# Patient Record
Sex: FEMALE | Race: WHITE | Employment: OTHER | URBAN - METROPOLITAN AREA
[De-identification: names, ages, dates, MRNs, and addresses within clinical notes are randomized per-mention and may not be internally consistent; named-entity substitution may affect disease eponyms.]

---

## 2017-01-30 PROBLEM — R07.9 CHEST PAIN: Status: ACTIVE | Noted: 2017-01-30

## 2017-01-30 PROBLEM — E11.9 DM2 (DIABETES MELLITUS, TYPE 2) (HCC): Status: ACTIVE | Noted: 2017-01-30

## 2017-01-30 PROBLEM — K21.9 GERD (GASTROESOPHAGEAL REFLUX DISEASE): Status: ACTIVE | Noted: 2017-01-30

## 2017-02-02 ENCOUNTER — TELEPHONE (OUTPATIENT)
Dept: CARDIOLOGY CLINIC | Age: 67
End: 2017-02-02

## 2017-02-10 ENCOUNTER — OFFICE VISIT (OUTPATIENT)
Dept: CARDIOLOGY CLINIC | Age: 67
End: 2017-02-10

## 2017-02-10 VITALS
BODY MASS INDEX: 31.39 KG/M2 | WEIGHT: 200 LBS | DIASTOLIC BLOOD PRESSURE: 70 MMHG | OXYGEN SATURATION: 97 % | HEIGHT: 67 IN | HEART RATE: 68 BPM | SYSTOLIC BLOOD PRESSURE: 120 MMHG

## 2017-02-10 DIAGNOSIS — I25.10 CORONARY ARTERY DISEASE INVOLVING NATIVE CORONARY ARTERY OF NATIVE HEART WITHOUT ANGINA PECTORIS: Primary | ICD-10-CM

## 2017-02-10 DIAGNOSIS — E78.2 MIXED HYPERLIPIDEMIA: ICD-10-CM

## 2017-02-10 DIAGNOSIS — I10 ESSENTIAL HYPERTENSION: ICD-10-CM

## 2017-02-10 PROBLEM — R07.9 CHEST PAIN: Status: RESOLVED | Noted: 2017-01-30 | Resolved: 2017-02-10

## 2017-02-10 PROBLEM — I25.119 CORONARY ARTERY DISEASE INVOLVING NATIVE CORONARY ARTERY OF NATIVE HEART WITH ANGINA PECTORIS (HCC): Status: ACTIVE | Noted: 2017-02-10

## 2017-02-10 PROCEDURE — 99214 OFFICE O/P EST MOD 30 MIN: CPT | Performed by: NURSE PRACTITIONER

## 2017-02-10 RX ORDER — GLIMEPIRIDE 4 MG/1
4 TABLET ORAL
COMMUNITY

## 2017-02-10 RX ORDER — CLOPIDOGREL BISULFATE 75 MG/1
75 TABLET ORAL DAILY
Qty: 30 TABLET | Refills: 11 | Status: SHIPPED | OUTPATIENT
Start: 2017-02-10 | End: 2018-03-03 | Stop reason: SDUPTHER

## 2017-02-27 ENCOUNTER — TELEPHONE (OUTPATIENT)
Dept: CARDIOLOGY CLINIC | Age: 67
End: 2017-02-27

## 2017-04-13 ENCOUNTER — OFFICE VISIT (OUTPATIENT)
Dept: CARDIOLOGY CLINIC | Age: 67
End: 2017-04-13

## 2017-04-13 VITALS
HEART RATE: 70 BPM | WEIGHT: 190 LBS | SYSTOLIC BLOOD PRESSURE: 132 MMHG | BODY MASS INDEX: 29.82 KG/M2 | HEIGHT: 67 IN | OXYGEN SATURATION: 95 % | DIASTOLIC BLOOD PRESSURE: 74 MMHG

## 2017-04-13 DIAGNOSIS — I10 ESSENTIAL HYPERTENSION: ICD-10-CM

## 2017-04-13 DIAGNOSIS — I25.10 CORONARY ARTERY DISEASE INVOLVING NATIVE CORONARY ARTERY OF NATIVE HEART WITHOUT ANGINA PECTORIS: Primary | ICD-10-CM

## 2017-04-13 DIAGNOSIS — E78.2 MIXED HYPERLIPIDEMIA: ICD-10-CM

## 2017-04-13 PROCEDURE — 99214 OFFICE O/P EST MOD 30 MIN: CPT | Performed by: NURSE PRACTITIONER

## 2017-04-21 ENCOUNTER — HOSPITAL ENCOUNTER (OUTPATIENT)
Dept: NON INVASIVE DIAGNOSTICS | Age: 67
Discharge: HOME | End: 2017-04-21
Attending: NURSE PRACTITIONER | Admitting: NURSE PRACTITIONER

## 2017-04-21 ENCOUNTER — TELEPHONE (OUTPATIENT)
Dept: CARDIOLOGY CLINIC | Age: 67
End: 2017-04-21

## 2017-06-05 RX ORDER — ATORVASTATIN CALCIUM 40 MG/1
40 TABLET, FILM COATED ORAL NIGHTLY
Qty: 30 TABLET | Refills: 0 | Status: SHIPPED | OUTPATIENT
Start: 2017-06-05 | End: 2017-06-05 | Stop reason: SDUPTHER

## 2017-06-05 RX ORDER — CARVEDILOL 3.12 MG/1
3.12 TABLET ORAL 2 TIMES DAILY WITH MEALS
Qty: 60 TABLET | Refills: 0 | Status: SHIPPED | OUTPATIENT
Start: 2017-06-05 | End: 2017-06-05 | Stop reason: SDUPTHER

## 2017-06-06 RX ORDER — CARVEDILOL 3.12 MG/1
3.12 TABLET ORAL 2 TIMES DAILY WITH MEALS
Qty: 60 TABLET | Refills: 3 | Status: SHIPPED | OUTPATIENT
Start: 2017-06-06 | End: 2017-07-11 | Stop reason: SDUPTHER

## 2017-06-06 RX ORDER — ATORVASTATIN CALCIUM 40 MG/1
40 TABLET, FILM COATED ORAL NIGHTLY
Qty: 30 TABLET | Refills: 3 | Status: SHIPPED | OUTPATIENT
Start: 2017-06-06 | End: 2017-11-11 | Stop reason: SDUPTHER

## 2017-07-11 ENCOUNTER — OFFICE VISIT (OUTPATIENT)
Dept: CARDIOLOGY CLINIC | Age: 67
End: 2017-07-11

## 2017-07-11 VITALS
HEART RATE: 64 BPM | HEIGHT: 67 IN | OXYGEN SATURATION: 96 % | BODY MASS INDEX: 29.51 KG/M2 | WEIGHT: 188 LBS | DIASTOLIC BLOOD PRESSURE: 80 MMHG | SYSTOLIC BLOOD PRESSURE: 152 MMHG

## 2017-07-11 DIAGNOSIS — E78.2 MIXED HYPERLIPIDEMIA: ICD-10-CM

## 2017-07-11 DIAGNOSIS — I25.10 CORONARY ARTERY DISEASE INVOLVING NATIVE CORONARY ARTERY OF NATIVE HEART WITHOUT ANGINA PECTORIS: Primary | ICD-10-CM

## 2017-07-11 DIAGNOSIS — I10 ESSENTIAL HYPERTENSION: ICD-10-CM

## 2017-07-11 PROCEDURE — 99214 OFFICE O/P EST MOD 30 MIN: CPT | Performed by: NURSE PRACTITIONER

## 2017-07-11 RX ORDER — CARVEDILOL 6.25 MG/1
6.25 TABLET ORAL 2 TIMES DAILY WITH MEALS
Qty: 60 TABLET | Refills: 5 | Status: SHIPPED | OUTPATIENT
Start: 2017-07-11 | End: 2018-02-05 | Stop reason: SDUPTHER

## 2017-09-01 ENCOUNTER — TELEPHONE (OUTPATIENT)
Dept: CARDIOLOGY CLINIC | Age: 67
End: 2017-09-01

## 2017-11-14 RX ORDER — ATORVASTATIN CALCIUM 40 MG/1
TABLET, FILM COATED ORAL
Qty: 30 TABLET | Refills: 2 | Status: SHIPPED | OUTPATIENT
Start: 2017-11-14 | End: 2018-04-09 | Stop reason: SDUPTHER

## 2018-01-31 ENCOUNTER — OFFICE VISIT (OUTPATIENT)
Dept: CARDIOLOGY CLINIC | Age: 68
End: 2018-01-31

## 2018-01-31 VITALS
WEIGHT: 195 LBS | HEART RATE: 70 BPM | DIASTOLIC BLOOD PRESSURE: 74 MMHG | BODY MASS INDEX: 30.54 KG/M2 | SYSTOLIC BLOOD PRESSURE: 122 MMHG | OXYGEN SATURATION: 93 %

## 2018-01-31 DIAGNOSIS — E78.2 MIXED HYPERLIPIDEMIA: ICD-10-CM

## 2018-01-31 DIAGNOSIS — I10 ESSENTIAL HYPERTENSION: Primary | ICD-10-CM

## 2018-01-31 DIAGNOSIS — I25.119 CORONARY ARTERY DISEASE INVOLVING NATIVE CORONARY ARTERY OF NATIVE HEART WITH ANGINA PECTORIS (HCC): ICD-10-CM

## 2018-01-31 PROCEDURE — 99214 OFFICE O/P EST MOD 30 MIN: CPT | Performed by: NURSE PRACTITIONER

## 2018-01-31 NOTE — PROGRESS NOTES
improve) Space out to every 6 hours as symptoms improve. 1 Inhaler 0    losartan (COZAAR) 100 MG tablet Take 1 tablet by mouth daily 30 tablet 3    metformin (GLUCOPHAGE) 500 MG tablet Take 1 tablet by mouth 2 times daily (with meals). 60 tablet 0     No current facility-administered medications for this visit. REVIEW OF SYSTEMS:   CONSTITUTIONAL: No major weight gain or loss, fatigue, weakness, night sweats or fever. There's been no change in energy level, sleep pattern, or activity level. HEENT: No new vision difficulties or ringing in the ears. RESPIRATORY: No new SOB, PND, orthopnea or cough. CARDIOVASCULAR: See HPI  GI: No nausea, vomiting, diarrhea, constipation, abdominal pain or changes in bowel habits. : No urinary frequency, urgency, incontinence hematuria or dysuria. SKIN: No cyanosis or skin lesions. MUSCULOSKELETAL: No new muscle or joint pain. NEUROLOGICAL: No syncope or TIA-like symptoms. PSYCHIATRIC: No anxiety, pain, insomnia or depression    Objective:   PHYSICAL EXAM:        VITALS:  /74   Pulse 70   Wt 195 lb (88.5 kg)   SpO2 93%   Breastfeeding? No   BMI 30.54 kg/m²     CONSTITUTIONAL: Cooperative, no apparent distress, and appears well nourished / developed  NEUROLOGIC:  Awake and orientated to person, place and time. PSYCH: Calm affect. SKIN: Warm and dry. HEENT: Sclera non-icteric, normocephalic, neck supple, no elevation of JVP, normal carotid pulses with no bruits and thyroid normal size. LUNGS:  No increased work of breathing and clear to auscultation, no crackles or wheezing. CARDIOVASCULAR:  Regular rate and rhythm with no murmurs, gallops, rubs, or abnormal heart sounds, normal PMI. The apical impulses not displaced.                              Heart tones are crisp and normal                                                                Cervical veins are not engorged                 JVP less than 8 cm H2O

## 2018-03-07 RX ORDER — CLOPIDOGREL BISULFATE 75 MG/1
TABLET ORAL
Qty: 30 TABLET | Refills: 10 | Status: SHIPPED | OUTPATIENT
Start: 2018-03-07 | End: 2021-03-28

## 2018-05-11 ENCOUNTER — HOSPITAL ENCOUNTER (OUTPATIENT)
Dept: OTHER | Age: 68
Discharge: HOME | End: 2018-05-11
Attending: FAMILY MEDICINE | Admitting: FAMILY MEDICINE

## 2018-05-11 DIAGNOSIS — R52 PAIN: ICD-10-CM

## 2018-12-29 ENCOUNTER — APPOINTMENT (OUTPATIENT)
Dept: GENERAL RADIOLOGY | Age: 68
End: 2018-12-29
Payer: COMMERCIAL

## 2018-12-29 ENCOUNTER — HOSPITAL ENCOUNTER (EMERGENCY)
Age: 68
Discharge: HOME | End: 2018-12-29
Attending: EMERGENCY MEDICINE
Payer: COMMERCIAL

## 2018-12-29 VITALS
OXYGEN SATURATION: 97 % | RESPIRATION RATE: 20 BRPM | HEIGHT: 66 IN | HEART RATE: 73 BPM | DIASTOLIC BLOOD PRESSURE: 59 MMHG | WEIGHT: 184 LBS | SYSTOLIC BLOOD PRESSURE: 130 MMHG | TEMPERATURE: 98.1 F | BODY MASS INDEX: 29.57 KG/M2

## 2018-12-29 DIAGNOSIS — J06.9 VIRAL URI WITH COUGH: Primary | ICD-10-CM

## 2018-12-29 DIAGNOSIS — M94.0 COSTOCHONDRITIS: ICD-10-CM

## 2018-12-29 LAB
EKG ATRIAL RATE: 76 BPM
EKG DIAGNOSIS: NORMAL
EKG P AXIS: 59 DEGREES
EKG P-R INTERVAL: 164 MS
EKG Q-T INTERVAL: 370 MS
EKG QRS DURATION: 80 MS
EKG QTC CALCULATION (BAZETT): 416 MS
EKG R AXIS: 55 DEGREES
EKG T AXIS: 61 DEGREES
EKG VENTRICULAR RATE: 76 BPM

## 2018-12-29 PROCEDURE — 71046 X-RAY EXAM CHEST 2 VIEWS: CPT

## 2018-12-29 PROCEDURE — 99283 EMERGENCY DEPT VISIT LOW MDM: CPT

## 2018-12-29 PROCEDURE — 94640 AIRWAY INHALATION TREATMENT: CPT

## 2018-12-29 PROCEDURE — 6370000000 HC RX 637 (ALT 250 FOR IP): Performed by: EMERGENCY MEDICINE

## 2018-12-29 PROCEDURE — 93010 ELECTROCARDIOGRAM REPORT: CPT | Performed by: INTERNAL MEDICINE

## 2018-12-29 PROCEDURE — 93005 ELECTROCARDIOGRAM TRACING: CPT | Performed by: EMERGENCY MEDICINE

## 2018-12-29 PROCEDURE — 94664 DEMO&/EVAL PT USE INHALER: CPT

## 2018-12-29 PROCEDURE — 94760 N-INVAS EAR/PLS OXIMETRY 1: CPT

## 2018-12-29 RX ORDER — ALBUTEROL SULFATE 90 UG/1
2 AEROSOL, METERED RESPIRATORY (INHALATION) 4 TIMES DAILY PRN
Qty: 3 INHALER | Refills: 1 | Status: SHIPPED | OUTPATIENT
Start: 2018-12-29 | End: 2021-03-28

## 2018-12-29 RX ORDER — IPRATROPIUM BROMIDE AND ALBUTEROL SULFATE 2.5; .5 MG/3ML; MG/3ML
1 SOLUTION RESPIRATORY (INHALATION) ONCE
Status: COMPLETED | OUTPATIENT
Start: 2018-12-29 | End: 2018-12-29

## 2018-12-29 RX ORDER — BENZONATATE 100 MG/1
100 CAPSULE ORAL ONCE
Status: COMPLETED | OUTPATIENT
Start: 2018-12-29 | End: 2018-12-29

## 2018-12-29 RX ORDER — IBUPROFEN 600 MG/1
600 TABLET ORAL ONCE
Status: COMPLETED | OUTPATIENT
Start: 2018-12-29 | End: 2018-12-29

## 2018-12-29 RX ORDER — BENZONATATE 100 MG/1
100 CAPSULE ORAL 3 TIMES DAILY PRN
Qty: 12 CAPSULE | Refills: 0 | Status: SHIPPED | OUTPATIENT
Start: 2018-12-29 | End: 2021-06-09 | Stop reason: ALTCHOICE

## 2018-12-29 RX ORDER — IBUPROFEN 200 MG
600 TABLET ORAL EVERY 8 HOURS PRN
Qty: 60 TABLET | Refills: 0 | Status: SHIPPED | OUTPATIENT
Start: 2018-12-29 | End: 2021-03-28

## 2018-12-29 RX ADMIN — IBUPROFEN 600 MG: 600 TABLET ORAL at 05:00

## 2018-12-29 RX ADMIN — BENZONATATE 100 MG: 100 CAPSULE ORAL at 05:00

## 2018-12-29 RX ADMIN — IPRATROPIUM BROMIDE AND ALBUTEROL SULFATE 1 AMPULE: .5; 3 SOLUTION RESPIRATORY (INHALATION) at 05:12

## 2018-12-29 ASSESSMENT — ENCOUNTER SYMPTOMS
NAUSEA: 0
ABDOMINAL PAIN: 0
VOMITING: 0
BACK PAIN: 0
COUGH: 1
SHORTNESS OF BREATH: 1

## 2018-12-29 ASSESSMENT — PAIN SCALES - GENERAL: PAINLEVEL_OUTOF10: 5

## 2018-12-30 ENCOUNTER — HOSPITAL ENCOUNTER (EMERGENCY)
Age: 68
Discharge: HOME | End: 2018-12-31
Attending: EMERGENCY MEDICINE
Payer: COMMERCIAL

## 2018-12-30 ENCOUNTER — APPOINTMENT (OUTPATIENT)
Dept: CT IMAGING | Age: 68
End: 2018-12-30
Payer: COMMERCIAL

## 2018-12-30 DIAGNOSIS — E27.8 ADRENAL NODULE (HCC): ICD-10-CM

## 2018-12-30 DIAGNOSIS — E87.1 HYPONATREMIA: ICD-10-CM

## 2018-12-30 DIAGNOSIS — R10.84 GENERALIZED ABDOMINAL PAIN: Primary | ICD-10-CM

## 2018-12-30 DIAGNOSIS — B34.9 VIRAL ILLNESS: ICD-10-CM

## 2018-12-30 LAB
A/G RATIO: 1.2 (ref 1.1–2.2)
ALBUMIN SERPL-MCNC: 3.8 G/DL (ref 3.4–5)
ALP BLD-CCNC: 104 U/L (ref 40–129)
ALT SERPL-CCNC: 18 U/L (ref 10–40)
ANION GAP SERPL CALCULATED.3IONS-SCNC: 9 MMOL/L (ref 3–16)
AST SERPL-CCNC: 20 U/L (ref 15–37)
BASOPHILS ABSOLUTE: 0 K/UL (ref 0–0.2)
BASOPHILS RELATIVE PERCENT: 0.3 %
BILIRUB SERPL-MCNC: 0.4 MG/DL (ref 0–1)
BUN BLDV-MCNC: 10 MG/DL (ref 7–20)
CALCIUM SERPL-MCNC: 8.8 MG/DL (ref 8.3–10.6)
CHLORIDE BLD-SCNC: 96 MMOL/L (ref 99–110)
CO2: 22 MMOL/L (ref 21–32)
CREAT SERPL-MCNC: 0.5 MG/DL (ref 0.6–1.2)
EOSINOPHILS ABSOLUTE: 0 K/UL (ref 0–0.6)
EOSINOPHILS RELATIVE PERCENT: 0.1 %
GFR AFRICAN AMERICAN: >60
GFR NON-AFRICAN AMERICAN: >60
GLOBULIN: 3.2 G/DL
GLUCOSE BLD-MCNC: 247 MG/DL (ref 70–99)
HCT VFR BLD CALC: 42.1 % (ref 36–48)
HEMOGLOBIN: 14.2 G/DL (ref 12–16)
LIPASE: 32 U/L (ref 13–60)
LYMPHOCYTES ABSOLUTE: 0.7 K/UL (ref 1–5.1)
LYMPHOCYTES RELATIVE PERCENT: 13.2 %
MAGNESIUM: 1.8 MG/DL (ref 1.8–2.4)
MCH RBC QN AUTO: 31.3 PG (ref 26–34)
MCHC RBC AUTO-ENTMCNC: 33.8 G/DL (ref 31–36)
MCV RBC AUTO: 92.6 FL (ref 80–100)
MONOCYTES ABSOLUTE: 0.5 K/UL (ref 0–1.3)
MONOCYTES RELATIVE PERCENT: 9.5 %
NEUTROPHILS ABSOLUTE: 4.2 K/UL (ref 1.7–7.7)
NEUTROPHILS RELATIVE PERCENT: 76.9 %
PDW BLD-RTO: 13.8 % (ref 12.4–15.4)
PLATELET # BLD: 149 K/UL (ref 135–450)
PMV BLD AUTO: 7.7 FL (ref 5–10.5)
POTASSIUM SERPL-SCNC: 4.3 MMOL/L (ref 3.5–5.1)
RBC # BLD: 4.54 M/UL (ref 4–5.2)
SODIUM BLD-SCNC: 127 MMOL/L (ref 136–145)
TOTAL PROTEIN: 7 G/DL (ref 6.4–8.2)
TROPONIN: <0.01 NG/ML
WBC # BLD: 5.5 K/UL (ref 4–11)

## 2018-12-30 PROCEDURE — 2580000003 HC RX 258: Performed by: EMERGENCY MEDICINE

## 2018-12-30 PROCEDURE — 99284 EMERGENCY DEPT VISIT MOD MDM: CPT

## 2018-12-30 PROCEDURE — 74177 CT ABD & PELVIS W/CONTRAST: CPT

## 2018-12-30 PROCEDURE — 96374 THER/PROPH/DIAG INJ IV PUSH: CPT

## 2018-12-30 PROCEDURE — 93005 ELECTROCARDIOGRAM TRACING: CPT | Performed by: EMERGENCY MEDICINE

## 2018-12-30 PROCEDURE — 6370000000 HC RX 637 (ALT 250 FOR IP): Performed by: EMERGENCY MEDICINE

## 2018-12-30 PROCEDURE — S0028 INJECTION, FAMOTIDINE, 20 MG: HCPCS | Performed by: EMERGENCY MEDICINE

## 2018-12-30 PROCEDURE — 6360000002 HC RX W HCPCS: Performed by: EMERGENCY MEDICINE

## 2018-12-30 PROCEDURE — 83735 ASSAY OF MAGNESIUM: CPT

## 2018-12-30 PROCEDURE — 2500000003 HC RX 250 WO HCPCS: Performed by: EMERGENCY MEDICINE

## 2018-12-30 PROCEDURE — 80053 COMPREHEN METABOLIC PANEL: CPT

## 2018-12-30 PROCEDURE — 85025 COMPLETE CBC W/AUTO DIFF WBC: CPT

## 2018-12-30 PROCEDURE — 6360000004 HC RX CONTRAST MEDICATION: Performed by: EMERGENCY MEDICINE

## 2018-12-30 PROCEDURE — 83690 ASSAY OF LIPASE: CPT

## 2018-12-30 PROCEDURE — 96375 TX/PRO/DX INJ NEW DRUG ADDON: CPT

## 2018-12-30 PROCEDURE — 84484 ASSAY OF TROPONIN QUANT: CPT

## 2018-12-30 RX ORDER — ONDANSETRON 2 MG/ML
4 INJECTION INTRAMUSCULAR; INTRAVENOUS ONCE
Status: COMPLETED | OUTPATIENT
Start: 2018-12-30 | End: 2018-12-30

## 2018-12-30 RX ORDER — ACETAMINOPHEN 500 MG
1000 TABLET ORAL ONCE
Status: COMPLETED | OUTPATIENT
Start: 2018-12-30 | End: 2018-12-30

## 2018-12-30 RX ORDER — 0.9 % SODIUM CHLORIDE 0.9 %
1000 INTRAVENOUS SOLUTION INTRAVENOUS ONCE
Status: COMPLETED | OUTPATIENT
Start: 2018-12-30 | End: 2018-12-31

## 2018-12-30 RX ADMIN — IOPAMIDOL 75 ML: 755 INJECTION, SOLUTION INTRAVENOUS at 23:50

## 2018-12-30 RX ADMIN — SODIUM CHLORIDE 1000 ML: 9 INJECTION, SOLUTION INTRAVENOUS at 23:28

## 2018-12-30 RX ADMIN — ACETAMINOPHEN 1000 MG: 500 TABLET, FILM COATED ORAL at 23:29

## 2018-12-30 RX ADMIN — ONDANSETRON 4 MG: 2 INJECTION INTRAMUSCULAR; INTRAVENOUS at 23:30

## 2018-12-30 RX ADMIN — FAMOTIDINE 20 MG: 10 INJECTION, SOLUTION INTRAVENOUS at 23:30

## 2018-12-30 ASSESSMENT — PAIN SCALES - GENERAL
PAINLEVEL_OUTOF10: 2
PAINLEVEL_OUTOF10: 0

## 2018-12-30 ASSESSMENT — PAIN DESCRIPTION - LOCATION: LOCATION: ABDOMEN

## 2018-12-30 ASSESSMENT — PAIN DESCRIPTION - PAIN TYPE: TYPE: ACUTE PAIN

## 2018-12-31 VITALS
TEMPERATURE: 98.5 F | OXYGEN SATURATION: 95 % | BODY MASS INDEX: 29.57 KG/M2 | HEART RATE: 78 BPM | HEIGHT: 66 IN | RESPIRATION RATE: 20 BRPM | DIASTOLIC BLOOD PRESSURE: 66 MMHG | WEIGHT: 184 LBS | SYSTOLIC BLOOD PRESSURE: 135 MMHG

## 2018-12-31 LAB
ANION GAP SERPL CALCULATED.3IONS-SCNC: 8 MMOL/L (ref 3–16)
BILIRUBIN URINE: NEGATIVE
BLOOD, URINE: NEGATIVE
BUN BLDV-MCNC: 11 MG/DL (ref 7–20)
CALCIUM SERPL-MCNC: 8.6 MG/DL (ref 8.3–10.6)
CHLORIDE BLD-SCNC: 105 MMOL/L (ref 99–110)
CLARITY: CLEAR
CO2: 26 MMOL/L (ref 21–32)
COLOR: YELLOW
CREAT SERPL-MCNC: <0.5 MG/DL (ref 0.6–1.2)
GFR AFRICAN AMERICAN: >60
GFR NON-AFRICAN AMERICAN: >60
GLUCOSE BLD-MCNC: 142 MG/DL (ref 70–99)
GLUCOSE URINE: NEGATIVE MG/DL
KETONES, URINE: NEGATIVE MG/DL
LEUKOCYTE ESTERASE, URINE: NEGATIVE
MICROSCOPIC EXAMINATION: NORMAL
NITRITE, URINE: NEGATIVE
PH UA: 5.5
POTASSIUM REFLEX MAGNESIUM: 4.1 MMOL/L (ref 3.5–5.1)
PROTEIN UA: NEGATIVE MG/DL
SODIUM BLD-SCNC: 139 MMOL/L (ref 136–145)
SPECIFIC GRAVITY UA: 1.02
URINE REFLEX TO CULTURE: NORMAL
URINE TYPE: NORMAL
UROBILINOGEN, URINE: 1 E.U./DL

## 2018-12-31 PROCEDURE — 81003 URINALYSIS AUTO W/O SCOPE: CPT

## 2018-12-31 PROCEDURE — 80048 BASIC METABOLIC PNL TOTAL CA: CPT

## 2018-12-31 RX ORDER — RANITIDINE 150 MG/1
150 TABLET ORAL 2 TIMES DAILY
Qty: 60 TABLET | Refills: 0 | Status: SHIPPED | OUTPATIENT
Start: 2018-12-31 | End: 2021-03-28

## 2018-12-31 RX ORDER — ONDANSETRON 4 MG/1
4-8 TABLET, ORALLY DISINTEGRATING ORAL EVERY 12 HOURS PRN
Qty: 12 TABLET | Refills: 0 | Status: SHIPPED | OUTPATIENT
Start: 2018-12-31 | End: 2021-03-28

## 2018-12-31 RX ORDER — ACETAMINOPHEN 500 MG
500 TABLET ORAL 4 TIMES DAILY PRN
Qty: 60 TABLET | Refills: 0 | Status: SHIPPED | OUTPATIENT
Start: 2018-12-31

## 2019-01-01 LAB
EKG ATRIAL RATE: 71 BPM
EKG DIAGNOSIS: NORMAL
EKG P AXIS: 48 DEGREES
EKG P-R INTERVAL: 158 MS
EKG Q-T INTERVAL: 378 MS
EKG QRS DURATION: 82 MS
EKG QTC CALCULATION (BAZETT): 410 MS
EKG R AXIS: 47 DEGREES
EKG T AXIS: 21 DEGREES
EKG VENTRICULAR RATE: 71 BPM

## 2019-01-01 PROCEDURE — 93010 ELECTROCARDIOGRAM REPORT: CPT | Performed by: INTERNAL MEDICINE

## 2019-08-12 ENCOUNTER — APPOINTMENT (OUTPATIENT)
Dept: GENERAL RADIOLOGY | Age: 69
End: 2019-08-12
Payer: COMMERCIAL

## 2019-08-12 ENCOUNTER — HOSPITAL ENCOUNTER (EMERGENCY)
Age: 69
Discharge: HOME | End: 2019-08-12
Payer: COMMERCIAL

## 2019-08-12 VITALS
WEIGHT: 190 LBS | BODY MASS INDEX: 30.53 KG/M2 | DIASTOLIC BLOOD PRESSURE: 80 MMHG | RESPIRATION RATE: 18 BRPM | HEART RATE: 69 BPM | OXYGEN SATURATION: 95 % | SYSTOLIC BLOOD PRESSURE: 161 MMHG | TEMPERATURE: 97.9 F | HEIGHT: 66 IN

## 2019-08-12 DIAGNOSIS — M25.531 RIGHT WRIST PAIN: Primary | ICD-10-CM

## 2019-08-12 PROCEDURE — 99283 EMERGENCY DEPT VISIT LOW MDM: CPT

## 2019-08-12 PROCEDURE — 73110 X-RAY EXAM OF WRIST: CPT

## 2019-08-12 ASSESSMENT — PAIN SCALES - GENERAL: PAINLEVEL_OUTOF10: 8

## 2019-08-12 ASSESSMENT — ENCOUNTER SYMPTOMS: COLOR CHANGE: 0

## 2019-08-12 ASSESSMENT — PAIN DESCRIPTION - ORIENTATION: ORIENTATION: RIGHT

## 2019-08-12 ASSESSMENT — PAIN DESCRIPTION - LOCATION: LOCATION: WRIST

## 2019-08-12 ASSESSMENT — PAIN DESCRIPTION - PAIN TYPE: TYPE: ACUTE PAIN

## 2020-11-03 PROBLEM — E11.9 DM2 (DIABETES MELLITUS, TYPE 2) (HCC): Status: RESOLVED | Noted: 2017-01-30 | Resolved: 2020-11-03

## 2020-12-21 ENCOUNTER — TELEPHONE (OUTPATIENT)
Dept: CARDIOLOGY CLINIC | Age: 70
End: 2020-12-21

## 2020-12-21 NOTE — TELEPHONE ENCOUNTER
Spoke to patient. PCI report from 1/30/2017 containing stent information was faxed successfully to Dr. Ramírez Nuñez office, [facility], fax # [phone].

## 2021-03-28 ENCOUNTER — HOSPITAL ENCOUNTER (EMERGENCY)
Age: 71
Discharge: HOME | End: 2021-03-28
Payer: COMMERCIAL

## 2021-03-28 ENCOUNTER — APPOINTMENT (OUTPATIENT)
Dept: GENERAL RADIOLOGY | Age: 71
End: 2021-03-28
Payer: COMMERCIAL

## 2021-03-28 VITALS
WEIGHT: 185 LBS | SYSTOLIC BLOOD PRESSURE: 161 MMHG | HEIGHT: 66 IN | OXYGEN SATURATION: 98 % | HEART RATE: 77 BPM | DIASTOLIC BLOOD PRESSURE: 82 MMHG | RESPIRATION RATE: 16 BRPM | TEMPERATURE: 98.2 F | BODY MASS INDEX: 29.73 KG/M2

## 2021-03-28 DIAGNOSIS — M25.571 RIGHT ANKLE PAIN, UNSPECIFIED CHRONICITY: Primary | ICD-10-CM

## 2021-03-28 PROCEDURE — 6370000000 HC RX 637 (ALT 250 FOR IP): Performed by: PHYSICIAN ASSISTANT

## 2021-03-28 PROCEDURE — 73610 X-RAY EXAM OF ANKLE: CPT

## 2021-03-28 PROCEDURE — 73630 X-RAY EXAM OF FOOT: CPT

## 2021-03-28 PROCEDURE — 99283 EMERGENCY DEPT VISIT LOW MDM: CPT

## 2021-03-28 RX ORDER — IBUPROFEN 600 MG/1
600 TABLET ORAL ONCE
Status: COMPLETED | OUTPATIENT
Start: 2021-03-28 | End: 2021-03-28

## 2021-03-28 RX ADMIN — IBUPROFEN 600 MG: 600 TABLET ORAL at 08:44

## 2021-03-28 ASSESSMENT — ENCOUNTER SYMPTOMS
VOMITING: 0
NAUSEA: 0

## 2021-03-28 ASSESSMENT — PAIN SCALES - GENERAL
PAINLEVEL_OUTOF10: 0
PAINLEVEL_OUTOF10: 0

## 2021-03-28 NOTE — ED PROVIDER NOTES
(2-9 systems for level 4, 10 or more for level 5)     Review of Systems   Constitutional: Negative for activity change, appetite change and fever. Gastrointestinal: Negative for nausea and vomiting. Musculoskeletal: Positive for arthralgias. Skin: Negative for wound. Neurological: Negative for weakness and numbness. Positives and Pertinent negatives as per HPI. Except as noted above in the ROS, all other systems were reviewed and negative. PAST MEDICAL HISTORY     Past Medical History:   Diagnosis Date    Arthritis     Asthma     Coronary artery disease involving native coronary artery of native heart with angina pectoris ([facility] [address] 2/10/2017    Diabetes mellitus ([facility] [address]     GERD (gastroesophageal reflux disease) 1/30/2017    Hypertension     Mitral valve prolapse     Mixed hyperlipidemia 1/31/2018    Rotator cuff tear 10/23/2013         SURGICAL HISTORY     Past Surgical History:   Procedure Laterality Date    CORONARY ANGIOPLASTY WITH STENT PLACEMENT      TONSILLECTOMY           CURRENTMEDICATIONS       Discharge Medication List as of 3/28/2021  9:28 AM      CONTINUE these medications which have NOT CHANGED    Details   acetaminophen (TYLENOL) 500 MG tablet Take 1 tablet by mouth 4 times daily as needed for Pain, Disp-60 tablet, R-0Print      benzonatate (TESSALON PERLES) 100 MG capsule Take 1 capsule by mouth 3 times daily as needed for Cough, Disp-12 capsule, R-0Print      atorvastatin (LIPITOR) 40 MG tablet TAKE ONE TABLET BY MOUTH ONCE NIGHTLY, Disp-30 tablet, R-2Normal      glimepiride (AMARYL) 4 MG tablet Take 4 mg by mouth every morning (before breakfast)      naproxen sodium (ALEVE) 220 MG tablet Take 220 mg by mouth 2 times daily (with meals)      losartan (COZAAR) 100 MG tablet Take 1 tablet by mouth daily, Disp-30 tablet, R-3      metformin (GLUCOPHAGE) 500 MG tablet Take 1 tablet by mouth 2 times daily (with meals). , Disp-60 tablet, R-0               ALLERGIES     Bactrim [sulfamethoxazole-trimethoprim] and Lisinopril    FAMILYHISTORY       Family History   Problem Relation Age of Onset    Diabetes Mother     Kidney Disease Mother     Diabetes Father     Stroke Father     Heart Disease Father     Arthritis Other     Hypertension Other           SOCIAL HISTORY       Social History     Tobacco Use    Smoking status: Former Smoker     Quit date: 2002     Years since quittin.6    Smokeless tobacco: Never Used   Substance Use Topics    Alcohol use: No    Drug use: No       SCREENINGS             PHYSICAL EXAM    (up to 7 for level 4, 8 or more for level 5)     ED Triage Vitals [21 0823]   BP Temp Temp Source Pulse Resp SpO2 Height Weight   (!) 161/82 98.2 °F (36.8 °C) Oral 77 16 98 % 5' 6\" (1.676 m) 185 lb (83.9 kg)       Physical Exam  Vitals signs and nursing note reviewed. Constitutional:       Appearance: She is well-developed. She is not diaphoretic. HENT:      Head: Normocephalic and atraumatic. Right Ear: External ear normal.      Left Ear: External ear normal.      Nose: Nose normal.   Eyes:      General:         Right eye: No discharge. Left eye: No discharge. Neck:      Musculoskeletal: Normal range of motion and neck supple. Trachea: No tracheal deviation. Cardiovascular:      Pulses: Normal pulses. Pulmonary:      Effort: Pulmonary effort is normal. No respiratory distress. Musculoskeletal: Normal range of motion. Comments: There is tenderness to palpation to the medial and lateral malleolus on the right, greater over the lateral malleolus. There is no overlying erythema, edema, ecchymosis or warmth. She has some tenderness to the lateral aspect of the right foot. Dorsalis pedis and posterior tibialis pulse are 2+. Normal sensation light touch. Neurovascularly intact. Full range of motion of all joints. Normal gait   Skin:     General: Skin is warm and dry.    Neurological:      Mental Status: She is alert and oriented to person, place, and time. Psychiatric:         Behavior: Behavior normal.         DIAGNOSTIC RESULTS   LABS:    Labs Reviewed - No data to display    All other labs were within normal range or not returned as of this dictation. EKG: All EKG's are interpreted by the Emergency Department Physician in the absence of a cardiologist.  Please see their note for interpretation of EKG. RADIOLOGY:   Non-plain film images such as CT, Ultrasound and MRI are read by the radiologist. Plain radiographic images are visualized and preliminarily interpreted by the ED Provider with the below findings:        Interpretation per the Radiologist below, if available at the time of this note:    XR ANKLE RIGHT (MIN 3 VIEWS)   Final Result   No acute osseous injury of the right ankle or foot. 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 XR FOOT RIGHT (MIN 3 VIEWS)   Final Result   No acute osseous injury of the right ankle or foot. 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 No results found. PROCEDURES   Unless otherwise noted below, none     Procedures    CRITICAL CARE TIME   N/A    CONSULTS:  None      EMERGENCY DEPARTMENT COURSE and DIFFERENTIAL DIAGNOSIS/MDM:   Vitals:    Vitals:    03/28/21 0823   BP: (!) 161/82   Pulse: 77   Resp: 16   Temp: 98.2 °F (36.8 °C)   TempSrc: Oral   SpO2: 98%   Weight: 185 lb (83.9 kg)   Height: 5' 6\" (1.676 m)       Patient was given the following medications:  Medications   ibuprofen (ADVIL;MOTRIN) tablet 600 mg (600 mg Oral Given 3/28/21 0844)           Recently, this is a 66-year-old female who presents to the emergency department today for evaluation for right ankle pain, x1 week. Patient believes that she may have twisted her ankle while she was at work, and adds that a cart ran into her ankle 2 weeks ago.     On physical exam she has tenderness to palpation to the right medial and lateral malleolus, increasing over the lateral malleolus, some tenderness over the lateral aspect of the foot, she is neurovascularly intact. X-rays obtained showed no acute process. I feel that the patient symptoms today are likely due to sprain/strain, possibly a contusion. The patient will be given an Aircast in the ED, and supportive care discussed at home. The patient states that she already sees [facility] for her back, I recommended that she follow-up within 1 week if there is no improvement. She was given a work note per her request.  She is to return to the ED for any new or worsening symptoms, the patient voiced understanding and is agreeable with plan. Stable for discharge. My suspicion is low at this time for occult fracture, dislocation, subluxation, arterial occlusion, septic arthritis or other emergent etiology    FINAL IMPRESSION      1. Right ankle pain, unspecified chronicity          DISPOSITION/PLAN   DISPOSITION Decision To Discharge 03/28/2021 09:13:54 AM      PATIENT REFERREDTO:  [facility]     Schedule an appointment as soon as possible for a visit in 1 week      [facility] Emergency Department  [address] [place] [id number]    As needed, If symptoms worsen      DISCHARGE MEDICATIONS:  Discharge Medication List as of 3/28/2021  9:28 AM          DISCONTINUED MEDICATIONS:  Discharge Medication List as of 3/28/2021  9:28 AM      STOP taking these medications       ondansetron (ZOFRAN ODT) 4 MG disintegrating tablet Comments:   Reason for Stopping:         ranitidine (ZANTAC) 150 MG tablet Comments:   Reason for Stopping:         Dextromethorphan-Guaifenesin (CORICIDIN HBP CONGESTION/COUGH PO) Comments:   Reason for Stopping:         albuterol sulfate  (90 Base) MCG/ACT inhaler Comments:   Reason for Stopping:         ibuprofen (ADVIL) 200 MG tablet Comments:   Reason for Stopping:         clopidogrel (PLAVIX) 75 MG tablet Comments: Reason for Stopping:         carvedilol (COREG) 6.25 MG tablet Comments:   Reason for Stopping:         albuterol sulfate HFA (PROVENTIL HFA) 108 (90 BASE) MCG/ACT inhaler Comments:   Reason for Stopping:                      (Please note that portions of this note were completed with a voice recognition program.  Efforts were made to edit the dictations but occasionally words are mis-transcribed.)    Krystyna Mane PA-C (electronically signed)            Krystyna Mane PA-C  03/28/21 8934

## 2021-03-28 NOTE — ED NOTES
Good PMS noted, no swelling or deformity. Skin warm no discoloration noted.       Tommy Gallardo RN  03/28/21 2270

## 2021-06-07 ENCOUNTER — TELEPHONE (OUTPATIENT)
Dept: CARDIOLOGY CLINIC | Age: 71
End: 2021-06-07

## 2021-06-07 NOTE — TELEPHONE ENCOUNTER
Pt calling she broke her Left Wrist and needs surgery on 06/11/20 at [address] with Dr Lisa Sarabia. Pt had stents placed 4-5 yrs ago and hasn't seen Colin Campos since 2017. Need to know where we can swapna pt?  Pls call to advise Thank you

## 2021-06-07 NOTE — TELEPHONE ENCOUNTER
Patient was last seen by Shannan Castellanos NP on 1/31/18 and by Dr. Josue Zaldivar on 11/29/17. Per Dr. Josue Zaldivar - patient will need to be cleared for surgery by her PCP. Dr. Josue Zaldivar has no openings in his schedule for preoperative risk assessment before scheduled surgery on 6/11/21. Left message on patient's voice mail with above information. Instructed patient to call our office back with any additional questions.

## 2021-06-07 NOTE — TELEPHONE ENCOUNTER
Spoke with patient. She understands that there are no openings this week with Dr. Lita Antunez and her last appt was over 3 years ago and cannot be scheduled with NP. Patient states that  at [facility] was insisted that she get clearance from both PCP and cardiology. She will be seeing Dr. Abhay Clark tomorrow and will ask him to if he can help with his clearance issue. Reviewed past cardiology appointments. She was last seen by [facility] om 1/2018. Office note said to return in six months, although patient does not recall being told she needed cardiology follow up. There was no six month appt scheduled and does not appear she was placed on recall list.   Discussed getting a future appt to re-establish herself with our office. Patient is agreeable to future appt.

## 2021-06-07 NOTE — TELEPHONE ENCOUNTER
LOV : 11/29/2021 w/ ADELA     Pt needs clearance prior to having surgery .  Please advise on where pt can be added

## 2021-06-08 ENCOUNTER — TELEPHONE (OUTPATIENT)
Dept: CARDIOLOGY CLINIC | Age: 71
End: 2021-06-08

## 2021-06-08 NOTE — TELEPHONE ENCOUNTER
Made appt for patient tomorrow w [facility] at [place] at 2:15 . Lmom for her to call to confirm and get time and address. Katie at Dr Ric Ramirez office notified .

## 2021-06-08 NOTE — TELEPHONE ENCOUNTER
Carey Espinal from Dr Gin Bolton office calling asking that this patient be seen for cardiac clearance for wrist surgery on Friday . She was told by this office yesterday that she could not be seen this week and to get clearance from her pcp . Orthopedic doctor said due to stents this patient needs to be cleared by a cardiologist and her broken wrist really cant be put off . Can she be worked in this week by [facility] doctor so that she can get her broken wrist surgery?  Please call Katie Garcia surgery scheduler to let her know when this patient can be seen

## 2021-06-09 ENCOUNTER — OFFICE VISIT (OUTPATIENT)
Dept: CARDIOLOGY CLINIC | Age: 71
End: 2021-06-09
Payer: COMMERCIAL

## 2021-06-09 VITALS
HEART RATE: 111 BPM | WEIGHT: 199.4 LBS | DIASTOLIC BLOOD PRESSURE: 100 MMHG | OXYGEN SATURATION: 97 % | SYSTOLIC BLOOD PRESSURE: 160 MMHG | BODY MASS INDEX: 33.22 KG/M2 | HEIGHT: 65 IN

## 2021-06-09 DIAGNOSIS — Z01.818 PRE-OP EXAM: Primary | ICD-10-CM

## 2021-06-09 DIAGNOSIS — E78.2 MIXED HYPERLIPIDEMIA: ICD-10-CM

## 2021-06-09 DIAGNOSIS — I25.119 CORONARY ARTERY DISEASE INVOLVING NATIVE CORONARY ARTERY OF NATIVE HEART WITH ANGINA PECTORIS (HCC): ICD-10-CM

## 2021-06-09 DIAGNOSIS — I10 ESSENTIAL HYPERTENSION: ICD-10-CM

## 2021-06-09 PROCEDURE — 99203 OFFICE O/P NEW LOW 30 MIN: CPT | Performed by: INTERNAL MEDICINE

## 2021-06-09 PROCEDURE — 93000 ELECTROCARDIOGRAM COMPLETE: CPT | Performed by: INTERNAL MEDICINE

## 2021-06-09 NOTE — PROGRESS NOTES
[facility]   Cardiac Consultation    Referring Provider:  Eddie You MD     Chief Complaint   Patient presents with    Cardiac Clearance     left wrist surgery        History of Present Illness:  Caridad Nguyen is a 70 y.o. female with a history of CAD/ HTN/ and hyperlipidemia. S/P 1/17: S/p PCI of Diag. Prior to her stent placement she presented to the ED with chest pain. Since that time she denies any complaints of similar symptoms. She is planning  left wrist surgery Friday at [facility] with Dr. Delano Rodriguez. She reports she has done well since the time of her stent placement. 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 She reports she recently returned from [employer] [facility], she had no problems walking there. Denies exertional chest pain, LOWE/PND, palpitations, light-headedness, edema. Past Medical History:   has a past medical history of Arthritis, Asthma, Coronary artery disease involving native coronary artery of native heart with angina pectoris ([address] Diabetes mellitus ([address] GERD (gastroesophageal reflux disease), Hypertension, Mitral valve prolapse, Mixed hyperlipidemia, and Rotator cuff tear. Surgical History:   has a past surgical history that includes Tonsillectomy and Coronary angioplasty with stent. Social History:   reports that she quit smoking about 18 years ago. She has never used smokeless tobacco. She reports that she does not drink alcohol and does not use drugs. Family History:  family history includes Arthritis in an other family member; Diabetes in her father and mother; Heart Disease in her father; Hypertension in an other family member; Kidney Disease in her mother; Stroke in her father. Home Medications:  Prior to Admission medications    Medication Sig Start Date End Date Taking?  Authorizing Provider   acetaminophen (TYLENOL) 500 MG tablet Take 1 tablet by mouth 4 times daily as needed for Pain 12/31/18  Yes Esdras Vo MD Jam   glimepiride (AMARYL) 4 MG tablet Take 4 mg by mouth every morning (before breakfast)   Yes Historical Provider, MD   naproxen sodium (ALEVE) 220 MG tablet Take 220 mg by mouth 2 times daily (with meals)   Yes Historical Provider, MD   losartan (COZAAR) 100 MG tablet Take 1 tablet by mouth daily  Patient taking differently: Take 50 mg by mouth 2 times daily  1/24/16  Yes Romayne Part, MD   metformin (GLUCOPHAGE) 500 MG tablet Take 1 tablet by mouth 2 times daily (with meals). 3/4/13  Yes Allan Nava MD        Allergies:  Bactrim [sulfamethoxazole-trimethoprim] and Lisinopril     Review of Systems:   · Constitutional: there has been no unanticipated weight loss. There's been no change in energy level, sleep pattern, or activity level. · Eyes: No visual changes or diplopia. No scleral icterus. · ENT: No Headaches, hearing loss or vertigo. No mouth sores or sore throat. · Cardiovascular: Reviewed in HPI  · Respiratory: No cough or wheezing, no sputum production. No hematemesis. · Gastrointestinal: No abdominal pain, appetite loss, blood in stools. No change in bowel or bladder habits. · Genitourinary: No dysuria, trouble voiding, or hematuria. · Musculoskeletal:  No gait disturbance, weakness or joint complaints. +left wrist in brace   · Integumentary: No rash or pruritis. · Neurological: No headache, diplopia, change in muscle strength, numbness or tingling. No change in gait, balance, coordination, mood, affect, memory, mentation, behavior. · Psychiatric: No anxiety, no depression. · Endocrine: No malaise, fatigue or temperature intolerance. No excessive thirst, fluid intake, or urination. No tremor. · Hematologic/Lymphatic: No abnormal bruising or bleeding, blood clots or swollen lymph nodes. · Allergic/Immunologic: No nasal congestion or hives.     Physical Examination:    Vitals:    06/09/21 1419   BP: (!) 160/100   Pulse: 111   SpO2: 97%        Wt Readings from Last 1 Encounters:   06/09/21 199 lb 6.4 oz (90.4 kg)       Constitutional and General Appearance: NAD   Skin:good turgor,intact without lesions  HEENT: EOMI ,normal  Neck:no JVD\    Respiratory:  · Normal excursion and expansion without use of accessory muscles  · Resp Auscultation: Normal breath sounds without dullness  Cardiovascular:  · The apical impulses not displaced  · Heart tones are crisp and normal  · Cervical veins are not engorged  · The carotid upstroke is normal in amplitude and contour without delay or bruit  · Peripheral pulses are symmetrical and full  · There is no clubbing, cyanosis of the extremities. · No edema  · Femoral Arteries: 2+ and equal  · Pedal Pulses: 2+ and equal   Abdomen:  · No masses or tenderness  · Liver/Spleen: No Abnormalities Noted  Neurological/Psychiatric:  · Alert and oriented in all spheres  · Moves all extremities well  · Exhibits normal gait balance and coordination  · No abnormalities of mood, affect, memory, mentation, or behavior are noted      Assessment:    1. Pre-op exam -OK to proceed w/ wrist  surgery from my stand point. 2. Coronary artery disease involving native coronary artery of native heart with angina pectoris ([facility] [address]   1/17: S/p PCI of Diag  Patient denies any anginal symptoms   3. Hypertension -controlled      4. Hyperlipidemia - managed by PCP          Plan:  OK to proceed w/ surgery from my stand point. Derrick Sanford has a stable cardiac status. Cardiac test and lab results personally reviewed by me during this office visit and discussed. No med changes. EKG and clearance letter faxed to [phone]- copy provided to patient   Continue risk factor modifications. Call for any change in symptoms, call to report any changes in shortness of breath or development of chest pain with activity. Return for regular follow up in 12 months. I appreciate the opportunity of cooperating in the care of this individual.    Crys Bone.  Catarino Greenberg M.D., [facility]

## 2021-06-10 ENCOUNTER — TELEPHONE (OUTPATIENT)
Dept: CARDIOLOGY CLINIC | Age: 71
End: 2021-06-10

## 2021-08-19 ENCOUNTER — HOSPITAL ENCOUNTER (OUTPATIENT)
Age: 71
Setting detail: OBSERVATION
Discharge: HOME | End: 2021-08-20
Attending: EMERGENCY MEDICINE | Admitting: INTERNAL MEDICINE
Payer: COMMERCIAL

## 2021-08-19 ENCOUNTER — APPOINTMENT (OUTPATIENT)
Dept: GENERAL RADIOLOGY | Age: 71
End: 2021-08-19
Payer: COMMERCIAL

## 2021-08-19 DIAGNOSIS — R07.9 CHEST PAIN, UNSPECIFIED TYPE: ICD-10-CM

## 2021-08-19 DIAGNOSIS — I10 UNCONTROLLED HYPERTENSION: Primary | ICD-10-CM

## 2021-08-19 LAB
A/G RATIO: 1.5 (ref 1.1–2.2)
ALBUMIN SERPL-MCNC: 4.3 G/DL (ref 3.4–5)
ALP BLD-CCNC: 126 U/L (ref 40–129)
ALT SERPL-CCNC: 15 U/L (ref 10–40)
ANION GAP SERPL CALCULATED.3IONS-SCNC: 12 MMOL/L (ref 3–16)
AST SERPL-CCNC: 16 U/L (ref 15–37)
BASOPHILS ABSOLUTE: 0.1 K/UL (ref 0–0.2)
BASOPHILS RELATIVE PERCENT: 0.6 %
BILIRUB SERPL-MCNC: 0.4 MG/DL (ref 0–1)
BUN BLDV-MCNC: 11 MG/DL (ref 7–20)
CALCIUM SERPL-MCNC: 10 MG/DL (ref 8.3–10.6)
CHLORIDE BLD-SCNC: 101 MMOL/L (ref 99–110)
CO2: 24 MMOL/L (ref 21–32)
CREAT SERPL-MCNC: <0.5 MG/DL (ref 0.6–1.2)
EOSINOPHILS ABSOLUTE: 0.3 K/UL (ref 0–0.6)
EOSINOPHILS RELATIVE PERCENT: 3 %
GFR AFRICAN AMERICAN: >60
GFR NON-AFRICAN AMERICAN: >60
GLOBULIN: 2.9 G/DL
GLUCOSE BLD-MCNC: 239 MG/DL (ref 70–99)
HCT VFR BLD CALC: 40.5 % (ref 36–48)
HEMOGLOBIN: 14.4 G/DL (ref 12–16)
LYMPHOCYTES ABSOLUTE: 1.8 K/UL (ref 1–5.1)
LYMPHOCYTES RELATIVE PERCENT: 20.6 %
MCH RBC QN AUTO: 35.4 PG (ref 26–34)
MCHC RBC AUTO-ENTMCNC: 35.5 G/DL (ref 31–36)
MCV RBC AUTO: 99.7 FL (ref 80–100)
MONOCYTES ABSOLUTE: 0.6 K/UL (ref 0–1.3)
MONOCYTES RELATIVE PERCENT: 6.9 %
NEUTROPHILS ABSOLUTE: 5.9 K/UL (ref 1.7–7.7)
NEUTROPHILS RELATIVE PERCENT: 68.9 %
PDW BLD-RTO: 15.2 % (ref 12.4–15.4)
PLATELET # BLD: 234 K/UL (ref 135–450)
PMV BLD AUTO: 6.7 FL (ref 5–10.5)
POTASSIUM REFLEX MAGNESIUM: 4.2 MMOL/L (ref 3.5–5.1)
PRO-BNP: 87 PG/ML (ref 0–124)
RBC # BLD: 4.06 M/UL (ref 4–5.2)
SODIUM BLD-SCNC: 137 MMOL/L (ref 136–145)
TOTAL PROTEIN: 7.2 G/DL (ref 6.4–8.2)
TROPONIN: <0.01 NG/ML
WBC # BLD: 8.5 K/UL (ref 4–11)

## 2021-08-19 PROCEDURE — 36415 COLL VENOUS BLD VENIPUNCTURE: CPT

## 2021-08-19 PROCEDURE — 85025 COMPLETE CBC W/AUTO DIFF WBC: CPT

## 2021-08-19 PROCEDURE — 71045 X-RAY EXAM CHEST 1 VIEW: CPT

## 2021-08-19 PROCEDURE — 84484 ASSAY OF TROPONIN QUANT: CPT

## 2021-08-19 PROCEDURE — 80053 COMPREHEN METABOLIC PANEL: CPT

## 2021-08-19 PROCEDURE — 93005 ELECTROCARDIOGRAM TRACING: CPT | Performed by: EMERGENCY MEDICINE

## 2021-08-19 PROCEDURE — 6370000000 HC RX 637 (ALT 250 FOR IP): Performed by: NURSE PRACTITIONER

## 2021-08-19 PROCEDURE — 83880 ASSAY OF NATRIURETIC PEPTIDE: CPT

## 2021-08-19 RX ORDER — ASPIRIN 81 MG/1
324 TABLET, CHEWABLE ORAL ONCE
Status: COMPLETED | OUTPATIENT
Start: 2021-08-19 | End: 2021-08-19

## 2021-08-19 RX ORDER — LABETALOL HYDROCHLORIDE 5 MG/ML
10 INJECTION, SOLUTION INTRAVENOUS ONCE
Status: COMPLETED | OUTPATIENT
Start: 2021-08-20 | End: 2021-08-20

## 2021-08-19 RX ORDER — NITROGLYCERIN 0.4 MG/1
0.4 TABLET SUBLINGUAL ONCE
Status: COMPLETED | OUTPATIENT
Start: 2021-08-19 | End: 2021-08-19

## 2021-08-19 RX ORDER — HYDRALAZINE HYDROCHLORIDE 20 MG/ML
10 INJECTION INTRAMUSCULAR; INTRAVENOUS ONCE
Status: DISCONTINUED | OUTPATIENT
Start: 2021-08-19 | End: 2021-08-19

## 2021-08-19 RX ADMIN — NITROGLYCERIN 0.4 MG: 0.4 TABLET SUBLINGUAL at 23:14

## 2021-08-19 RX ADMIN — ASPIRIN 324 MG: 81 TABLET, CHEWABLE ORAL at 22:10

## 2021-08-19 ASSESSMENT — HEART SCORE: ECG: 0

## 2021-08-20 VITALS
SYSTOLIC BLOOD PRESSURE: 158 MMHG | OXYGEN SATURATION: 95 % | HEIGHT: 65 IN | RESPIRATION RATE: 16 BRPM | DIASTOLIC BLOOD PRESSURE: 75 MMHG | HEART RATE: 86 BPM | BODY MASS INDEX: 34.05 KG/M2 | TEMPERATURE: 98.2 F | WEIGHT: 204.37 LBS

## 2021-08-20 PROBLEM — R07.9 CHEST PAIN: Status: ACTIVE | Noted: 2021-08-20

## 2021-08-20 PROBLEM — I16.0 HYPERTENSIVE URGENCY: Status: ACTIVE | Noted: 2021-08-20

## 2021-08-20 LAB
D DIMER: 232 NG/ML DDU (ref 0–229)
EKG ATRIAL RATE: 101 BPM
EKG DIAGNOSIS: NORMAL
EKG P AXIS: 64 DEGREES
EKG P-R INTERVAL: 164 MS
EKG Q-T INTERVAL: 346 MS
EKG QRS DURATION: 74 MS
EKG QTC CALCULATION (BAZETT): 448 MS
EKG R AXIS: 50 DEGREES
EKG T AXIS: 62 DEGREES
EKG VENTRICULAR RATE: 101 BPM
LV EF: 68 %
LVEF MODALITY: NORMAL
TROPONIN: <0.01 NG/ML
TROPONIN: <0.01 NG/ML

## 2021-08-20 PROCEDURE — 99284 EMERGENCY DEPT VISIT MOD MDM: CPT

## 2021-08-20 PROCEDURE — 3430000000 HC RX DIAGNOSTIC RADIOPHARMACEUTICAL: Performed by: INTERNAL MEDICINE

## 2021-08-20 PROCEDURE — 2580000003 HC RX 258: Performed by: INTERNAL MEDICINE

## 2021-08-20 PROCEDURE — 85379 FIBRIN DEGRADATION QUANT: CPT

## 2021-08-20 PROCEDURE — 2500000003 HC RX 250 WO HCPCS: Performed by: EMERGENCY MEDICINE

## 2021-08-20 PROCEDURE — 6370000000 HC RX 637 (ALT 250 FOR IP): Performed by: INTERNAL MEDICINE

## 2021-08-20 PROCEDURE — 84484 ASSAY OF TROPONIN QUANT: CPT

## 2021-08-20 PROCEDURE — A9502 TC99M TETROFOSMIN: HCPCS | Performed by: INTERNAL MEDICINE

## 2021-08-20 PROCEDURE — G0378 HOSPITAL OBSERVATION PER HR: HCPCS

## 2021-08-20 PROCEDURE — 96374 THER/PROPH/DIAG INJ IV PUSH: CPT

## 2021-08-20 PROCEDURE — 93010 ELECTROCARDIOGRAM REPORT: CPT | Performed by: INTERNAL MEDICINE

## 2021-08-20 PROCEDURE — 93017 CV STRESS TEST TRACING ONLY: CPT | Performed by: INTERNAL MEDICINE

## 2021-08-20 PROCEDURE — 78452 HT MUSCLE IMAGE SPECT MULT: CPT

## 2021-08-20 RX ORDER — POTASSIUM CHLORIDE 20 MEQ/1
40 TABLET, EXTENDED RELEASE ORAL PRN
Status: DISCONTINUED | OUTPATIENT
Start: 2021-08-20 | End: 2021-08-20 | Stop reason: HOSPADM

## 2021-08-20 RX ORDER — ACETAMINOPHEN 650 MG/1
650 SUPPOSITORY RECTAL EVERY 6 HOURS PRN
Status: DISCONTINUED | OUTPATIENT
Start: 2021-08-20 | End: 2021-08-20 | Stop reason: HOSPADM

## 2021-08-20 RX ORDER — GLIMEPIRIDE 2 MG/1
4 TABLET ORAL
Status: DISCONTINUED | OUTPATIENT
Start: 2021-08-20 | End: 2021-08-20 | Stop reason: HOSPADM

## 2021-08-20 RX ORDER — NAPROXEN 250 MG/1
250 TABLET ORAL 2 TIMES DAILY WITH MEALS
Status: DISCONTINUED | OUTPATIENT
Start: 2021-08-20 | End: 2021-08-20 | Stop reason: HOSPADM

## 2021-08-20 RX ORDER — SODIUM CHLORIDE 9 MG/ML
25 INJECTION, SOLUTION INTRAVENOUS PRN
Status: DISCONTINUED | OUTPATIENT
Start: 2021-08-20 | End: 2021-08-20 | Stop reason: HOSPADM

## 2021-08-20 RX ORDER — NITROGLYCERIN 0.4 MG/1
0.4 TABLET SUBLINGUAL EVERY 5 MIN PRN
Status: DISCONTINUED | OUTPATIENT
Start: 2021-08-20 | End: 2021-08-20 | Stop reason: HOSPADM

## 2021-08-20 RX ORDER — POTASSIUM CHLORIDE 7.45 MG/ML
10 INJECTION INTRAVENOUS PRN
Status: DISCONTINUED | OUTPATIENT
Start: 2021-08-20 | End: 2021-08-20 | Stop reason: HOSPADM

## 2021-08-20 RX ORDER — ATENOLOL 50 MG/1
50 TABLET ORAL DAILY
Qty: 30 TABLET | Refills: 3 | Status: ON HOLD
Start: 2021-08-20 | End: 2022-06-29 | Stop reason: SINTOL

## 2021-08-20 RX ORDER — ACETAMINOPHEN 325 MG/1
650 TABLET ORAL EVERY 6 HOURS PRN
Status: DISCONTINUED | OUTPATIENT
Start: 2021-08-20 | End: 2021-08-20 | Stop reason: HOSPADM

## 2021-08-20 RX ORDER — SODIUM CHLORIDE 0.9 % (FLUSH) 0.9 %
5-40 SYRINGE (ML) INJECTION EVERY 12 HOURS SCHEDULED
Status: DISCONTINUED | OUTPATIENT
Start: 2021-08-20 | End: 2021-08-20 | Stop reason: HOSPADM

## 2021-08-20 RX ORDER — 0.9 % SODIUM CHLORIDE 0.9 %
500 INTRAVENOUS SOLUTION INTRAVENOUS PRN
Status: DISCONTINUED | OUTPATIENT
Start: 2021-08-20 | End: 2021-08-20 | Stop reason: HOSPADM

## 2021-08-20 RX ORDER — HYDRALAZINE HYDROCHLORIDE 25 MG/1
25 TABLET, FILM COATED ORAL EVERY 4 HOURS PRN
Status: DISCONTINUED | OUTPATIENT
Start: 2021-08-20 | End: 2021-08-20 | Stop reason: DRUGHIGH

## 2021-08-20 RX ORDER — LOSARTAN POTASSIUM 25 MG/1
50 TABLET ORAL 2 TIMES DAILY
Status: DISCONTINUED | OUTPATIENT
Start: 2021-08-20 | End: 2021-08-20 | Stop reason: HOSPADM

## 2021-08-20 RX ORDER — HYDRALAZINE HYDROCHLORIDE 20 MG/ML
10 INJECTION INTRAMUSCULAR; INTRAVENOUS EVERY 6 HOURS PRN
Status: DISCONTINUED | OUTPATIENT
Start: 2021-08-20 | End: 2021-08-20 | Stop reason: HOSPADM

## 2021-08-20 RX ORDER — POTASSIUM CHLORIDE 7.45 MG/ML
10 INJECTION INTRAVENOUS PRN
Status: DISCONTINUED | OUTPATIENT
Start: 2021-08-20 | End: 2021-08-20 | Stop reason: SDUPTHER

## 2021-08-20 RX ORDER — ONDANSETRON 2 MG/ML
4 INJECTION INTRAMUSCULAR; INTRAVENOUS EVERY 6 HOURS PRN
Status: DISCONTINUED | OUTPATIENT
Start: 2021-08-20 | End: 2021-08-20 | Stop reason: HOSPADM

## 2021-08-20 RX ORDER — SODIUM CHLORIDE 0.9 % (FLUSH) 0.9 %
10 SYRINGE (ML) INJECTION PRN
Status: DISCONTINUED | OUTPATIENT
Start: 2021-08-20 | End: 2021-08-20 | Stop reason: HOSPADM

## 2021-08-20 RX ORDER — ONDANSETRON 4 MG/1
4 TABLET, ORALLY DISINTEGRATING ORAL EVERY 8 HOURS PRN
Status: DISCONTINUED | OUTPATIENT
Start: 2021-08-20 | End: 2021-08-20 | Stop reason: HOSPADM

## 2021-08-20 RX ORDER — POTASSIUM CHLORIDE 20 MEQ/1
40 TABLET, EXTENDED RELEASE ORAL PRN
Status: DISCONTINUED | OUTPATIENT
Start: 2021-08-20 | End: 2021-08-20 | Stop reason: SDUPTHER

## 2021-08-20 RX ORDER — ATORVASTATIN CALCIUM 40 MG/1
40 TABLET, FILM COATED ORAL NIGHTLY
Status: DISCONTINUED | OUTPATIENT
Start: 2021-08-20 | End: 2021-08-20 | Stop reason: HOSPADM

## 2021-08-20 RX ADMIN — LOSARTAN POTASSIUM 50 MG: 25 TABLET, FILM COATED ORAL at 02:29

## 2021-08-20 RX ADMIN — GLIMEPIRIDE 4 MG: 2 TABLET ORAL at 11:48

## 2021-08-20 RX ADMIN — TETROFOSMIN 10 MILLICURIE: 1.38 INJECTION, POWDER, LYOPHILIZED, FOR SOLUTION INTRAVENOUS at 07:53

## 2021-08-20 RX ADMIN — TETROFOSMIN 30 MILLICURIE: 1.38 INJECTION, POWDER, LYOPHILIZED, FOR SOLUTION INTRAVENOUS at 09:28

## 2021-08-20 RX ADMIN — LOSARTAN POTASSIUM 50 MG: 25 TABLET, FILM COATED ORAL at 11:48

## 2021-08-20 RX ADMIN — Medication 10 ML: at 11:49

## 2021-08-20 RX ADMIN — LABETALOL HYDROCHLORIDE 10 MG: 5 INJECTION INTRAVENOUS at 00:22

## 2021-08-20 RX ADMIN — NAPROXEN 250 MG: 250 TABLET ORAL at 11:48

## 2021-08-20 RX ADMIN — METFORMIN HYDROCHLORIDE 500 MG: 500 TABLET ORAL at 11:49

## 2021-08-20 ASSESSMENT — PAIN SCALES - GENERAL
PAINLEVEL_OUTOF10: 0

## 2021-08-20 ASSESSMENT — ENCOUNTER SYMPTOMS
VOMITING: 0
EYE PAIN: 0
NAUSEA: 0
CHEST TIGHTNESS: 1
SHORTNESS OF BREATH: 0
PHOTOPHOBIA: 0

## 2021-08-20 NOTE — PROGRESS NOTES
4 Eyes Skin Assessment     NAME:  Cosmo Justice  YOB: 1950  MEDICAL RECORD NUMBER:  [id number]    The patient is being assess for  Admission    I agree that 2 RN's have performed a thorough Head to Toe Skin Assessment on the patient. ALL assessment sites listed below have been assessed. Areas assessed by both nurses:    Head, Face, Ears, Shoulders, Back, Chest, Arms, Elbows, Hands, Sacrum. Buttock, Coccyx, Ischium and Legs. Feet and Heels        Does the Patient have a Wound?  No noted wound(s)       Rodriguez Prevention initiated:  NA   Wound Care Orders initiated:  NA    Pressure Injury (Stage 3,4, Unstageable, DTI, NWPT, and Complex wounds) if present place consult order under [de-identified] No    New and Established Ostomies if present place consult order under : NA      Nurse 1 eSignature: Electronically signed by Itzel Shipman RN on 8/20/21 at 2:44 AM EDT    **SHARE this note so that the co-signing nurse is able to place an eSignature**    Nurse 2 eSignature: Electronically signed by Mikey Monge RN on 8/20/21 at 1:56 PM EDT

## 2021-08-20 NOTE — PROGRESS NOTES
Per discussion with patient about the new medication and side effects, she has decided to take the Atenolol at bedtime and continue her Cozaar in the morning.

## 2021-08-20 NOTE — CARE COORDINATION
Discharge Planning:  I have reviewed the patient's medical history in detail and updated the computerized patient record. Patient independent prior to admission. There are no needs identified at this time. If something specific is identified, please notify Discharge Planner.     Electronically signed by Libby Gipson RN on 8/20/2021 at 11:45 AM

## 2021-08-20 NOTE — PROGRESS NOTES
Pt admitted to CVU 7. Attached to monitors, bedside table and call light in reach. All questions answered.

## 2021-08-20 NOTE — PLAN OF CARE
Problem: Cardiac Output - Decreased:  Goal: Hemodynamic stability will improve  Description: Hemodynamic stability will improve  Outcome: Ongoing  Note: Pulse rate and rhythm, peripheral pulses, and capillary refill assessed every shift with assessment. General color and body temperature monitored throughout shift and with vitals. Assess for edema with head to toe assessment. Administer treatments and medications as ordered. Monitor patient's weight. Problem: Pain:  Goal: Control of acute pain  Description: Control of acute pain  Outcome: Ongoing  Note: Pt alert and oriented. Pt able to communicate present pain and use the pain scale appropriately. Nonpharmacological pain reducers and pain medication offered as needed. Will cont to monitor.

## 2021-08-20 NOTE — ED PROVIDER NOTES
[address] ENCOUNTER        Pt Name: Maralyn Nageotte  MRN: [id number]  [place] 1950  Date of evaluation: 8/19/2021  Provider: MAXIM Dela Cruz - GIOVANA  PCP: Ashleigh Lovelace MD  Note Started: 9:34 PM EDT        I have seen and evaluated this patient with my supervising physician Dr. Eliud Saul   Patient presents with    Hypertension     pt concerned she took double dose of BP meds but unsure. BP more elev than usual.        HISTORY OF PRESENT ILLNESS   (Location, Timing/Onset, Context/Setting, Quality, Duration, Modifying Factors, Severity, Associated Signs and Symptoms)  Note limiting factors. Chief Complaint: Hypertension, chest pain    Maralyn Nageotte is a 70 y.o. female with medical history of arthritis, asthma, CAD with stent, DM, GERD, hypertension, HLD, MVP, rotator cuff tear who presents the emergency department with complaints of chest pain with elevated blood pressure readings. Patient states that approximately 1430 today she took her losartan. She thought it was possibly 100 mg pill and she took 2 by accident. She was previously given losartan 50 mg pills and had to take 2 to equal her prescribed dose of 100 mg. She has noticed chest pain across her chest present for approximately the past 2 to 3 days. She did tell her PCP and he had started her on omeprazole, although this has not alleviated her symptoms. He denies any associated nausea, vomiting, diarrhea, leg swelling, headache, neck pain, back pain, light headed, leg swelling, rashes, fevers, dizzy, syncope, cough, wheezing, palpitations, or being anticoagulated. Former smoker, denies alcohol use or street drugs. Nursing Notes were all reviewed and agreed with or any disagreements were addressed in the HPI.     Cath 1/31/2017  Procedure  [facility]  PCI of D1 with 2.25X18 [place] TORRI to 12atm    Coronary angiography  LM Normal  LAD                 Mid 30%, mid 40%  D1                    100% prox  RCA                 40% PLB mid  LVG                 55%  LVEDP            12mmHg     ~Recommendation  Continued aggressive medical tx and risk factor modification       REVIEW OF SYSTEMS    (2-9 systems for level 4, 10 or more for level 5)     Review of Systems    Positives and Pertinent negatives as per HPI. Except as noted above in the ROS, all other systems were reviewed and negative. PAST MEDICAL HISTORY     Past Medical History:   Diagnosis Date    Arthritis     Asthma     Coronary artery disease involving native coronary artery of native heart with angina pectoris ([facility] [address] 2/10/2017    Diabetes mellitus ([facility] [address]     GERD (gastroesophageal reflux disease) 1/30/2017    Hypertension     Mitral valve prolapse     Mixed hyperlipidemia 1/31/2018    Rotator cuff tear 10/23/2013         SURGICAL HISTORY     Past Surgical History:   Procedure Laterality Date    CORONARY ANGIOPLASTY WITH STENT PLACEMENT      TONSILLECTOMY           CURRENTMEDICATIONS       Previous Medications    ACETAMINOPHEN (TYLENOL) 500 MG TABLET    Take 1 tablet by mouth 4 times daily as needed for Pain    GLIMEPIRIDE (AMARYL) 4 MG TABLET    Take 4 mg by mouth every morning (before breakfast)    LOSARTAN (COZAAR) 100 MG TABLET    Take 1 tablet by mouth daily    METFORMIN (GLUCOPHAGE) 500 MG TABLET    Take 1 tablet by mouth 2 times daily (with meals).     NAPROXEN SODIUM (ALEVE) 220 MG TABLET    Take 220 mg by mouth 2 times daily (with meals)         ALLERGIES     Bactrim [sulfamethoxazole-trimethoprim] and Lisinopril    FAMILYHISTORY       Family History   Problem Relation Age of Onset    Diabetes Mother     Kidney Disease Mother     Diabetes Father     Stroke Father     Heart Disease Father     Arthritis Other     Hypertension Other           SOCIAL HISTORY       Social History     Tobacco Use    Smoking status: Former Smoker     Quit date: 2002     Years since quittin.0    Smokeless tobacco: Never Used   Substance Use Topics    Alcohol use: No    Drug use: No       SCREENINGS      Heart Score for chest pain patients  History: Moderately Suspicious  ECG: Normal  Patient Age: > 65 years  *Risk factors for Atherosclerotic disease: Coronary Artery Disease, Obesity, Hypertension, Diabetes Mellitus, Hypercholesterolemia, Positive family History  Risk Factors: > 3 Risk factors or history of atherosclerotic disease*  Troponin: < 1X normal limit  Heart Score Total: 5      PHYSICAL EXAM    (up to 7 for level 4, 8 or more for level 5)     ED Triage Vitals [21 2104]   BP Temp Temp Source Pulse Resp SpO2 Height Weight   (!) 181/128 97.1 °F (36.2 °C) Oral 98 16 96 % 5' 5\" (1.651 m) 200 lb (90.7 kg)       Physical Exam  Vitals and nursing note reviewed. Constitutional:       General: She is awake. Appearance: Normal appearance. She is well-developed. She is morbidly obese. HENT:      Head: Normocephalic and atraumatic. Nose: Nose normal.   Eyes:      General:         Right eye: No discharge. Left eye: No discharge. Cardiovascular:      Rate and Rhythm: Normal rate and regular rhythm. Heart sounds: Normal heart sounds. Pulmonary:      Effort: Pulmonary effort is normal. No respiratory distress. Breath sounds: Normal breath sounds. Abdominal:      General: Bowel sounds are normal.      Palpations: Abdomen is soft. Tenderness: There is no abdominal tenderness. Musculoskeletal:         General: Normal range of motion. Cervical back: Normal range of motion. Right lower leg: No edema. Left lower leg: No edema. Skin:     General: Skin is warm and dry. Coloration: Skin is not pale. Neurological:      Mental Status: She is alert and oriented to person, place, and time. Psychiatric:         Behavior: Behavior normal. Behavior is cooperative.          DIAGNOSTIC RESULTS   LABS:    Labs Reviewed   CBC WITH AUTO DIFFERENTIAL - Abnormal; Notable for the following components:       Result Value    MCH 35.4 (*)     All other components within normal limits    Narrative:     Performed at:  [facility]  [employer], [employer] [employer]   Phone [phone]   COMPREHENSIVE METABOLIC PANEL W/ REFLEX TO MG FOR LOW K - Abnormal; Notable for the following components:    Glucose 239 (*)     CREATININE <0.5 (*)     All other components within normal limits    Narrative:     Performed at:  [facility]  [employer], [employer]   Phone [phone]   TROPONIN    Narrative:     Performed at:  [facility]  [employer], [employer]   Phone [phone]    Narrative:     Performed at:  [facility]  [employer]   Phone [phone]       When ordered only abnormal lab results are displayed. All other labs were within normal range or not returned as of this dictation. EKG: When ordered, EKG's are interpreted by the Emergency Department Physician in the absence of a cardiologist.  Please see their note for interpretation of EKG. RADIOLOGY:   Non-plain film images such as CT, Ultrasound and MRI are read by the radiologist. Plain radiographic images are visualized and preliminarily interpreted by the ED Provider with the below findings:        Interpretation per the Radiologist below, if available at the time of this note:    XR CHEST PORTABLE   Final Result   Stable chronic changes with no acute abnormality seen. No results found.         PROCEDURES   Unless otherwise noted below, none     Procedures    CRITICAL CARE TIME   N/A    CONSULTS:  IP CONSULT TO INTERNAL MEDICINE      EMERGENCY DEPARTMENT COURSE and DIFFERENTIAL DIAGNOSIS/MDM:   Vitals:    Vitals:    08/20/21 0000 08/20/21 0030 08/20/21 0045 08/20/21 0100   BP: (!) 213/99 (!) 142/80 (!) 157/84 (!) 164/81   Pulse:  83 78 76   Resp:  23 13 23   Temp:       TempSrc:       SpO2:  94% 95% 94%   Weight:       Height:           Patient was given the following medications:  Medications   aspirin chewable tablet 324 mg (324 mg Oral Given 8/19/21 2210)   nitroGLYCERIN (NITROSTAT) SL tablet 0.4 mg (0.4 mg Sublingual Given 8/19/21 2314)   labetalol (NORMODYNE;TRANDATE) injection 10 mg (10 mg Intravenous Given 8/20/21 0022)           ED COURSE & MEDICAL DECISION MAKING    - The patient presented to the ER with complaints of  hypertension, chest pain. Vital signs were reviewed. Exam well-developed, well-nourished female who appears uncomfortable. Peripheral IV placed. Labs, Imaging ordered. - Pertinent Labs & Imaging studies reviewed. (See chart for details)   -  Patient seen and evaluated in the emergency department. -  Triage and nursing notes reviewed and incorporated. -  Old chart records reviewed and incorporated.  -  Dr. Moses Evans emergency department attending assessed the patient  -  Differential diagnosis includes: ACS, MI, costochondritis, pleurisy, aneurysm, dissection, bronchitis, pneumonia, pleural effusion, CHF, cardiomegaly, esophageal rupture, endocarditis, pericarditis, PE, pneumothorax, tamponade versus COVID-19  -  Work-up included:  See above  -  ED treatment included:   Aspirin, nitroglycerin, labetalol  -  Results discussed with patient. Isidra Mendez is a 70-year-old female presents the emergency department with complaints of medication mishap. States she believes she excellently took double her losartan as they had recently changed up her pills and she got a new pill bottle today. Upon ED arrival her blood pressure was checked and was found to be elevated. Patient also complains of some chest pain across her chest for the past 3 days. She was seen by PCP and thought it was possibly GERD and started her on omeprazole.

## 2021-08-20 NOTE — PLAN OF CARE
Problem: Discharge Planning:  Goal: Discharged to appropriate level of care  Description: Discharged to appropriate level of care  Outcome: Completed     Problem: Cardiac Output - Decreased:  Goal: Hemodynamic stability will improve  Description: Hemodynamic stability will improve  8/20/2021 1357 by Emigdio Lam RN  Outcome: Completed  8/20/2021 0223 by Maurilio Woods RN  Outcome: Ongoing  Note: Pulse rate and rhythm, peripheral pulses, and capillary refill assessed every shift with assessment. General color and body temperature monitored throughout shift and with vitals. Assess for edema with head to toe assessment. Administer treatments and medications as ordered. Monitor patient's weight. Problem: Pain:  Description: Pain management should include both nonpharmacologic and pharmacologic interventions. Goal: Pain level will decrease  Description: Pain level will decrease  Outcome: Completed  Goal: Control of acute pain  Description: Control of acute pain  8/20/2021 1357 by Emigdio Lam RN  Outcome: Completed  8/20/2021 0223 by Maurilio Woods RN  Outcome: Ongoing  Note: Pt alert and oriented. Pt able to communicate present pain and use the pain scale appropriately. Nonpharmacological pain reducers and pain medication offered as needed. Will cont to monitor.    Goal: Control of chronic pain  Description: Control of chronic pain  Outcome: Completed

## 2021-08-20 NOTE — DISCHARGE SUMMARY
Physician Discharge Summary       Cosmo Justice  1950  MRN: [id number]    Admit Date: 8/19/2021  Discharge Date: No discharge date for patient encounter. Discharge Unit: [facility] [address] [place] [address]  [address] [phone]  Dept: [phone]  Loc: [phone]    Attending MD: Magaly Smalls MD  Discharging MD: Magaly Smalls MD  PCP: Meagan Adam MD [address] [place] [address] 10 / [facility] [phone]      Admission Diagnosis: Hypertensive urgency [I16.0]  Uncontrolled hypertension [I10]  Chest pain, unspecified type [R07.9]  Chest pain [R07.9]    Discharge Diagnosis: Chest pain, non cardiac in origin (unable to determine cause)    Full Hospital Problem List:  Active Hospital Problems    Diagnosis Date Noted    Hypertensive urgency [I16.0] 08/20/2021    Chest pain [R07.9] 08/20/2021    Mixed hyperlipidemia [E78.2] 01/31/2018    GERD (gastroesophageal reflux disease) [K21.9] 01/30/2017    Type 2 diabetes mellitus without complication ([facility] [address] [K50.6] 01/23/2016           Hospital Course:    Pt was admitted for chest pain. Placed on telemetry and r/o MI pathway. Serial cardiac enzymes were negative. Pt underwent stress test, results of which are negative. Patient is chest pain free at time of discharge  At this time, etiology of the chest pain is felt to be due to extremely elevated BP on presentation     changes are made to patients medications. Atenolol 25mg qd is added  Pt is to follow up with PMD in 1-2 weeks time for bp recheck. Consults made during Hospitalization:  IP CONSULT TO INTERNAL MEDICINE    Treatment team at time of Discharge: Treatment Team: Attending Provider: Magaly Smalls MD; Consulting Physician: Magaly Smalls MD; Respiratory Therapist (Day): Mony Aldridge RCP;  Registered Nurse: Alton Smith RN; Utilization Reviewer: Dilcia Garcia RN    Condition at discharge: Stable    Imaging Results:  XR CHEST PORTABLE    Result Date: 2021  EXAMINATION: ONE XRAY VIEW OF THE CHEST 2021 10:23 pm COMPARISON: 2018 HISTORY: ORDERING SYSTEM PROVIDED HISTORY: chest pain TECHNOLOGIST PROVIDED HISTORY: Reason for exam:->chest pain Reason for Exam: chest pain Acuity: Acute Type of Exam: Initial FINDINGS: The heart is normal.  The pulmonary vessels are normal.  The lungs are mildly hyperinflated and emphysematous. No consolidation or effusion is seen. The bones are intact. Stable chronic changes with no acute abnormality seen. NM Cardiac Stress Test Nuclear Imaging    Result Date: 2021  Cardiac Perfusion Imaging  Demographics   Patient Name       Estefania Rodríguez   Date of Study      2021         Gender              Female   Patient Number     [id number]         Date of Birth       1950   Visit Number       [id number]          Age                 70 year(s)   Accession Number   [id number]         Room Number         7924   Corporate ID       [id number]           NM Technician       Gayatri Hanks, RT   Nurse              Sherice Harley, RN  Interpreting        Izabella Vicente MD,                                        Physician           [facility]   Ordering Physician Tiara Pond MD   The procedure was explained in detail to the patient. Risks,  complications and alternative treatments were reviewed. Written consent  was obtained. Procedure Procedure Type:   Nuclear Stress Test:Exercise, NM MYOCARDIAL SPECT REST EXERCISE OR RX   Study location: [facility] - Nuclear Medicine   Indications: Chest pain. Hospital Status: Inpatient. Height: 65 inches Weight: 204 pounds  Risk Factors   The patient risk factors include:prior PCI;obesity, physical activity,  former tobacco use, treated and controlled hypertension, family history of  premature CAD, prior MI and ( years not smokin). Conclusions   Summary  Normal myocardial perfusion study. Normal LV size and systolic function. Stress Protocols   Resting ECG  Normal sinus rhythm. Normal ECG. Resting HR:85 bpm  Resting BP:163/101 mmHg  Stress Protocol:Exercise - Modified Rad  Peak HR:141 bpm                            HR/BP product:38088  Peak BP:212/103 mmHg                       Max exercise: 1.9 METS  Predicted HR: 149 bpm  % of predicted HR: 95  Test duration:3 min  Reason for termination:Physiologic Maximum   ECG Findings  Normal response to exercise stress . Symptoms  There was stress induced fatigue and shortness of breath. Peak  O2 sat 95%. Denies any chest pain. Symptoms resolved with rest.   Complications  Procedure complication was none. Stress Interpretation  ECG portion of stress test is normal.   Imaging Protocols   - One Day   Rest                          Stress   Isotope:Myoview/Tetrofosmin   Isotope: Myoview/Tetrofosmin  Isotope dose:10.08 mCi        Isotope dose:31.05 mCi  Administration Route:I.V.      Administration Route:I.V.  Date:08/20/2021 07:48         Date:08/20/2021 09:27                                 Technique:      Gated  Imaging Results    Stress ejection    Ejection fraction:68 %    EDV :80 ml    ESV :26 ml    Stroke volume :54 ml    LV mass :122 gr  Medical History  Signatures   ------------------------------------------------------------------  Electronically signed by Maximo Mazariegos MD, [facility] (Interpreting  physician) on 08/20/2021 at 11:07  ------------------------------------------------------------------            Discharge Exam:  BP (!) 158/75   Pulse 86   Temp 98.2 °F (36.8 °C) (Temporal)   Resp 16   Ht 5' 5\" (1.651 m)   Wt 204 lb 5.9 oz (92.7 kg)   SpO2 95%   BMI 34.01 kg/m²   General appearance: alert, appears stated age and cooperative  Head: Normocephalic, without obvious abnormality, atraumatic  Lungs: clear to auscultation bilaterally  Heart: regular rate and rhythm, S1, S2 normal, no murmur, click, rub or gallop  Abdomen: soft, non-tender; bowel sounds normal; no masses,  no organomegaly  Extremities: extremities normal, atraumatic, no cyanosis or edema    Disposition: home    Discharge Medications:     Medication List      START taking these medications    atenolol 50 MG tablet  Commonly known as: Tenormin  Take 1 tablet by mouth daily  Notes to patient: Use:  Lowers blood pressure and heart rate, takes workload off heart  Side effects:  Tiredness, shortness of breath, trouble sleeping, impotence        CHANGE how you take these medications    losartan 100 MG tablet  Commonly known as: COZAAR  Take 1 tablet by mouth daily  What changed:   · how much to take  · when to take this  Notes to patient: You were taking this medication at home. Be sure to take the correct tablet for 100 mg once daily        CONTINUE taking these medications    acetaminophen 500 MG tablet  Commonly known as: TYLENOL  Take 1 tablet by mouth 4 times daily as needed for Pain     Aleve 220 MG tablet  Generic drug: naproxen sodium     glimepiride 4 MG tablet  Commonly known as: AMARYL     metFORMIN 500 MG tablet  Commonly known as: Glucophage  Take 1 tablet by mouth 2 times daily (with meals).            Where to Get Your Medications      These medications [facility] to [facility] [address] [facility]    Phone: [phone]   · atenolol 50 MG tablet         Allergies   Allergen Reactions    Bactrim [Sulfamethoxazole-Trimethoprim] Other (See Comments)     Yeast infection    Lisinopril Other (See Comments)     cough       Follow-up with PCP in 1-2 weeks time  Pt is asked to call PMD or return to ER if chest pain recurs    Total time spent on day of discharge including face-to-face visit, examination, documentation, counseling, preparation of discharge plans and followup, and discharge medicine reconciliation and presciptions is 32 minutes    Signed:  Chris Jerry MD  8/20/2021

## 2021-08-20 NOTE — ED PROVIDER NOTES
I independently performed a history and physical on Justine Kinney. All diagnostic, treatment, and disposition decisions were made by myself in conjunction with the advanced practice provider. Briefly, this is a 70 y.o. female here for chest discomfort. Described as aching in character. Ongoing for the past few weeks. Thinks it may be related to fall. No change in pain with movement of her arms. She recently has been having issues controlling her blood pressure. Thinks she took extra of her losartan by accident and was found to have significantly elevated blood pressures prompting her to come here. Her chest discomfort acutely worsened over the past 24 h. It is associated with shortness of breath when she exerts herself. It is improved with rest.  No nausea or vomiting. No fevers or cough. On exam,   General: Patient is in no acute distress  Skin: No cyanosis  HEENT: Moist mucous membranes  Heart: Regular rate, regular rhythm  Lung: No respiratory distress  Abdomen: Soft, nontender  Neuro: Moving all extremities, no facial droop, no slurred speech, answers questions appropriately        EKG  The Ekg interpreted by me in the absence of a cardiologist shows. Tachy sinus rhythm  No acute ST changes     No significant EKG changes compared to study in 6/21      Screenings     Heart Score for chest pain patients  History: Moderately Suspicious  ECG: Normal  Patient Age: > 65 years  *Risk factors for Atherosclerotic disease: Coronary Artery Disease, Obesity, Hypertension, Diabetes Mellitus, Hypercholesterolemia, Positive family History  Risk Factors: > 3 Risk factors or history of atherosclerotic disease*  Troponin: < 1X normal limit  Heart Score Total: 5      MDM  Briefly, this is a 70 y.o. female here for chest discomfort for the past few weeks worsening over the past 24 h as well as hypertension. Systolics over 405W here. Will treat with labetalol and nitroglycerin initially.   Hypertensive emergency is on differential as well as ACS. Does have history of coronary artery disease and no recent stress testing. Chest discomfort could be musculoskeletal.  No current tachycardia, tachypnea, hypoxemia, unilateral DVT symptoms indicate pulmonary embolism. Given 3-week chronicity of symptoms, lower suspicion for dissection. will admit to hospitalist service    Patient Referrals:  No follow-up provider specified. Discharge Medications:  New Prescriptions    No medications on file       FINAL IMPRESSION  1. Uncontrolled hypertension    2. Chest pain, unspecified type        Blood pressure (!) 213/99, pulse 98, temperature 97.1 °F (36.2 °C), temperature source Oral, resp. rate 16, height 5' 5\" (1.651 m), weight 200 lb (90.7 kg), SpO2 94 %, not currently breastfeeding. For further details of [facility] emergency department encounter, please see documentation by advanced practice provider, Alejandro Morales.         Jewel Yao MD  08/20/21 3778

## 2021-08-20 NOTE — H&P
History and Physical  Dr. Yevgeniy Batista  8/20/2021    PCP: Nita Richards MD    Cc:   Chief Complaint   Patient presents with    Hypertension     pt concerned she took double dose of BP meds but unsure. BP more elev than usual.        HPI:  Magdaleno Herrera is a 70 y.o. female who has a past medical history of Arthritis, Asthma, Coronary artery disease involving native coronary artery of native heart with angina pectoris ([address] Diabetes mellitus ([address] GERD (gastroesophageal reflux disease), Hypertension, Mitral valve prolapse, Mixed hyperlipidemia, and Rotator cuff tear. Patient presents with Hypertensive urgency. HPI     70 y.o. female with medical history of arthritis, asthma, CAD with stent, DM, GERD, hypertension, HLD, MVP, rotator cuff tear who presents the emergency department with complaints of chest pain with elevated blood pressure readings. Patient states that approximately 1430 today she took her losartan. She thought it was possibly 100 mg pill and she took 2 by accident. She was previously given losartan 50 mg pills and had to take 2 to equal her prescribed dose of 100 mg. She has noticed chest pain across her chest present for approximately the past 2 to 3 days. She did tell her PCP and he had started her on omeprazole, although this has not alleviated her symptoms. He denies any associated nausea, vomiting, diarrhea, leg swelling, headache, neck pain, back pain, light headed, leg swelling, rashes, fevers, dizzy, syncope, cough, wheezing, palpitations, or being anticoagulated. Former smoker, denies alcohol use or street drugs. Upon ED arrival her blood pressure was checked and was found to be elevated. Patient also complains of some chest pain across her chest for the past 3 days. She was seen by PCP and thought it was possibly GERD and started her on omeprazole. States she continues to have the symptoms despite taking the omeprazole. Heart score 5. Lab work and imaging was obtained.   CBC with MCH 35.4. CMP with glucose 239, creatinine less than 0.5. Troponin negative. BNP 87. CXR shows stable chronic changes and no acute abnormality seen. Patient was given aspirin and nitroglycerin without change in her blood pressure. She was given IV dose of labetalol . Chest pain resolves with lower BP. Problem list of hospitalization thus far: Active Hospital Problems    Diagnosis     Hypertensive urgency [I16.0]     Mixed hyperlipidemia [E78.2]     GERD (gastroesophageal reflux disease) [K21.9]     Type 2 diabetes mellitus without complication ([facility] [C96.6]          Review of Systems: (1 system for EPF, 2-9 for detailed, 10+ for comprehensive)  Review of Systems   Constitutional: Negative for chills and fever. HENT: Negative for ear discharge and ear pain. Eyes: Negative for photophobia and pain. Respiratory: Positive for chest tightness. Negative for shortness of breath. Cardiovascular: Positive for chest pain. Negative for leg swelling. Gastrointestinal: Negative for nausea and vomiting. Endocrine: Negative for polydipsia and polyphagia. Genitourinary: Negative for frequency and urgency. Musculoskeletal: Negative for joint swelling and neck pain. Allergic/Immunologic: Negative for food allergies. Neurological: Negative for dizziness and light-headedness. Hematological: Negative for adenopathy. Psychiatric/Behavioral: Negative for agitation and decreased concentration.            Past Medical History:   Past Medical History:   Diagnosis Date    Arthritis     Asthma     Coronary artery disease involving native coronary artery of native heart with angina pectoris ([facility] 2/10/2017    [facility]     GERD (gastroesophageal reflux disease) 1/30/2017    Hypertension     Mitral valve prolapse     Mixed hyperlipidemia 1/31/2018    Rotator cuff tear 10/23/2013       Past Surgical History:   Past Surgical History:   Procedure Laterality Date    CORONARY ANGIOPLASTY WITH STENT PLACEMENT      TONSILLECTOMY         Social History:   Social History     Tobacco History     Smoking Status  Former Smoker Quit date  7/27/2002    Smokeless Tobacco Use  Never Used          Alcohol History     Alcohol Use Status  No          Drug Use     Drug Use Status  No          Sexual Activity     Sexually Active  Yes Partners  Male                Fam History:   Family History   Problem Relation Age of Onset    Diabetes Mother     Kidney Disease Mother     Diabetes Father     Stroke Father     Heart Disease Father     Arthritis Other     Hypertension Other        PFSH: The above PMHx, PSHx, SocHx, FamHx has been reviewed by myself. (1 area for detailed, 2-3 for comprehensive)      Code Status: Full Code    Meds - following list of home medications fromelectronic chart has been reviewed by myself  Prior to Admission medications    Medication Sig Start Date End Date Taking? Authorizing Provider   glimepiride (AMARYL) 4 MG tablet Take 4 mg by mouth every morning (before breakfast)   Yes Historical Provider, MD   naproxen sodium (ALEVE) 220 MG tablet Take 220 mg by mouth 2 times daily (with meals)   Yes Historical Provider, MD   losartan (COZAAR) 100 MG tablet Take 1 tablet by mouth daily  Patient taking differently: Take 50 mg by mouth 2 times daily  1/24/16  Yes Justina Mann MD   metformin (GLUCOPHAGE) 500 MG tablet Take 1 tablet by mouth 2 times daily (with meals).  3/4/13  Yes Brittany Levin MD   acetaminophen (TYLENOL) 500 MG tablet Take 1 tablet by mouth 4 times daily as needed for Pain 12/31/18   Mary Ramsay MD         Allergies   Allergen Reactions    Bactrim [Sulfamethoxazole-Trimethoprim] Other (See Comments)     Yeast infection    Lisinopril Other (See Comments)     cough             EXAM: (2-7 system for EPF/Detailed, ?8 for Comprehensive)  BP (!) 156/83   Pulse 86   Temp 97.4 °F (36.3 °C) (Temporal)   Resp 16   Ht 5' 5\" (1.651 m)   Wt 204 lb 5.9 oz (92.7 kg)   SpO2 98%   BMI 34.01 kg/m²   Constitutional: vitals as above: alert, appears stated age and cooperative  Psychiatric: normal insight and judgment, oriented to person, place, time, and general circumstances  Head: Normocephalic, without obvious abnormality  Eyes:lids and lashes normal, conjunctivae and sclerae normal and pupils equal, round, reactive to light and accomodation  EMNT: external ears normal, lips normal  Neck: no adenopathy, supple, symmetrical, trachea midline and thyroid not enlarged, symmetric, no tenderness/mass/nodules   Respiratory: clear to auscultation and percussion bilaterally with normal respiratory effort  Cardiovascular: normal rate, regular rhythm, normal S1 and S2 and no carotid bruits  Gastrointestinal: soft, non-tender, non-distended, normal bowel sounds, no masses or organomegaly  Lymphatic:   Extremities: no edema, no clubbing  Skin:No rashes or nodules noted.   Neurologic:    LABS:  Labs Reviewed   CBC WITH AUTO DIFFERENTIAL - Abnormal; Notable for the following components:       Result Value    MCH 35.4 (*)     All other components within normal limits    Narrative:     Performed at:  [facility] [address], [place], [facility] [employer]   Phone [phone]   COMPREHENSIVE METABOLIC PANEL W/ REFLEX TO MG FOR LOW K - Abnormal; Notable for the following components:    Glucose 239 (*)     CREATININE <0.5 (*)     All other components within normal limits    Narrative:     Performed at:  [facility] [address], [place], [facility] [employer]   Phone [phone]   D-DIMER, QUANTITATIVE - Abnormal; Notable for the following components:    D-Dimer, Quant 232 (*)     All other components within normal limits    Narrative:     Performed at:  [facility] [address], [place], [facility] [employer]   Phone [phone]   TROPONIN    Narrative:     Performed at:  [facility]  [address],  [place], [address]   Phone [phone]    Narrative:     Performed at:  [facility]  [address], [address]   Phone [phone]   TROPONIN    Narrative:     Performed at:  [facility]  [address],  [place], [address]   Phone [phone]   TROPONIN    Narrative:     Performed at:  [facility]  [address], [address]   Phone [phone]         IMAGING:  Imaging results from the ER have been reviewed in the computerized chart. XR CHEST PORTABLE    Result Date: 8/19/2021  EXAMINATION: ONE XRAY VIEW OF THE CHEST 8/19/2021 10:23 pm COMPARISON: 12/29/2018 HISTORY: ORDERING SYSTEM PROVIDED HISTORY: chest pain TECHNOLOGIST PROVIDED HISTORY: Reason for exam:->chest pain Reason for Exam: chest pain Acuity: Acute Type of Exam: Initial FINDINGS: The heart is normal.  The pulmonary vessels are normal.  The lungs are mildly hyperinflated and emphysematous. No consolidation or effusion is seen. The bones are intact. Stable chronic changes with no acute abnormality seen. EKG: from ER interpreted by self, sinus tachy at 101. No acute st elevation seen. Comparison made to ekg in old chart dated 12/30/18, shows similar form but rate lower at [address]:    Principal Problem:    Hypertensive urgency -New Problem to me. Pt with SBP > 200. Having assoc chest pain. After getting iv labetalol, BP better, and CP resolved  Plan: Pt home BP meds reviewed and will be continued. IV Hydralazine ordered for control of extremely high blood pressures. Will need to add second agent most likely. Will monitor labs to assess Creat/K for possible complications of medications. Active Problems:    Type 2 diabetes mellitus without complication ([address] -Established problem. Stable.   Sugars ok  Plan: Patient placed on controlled carbohydrate diet. Fingerstick sugars to be checked to monitor for both hypoglycemia as well as hyperglycemia. Sliding scale insulin ordered. Glucagon and dextrose ordered for hypoglycemia. Patient will be continued on home medications. Hemoglobin a1c to be ordered to assess efficacy of therapy. GERD (gastroesophageal reflux disease) -Established problem. Stable. Does not appear to be reflux  Plan: cont ppi    Mixed hyperlipidemia  Plan: stay on statin          Diagnoses as listed above, designated as new or established and plan outlined for each. Data Reviewed:   (1) Lab tests were reviewed or ordered. (1) Radiology tests were reviewed or ordered. (1) Medical test (Echo, EKG, PFT/percy) were ordered. (1)History was obtained from someone other than patient -   (1) Old records  were reviewed - see HPI/MDM for pertinent details if review done. (2) Case wasdiscussed with another health care provider:  [facility] np  (2) Imaging was viewed by myself. (2) EKG  was viewed by myself. The patient isbeing placed in inpatient status with the expectation of requiring a hospital stay spanning at least two midnights for care and treatment of the problems noted in the problem list.  This determination is also based on thepatients comorbidities and past medical history, the severity and timing of the signs and symptoms upon presentation.         Electronically signed by: Blas Marie MD 8/20/2021

## 2021-09-04 ENCOUNTER — HOSPITAL ENCOUNTER (EMERGENCY)
Age: 71
Discharge: HOME | End: 2021-09-04
Attending: EMERGENCY MEDICINE
Payer: COMMERCIAL

## 2021-09-04 ENCOUNTER — APPOINTMENT (OUTPATIENT)
Dept: CT IMAGING | Age: 71
End: 2021-09-04
Payer: COMMERCIAL

## 2021-09-04 VITALS
RESPIRATION RATE: 18 BRPM | WEIGHT: 198 LBS | OXYGEN SATURATION: 96 % | DIASTOLIC BLOOD PRESSURE: 99 MMHG | HEART RATE: 104 BPM | SYSTOLIC BLOOD PRESSURE: 185 MMHG | BODY MASS INDEX: 32.95 KG/M2 | TEMPERATURE: 98.9 F

## 2021-09-04 DIAGNOSIS — R10.13 ABDOMINAL PAIN, EPIGASTRIC: Primary | ICD-10-CM

## 2021-09-04 LAB
ALBUMIN SERPL-MCNC: 4.4 G/DL (ref 3.4–5)
ALP BLD-CCNC: 89 U/L (ref 40–129)
ALT SERPL-CCNC: 19 U/L (ref 10–40)
ANION GAP SERPL CALCULATED.3IONS-SCNC: 16 MMOL/L (ref 3–16)
AST SERPL-CCNC: 26 U/L (ref 15–37)
BASOPHILS ABSOLUTE: 0 K/UL (ref 0–0.2)
BASOPHILS RELATIVE PERCENT: 0.3 %
BILIRUB SERPL-MCNC: 0.4 MG/DL (ref 0–1)
BILIRUBIN DIRECT: <0.2 MG/DL (ref 0–0.3)
BILIRUBIN, INDIRECT: NORMAL MG/DL (ref 0–1)
BUN BLDV-MCNC: 10 MG/DL (ref 7–20)
CALCIUM SERPL-MCNC: 9.5 MG/DL (ref 8.3–10.6)
CHLORIDE BLD-SCNC: 103 MMOL/L (ref 99–110)
CO2: 21 MMOL/L (ref 21–32)
CREAT SERPL-MCNC: 0.6 MG/DL (ref 0.6–1.2)
EOSINOPHILS ABSOLUTE: 0 K/UL (ref 0–0.6)
EOSINOPHILS RELATIVE PERCENT: 0.8 %
GFR AFRICAN AMERICAN: >60
GFR NON-AFRICAN AMERICAN: >60
GLUCOSE BLD-MCNC: 179 MG/DL (ref 70–99)
HCT VFR BLD CALC: 41.3 % (ref 36–48)
HEMOGLOBIN: 14.3 G/DL (ref 12–16)
LIPASE: 34 U/L (ref 13–60)
LYMPHOCYTES ABSOLUTE: 0.8 K/UL (ref 1–5.1)
LYMPHOCYTES RELATIVE PERCENT: 27 %
MCH RBC QN AUTO: 35 PG (ref 26–34)
MCHC RBC AUTO-ENTMCNC: 34.6 G/DL (ref 31–36)
MCV RBC AUTO: 101.3 FL (ref 80–100)
MONOCYTES ABSOLUTE: 0.4 K/UL (ref 0–1.3)
MONOCYTES RELATIVE PERCENT: 12.7 %
NEUTROPHILS ABSOLUTE: 1.7 K/UL (ref 1.7–7.7)
NEUTROPHILS RELATIVE PERCENT: 59.2 %
PDW BLD-RTO: 14.6 % (ref 12.4–15.4)
PLATELET # BLD: 123 K/UL (ref 135–450)
PMV BLD AUTO: 7.2 FL (ref 5–10.5)
POTASSIUM REFLEX MAGNESIUM: 4.2 MMOL/L (ref 3.5–5.1)
RBC # BLD: 4.07 M/UL (ref 4–5.2)
SODIUM BLD-SCNC: 140 MMOL/L (ref 136–145)
TOTAL PROTEIN: 6.9 G/DL (ref 6.4–8.2)
TROPONIN: <0.01 NG/ML
WBC # BLD: 2.9 K/UL (ref 4–11)

## 2021-09-04 PROCEDURE — 80048 BASIC METABOLIC PNL TOTAL CA: CPT

## 2021-09-04 PROCEDURE — 85025 COMPLETE CBC W/AUTO DIFF WBC: CPT

## 2021-09-04 PROCEDURE — 84484 ASSAY OF TROPONIN QUANT: CPT

## 2021-09-04 PROCEDURE — 80076 HEPATIC FUNCTION PANEL: CPT

## 2021-09-04 PROCEDURE — 99283 EMERGENCY DEPT VISIT LOW MDM: CPT

## 2021-09-04 PROCEDURE — 36415 COLL VENOUS BLD VENIPUNCTURE: CPT

## 2021-09-04 PROCEDURE — 93005 ELECTROCARDIOGRAM TRACING: CPT | Performed by: EMERGENCY MEDICINE

## 2021-09-04 PROCEDURE — 6370000000 HC RX 637 (ALT 250 FOR IP): Performed by: EMERGENCY MEDICINE

## 2021-09-04 PROCEDURE — 74176 CT ABD & PELVIS W/O CONTRAST: CPT

## 2021-09-04 PROCEDURE — 83690 ASSAY OF LIPASE: CPT

## 2021-09-04 RX ORDER — ONDANSETRON 4 MG/1
4 TABLET, FILM COATED ORAL DAILY PRN
Qty: 30 TABLET | Refills: 0 | Status: SHIPPED | OUTPATIENT
Start: 2021-09-04 | End: 2022-02-19

## 2021-09-04 RX ORDER — ONDANSETRON 4 MG/1
4 TABLET, ORALLY DISINTEGRATING ORAL ONCE
Status: COMPLETED | OUTPATIENT
Start: 2021-09-04 | End: 2021-09-04

## 2021-09-04 RX ORDER — FAMOTIDINE 20 MG/1
20 TABLET, FILM COATED ORAL ONCE
Status: COMPLETED | OUTPATIENT
Start: 2021-09-04 | End: 2021-09-04

## 2021-09-04 RX ADMIN — FAMOTIDINE 20 MG: 20 TABLET ORAL at 19:01

## 2021-09-04 RX ADMIN — ONDANSETRON 4 MG: 4 TABLET, ORALLY DISINTEGRATING ORAL at 19:01

## 2021-09-04 ASSESSMENT — PAIN SCALES - GENERAL: PAINLEVEL_OUTOF10: 0

## 2021-09-04 ASSESSMENT — PAIN DESCRIPTION - DESCRIPTORS: DESCRIPTORS: OTHER (COMMENT)

## 2021-09-04 ASSESSMENT — PAIN DESCRIPTION - PAIN TYPE: TYPE: ACUTE PAIN

## 2021-09-04 ASSESSMENT — PAIN DESCRIPTION - LOCATION: LOCATION: ABDOMEN

## 2021-09-05 LAB
EKG ATRIAL RATE: 92 BPM
EKG DIAGNOSIS: NORMAL
EKG P AXIS: 54 DEGREES
EKG P-R INTERVAL: 152 MS
EKG Q-T INTERVAL: 352 MS
EKG QRS DURATION: 74 MS
EKG QTC CALCULATION (BAZETT): 435 MS
EKG R AXIS: 38 DEGREES
EKG T AXIS: 13 DEGREES
EKG VENTRICULAR RATE: 92 BPM

## 2021-09-05 PROCEDURE — 93010 ELECTROCARDIOGRAM REPORT: CPT | Performed by: INTERNAL MEDICINE

## 2021-09-05 NOTE — ED PROVIDER NOTES
Emergency Department Encounter    Patient: Janusz Wright  MRN: [id number]  : 1950  Date of Evaluation: 2021  ED Provider:  Mickie Castaneda MD    Triage Chief Complaint:   Abdominal Pain (pt having abd pain, diarrhea. pt has been trying over the counter medications with no relief. no vomiting, just nausea. started about 4 days ago, right after leaving the hospital)    [place]:  Janusz Wright is a 70 y.o. female that presents ER for evaluation of epigastric abdominal pain status post recent hospitalization for chest pain with negative cardiac ruled out, she complained of persistent epigastric pain nausea she is non-Covid vaccinated afebrile complaining of myalgias epigastric pain and loose stools. No alleviating factors no relief with over-the-counter Isaac.   Positive nausea    ROS - see HPI, below listed is current ROS at time of my eval:  General:  No fevers, no chills, no weakness  Eyes:  no discharge  ENT:  No sore throat, no nasal congestion  Cardiovascular:  No chest pain, no palpitations  Respiratory:  No shortness of breath, no cough, no wheezing  Gastrointestinal:  + pain, + nausea, no vomiting, no diarrhea  Musculoskeletal:  No muscle pain, no joint pain  Skin:  No rash, no pruritis  Neurologic:  no headache  Genitourinary:  No dysuria, no hematuria  Endocrine:  No unexpected weight gain, no unexpected weight loss  Extremities:  no edema, no pain    Past Medical History:   Diagnosis Date    Arthritis     Asthma     Coronary artery disease involving native coronary artery of native heart with angina pectoris ([address] 2/10/2017    Diabetes mellitus (HCC)     GERD (gastroesophageal reflux disease) 2017    Hypertension     Mitral valve prolapse     Mixed hyperlipidemia 2018    Rotator cuff tear 10/23/2013     Past Surgical History:   Procedure Laterality Date    CORONARY ANGIOPLASTY WITH STENT PLACEMENT      TONSILLECTOMY       Family History   Problem Relation Age of Onset    Diabetes Mother     Kidney Disease Mother     Diabetes Father     Stroke Father     Heart Disease Father     Arthritis Other     Hypertension Other      Social History     Socioeconomic History    Marital status:      Spouse name: Not on file    Number of children: Not on file    Years of education: Not on file    Highest education level: Not on file   Occupational History    Not on file   Tobacco Use    Smoking status: Former Smoker     Quit date: 2002     Years since quittin.1    Smokeless tobacco: Never Used   Substance and Sexual Activity    Alcohol use: No    Drug use: No    Sexual activity: Yes     Partners: Male   Other Topics Concern    Not on file   Social History Narrative    Not on file     Social Determinants of Health     Financial Resource Strain:     Difficulty of Paying Living Expenses:    Food Insecurity:     Worried About Running Out of Food in the Last Year:     Ran Out of Food in the Last Year:    Transportation Needs:     Lack of Transportation (Medical):  Lack of Transportation (Non-Medical):    Physical Activity:     Days of Exercise per Week:     Minutes of Exercise per Session:    Stress:     Feeling of Stress :    Social Connections:     Frequency of Communication with Friends and Family:     Frequency of Social Gatherings with Friends and Family:     Attends Yazidism Services:     Active Member of Clubs or Organizations:     Attends Club or Organization Meetings:     Marital Status:    Intimate Partner Violence:     Fear of Current or Ex-Partner:     Emotionally Abused:     Physically Abused:     Sexually Abused:      No current facility-administered medications for this encounter.      Current Outpatient Medications   Medication Sig Dispense Refill    atenolol (TENORMIN) 50 MG tablet Take 1 tablet by mouth daily 30 tablet 3    acetaminophen (TYLENOL) 500 MG tablet Take 1 tablet by mouth 4 times daily as needed for Pain 60 tablet 0  glimepiride (AMARYL) 4 MG tablet Take 4 mg by mouth every morning (before breakfast)      naproxen sodium (ALEVE) 220 MG tablet Take 220 mg by mouth 2 times daily (with meals)      losartan (COZAAR) 100 MG tablet Take 1 tablet by mouth daily (Patient taking differently: Take 50 mg by mouth 2 times daily ) 30 tablet 3    metformin (GLUCOPHAGE) 500 MG tablet Take 1 tablet by mouth 2 times daily (with meals). 60 tablet 0     Allergies   Allergen Reactions    Bactrim [Sulfamethoxazole-Trimethoprim] Other (See Comments)     Yeast infection    Lisinopril Other (See Comments)     cough       Nursing Notes Reviewed    Physical Exam:  Triage VS:    ED Triage Vitals [09/04/21 1821]   Enc Vitals Group      BP (!) 185/99      Pulse 104      Resp 18      Temp 98.9 °F (37.2 °C)      Temp Source Oral      SpO2 96 %      Weight 198 lb (89.8 kg)      Height       Head Circumference       Peak Flow       Pain Score       Pain Loc       Pain Edu? Excl. in [address]? My pulse ox interpretation is - normal    General appearance:  No acute distress. Skin:  Warm. Dry. No rash. Neck:  Trachea midline. Heart:  Regular rate and rhythm, normal S1 & S2.    Perfusion:  intact  Respiratory:  Lungs clear to auscultation bilaterally. Respirations nonlabored. Abdominal: mild epigastric tenderness to palpation, no rebound guarding or rigidity, neg Jacobsen's sign, neg McBurney's point tenderness to palpation, normal bowel sounds, no masses, no organomegaly, no pulsatile masses  Back:  No CVA TTP  Extremity:    normal ROM   Neurological:  Alert and oriented times 3.       I have reviewed and interpreted all of the currently available lab results from this visit (if applicable):  Results for orders placed or performed during the hospital encounter of 09/04/21   CBC Auto Differential   Result Value Ref Range    WBC 2.9 (L) 4.0 - 11.0 K/uL    RBC 4.07 4.00 - 5.20 M/uL    Hemoglobin 14.3 12.0 - 16.0 g/dL    Hematocrit 41.3 36.0 - 48.0 %    .3 (H) 80.0 - 100.0 fL    MCH 35.0 (H) 26.0 - 34.0 pg    MCHC 34.6 31.0 - 36.0 g/dL    RDW 14.6 12.4 - 15.4 %    Platelets 814 (L) 084 - 450 K/uL    MPV 7.2 5.0 - 10.5 fL    Neutrophils % 59.2 %    Lymphocytes % 27.0 %    Monocytes % 12.7 %    Eosinophils % 0.8 %    Basophils % 0.3 %    Neutrophils Absolute 1.7 1.7 - 7.7 K/uL    Lymphocytes Absolute 0.8 (L) 1.0 - 5.1 K/uL    Monocytes Absolute 0.4 0.0 - 1.3 K/uL    Eosinophils Absolute 0.0 0.0 - 0.6 K/uL    Basophils Absolute 0.0 0.0 - 0.2 K/uL   Basic Metabolic Panel w/ Reflex to MG   Result Value Ref Range    Sodium 140 136 - 145 mmol/L    Potassium reflex Magnesium 4.2 3.5 - 5.1 mmol/L    Chloride 103 99 - 110 mmol/L    CO2 21 21 - 32 mmol/L    Anion Gap 16 3 - 16    Glucose 179 (H) 70 - 99 mg/dL    BUN 10 7 - 20 mg/dL    CREATININE 0.6 0.6 - 1.2 mg/dL    GFR Non-African American >60 >60    GFR African American >60 >60    Calcium 9.5 8.3 - 10.6 mg/dL   Troponin   Result Value Ref Range    Troponin <0.01 <0.01 ng/mL   Hepatic Function Panel   Result Value Ref Range    Total Protein 6.9 6.4 - 8.2 g/dL    Albumin 4.4 3.4 - 5.0 g/dL    Alkaline Phosphatase 89 40 - 129 U/L    ALT 19 10 - 40 U/L    AST 26 15 - 37 U/L    Total Bilirubin 0.4 0.0 - 1.0 mg/dL    Bilirubin, Direct <0.2 0.0 - 0.3 mg/dL    Bilirubin, Indirect see below 0.0 - 1.0 mg/dL   Lipase   Result Value Ref Range    Lipase 34.0 13.0 - 60.0 U/L   EKG 12 Lead   Result Value Ref Range    Ventricular Rate 92 BPM    Atrial Rate 92 BPM    P-R Interval 152 ms    QRS Duration 74 ms    Q-T Interval 352 ms    QTc Calculation (Bazett) 435 ms    P Axis 54 degrees    R Axis 38 degrees    T Axis 13 degrees    Diagnosis       Normal sinus rhythmNonspecific T wave abnormalityAbnormal ECG      Radiographs (if obtained):  Radiologist's Report Reviewed:  CT abdomen pelvis: Not complete due to patient elopement  EKG (if obtained): (All EKG's are interpreted by myself in the absence of a cardiologist)  Sinus rhythm, normal axis nonspecific ST segment changes    MDM:  Presented ER for evaluation of epigastric abdominal pain with a differential diagnosis concerning for possible COVID-19, cholelithiasis gastritis ulcer disease or pancreatitis. Patient's white count was low she had lymphopenia she refused COVID-19 testing. Cardiac enzymes are negative EKG was unremarkable, lipase and LFTs were normal.  Patient eloped prior to CT imaging she received Zofran in the emergency department and eloped prior to physician reevaluation and diagnostic imaging    Clinical Impression:  1. Abdominal pain, epigastric      Disposition referral (if applicable):  No follow-up provider specified. Disposition medications (if applicable):  New Prescriptions    No medications on file       Comment: Please note this report has been produced using speech recognition software and may contain errors related to that system including errors in grammar, punctuation, and spelling, as well as words and phrases that may be inappropriate. Efforts were made to edit the dictations.       Anna Johns MD  95/12/63 2009

## 2022-02-19 ENCOUNTER — HOSPITAL ENCOUNTER (EMERGENCY)
Age: 72
Discharge: HOME | End: 2022-02-19
Payer: COMMERCIAL

## 2022-02-19 VITALS
WEIGHT: 180.16 LBS | DIASTOLIC BLOOD PRESSURE: 88 MMHG | OXYGEN SATURATION: 97 % | BODY MASS INDEX: 29.98 KG/M2 | HEART RATE: 77 BPM | RESPIRATION RATE: 16 BRPM | SYSTOLIC BLOOD PRESSURE: 198 MMHG | TEMPERATURE: 98.2 F

## 2022-02-19 DIAGNOSIS — M25.511 RIGHT SHOULDER PAIN, UNSPECIFIED CHRONICITY: Primary | ICD-10-CM

## 2022-02-19 PROCEDURE — 6360000002 HC RX W HCPCS: Performed by: PHYSICIAN ASSISTANT

## 2022-02-19 PROCEDURE — 99283 EMERGENCY DEPT VISIT LOW MDM: CPT

## 2022-02-19 PROCEDURE — 6370000000 HC RX 637 (ALT 250 FOR IP): Performed by: PHYSICIAN ASSISTANT

## 2022-02-19 PROCEDURE — 96372 THER/PROPH/DIAG INJ SC/IM: CPT

## 2022-02-19 RX ORDER — ORPHENADRINE CITRATE 30 MG/ML
60 INJECTION INTRAMUSCULAR; INTRAVENOUS ONCE
Status: COMPLETED | OUTPATIENT
Start: 2022-02-19 | End: 2022-02-19

## 2022-02-19 RX ORDER — LIDOCAINE 4 G/G
1 PATCH TOPICAL ONCE
Status: DISCONTINUED | OUTPATIENT
Start: 2022-02-19 | End: 2022-02-19 | Stop reason: HOSPADM

## 2022-02-19 RX ORDER — HYDROCODONE BITARTRATE AND ACETAMINOPHEN 5; 325 MG/1; MG/1
2 TABLET ORAL ONCE
Status: COMPLETED | OUTPATIENT
Start: 2022-02-19 | End: 2022-02-19

## 2022-02-19 RX ORDER — LIDOCAINE 50 MG/G
1 PATCH TOPICAL DAILY
Qty: 10 PATCH | Refills: 0 | Status: SHIPPED | OUTPATIENT
Start: 2022-02-19 | End: 2022-03-01

## 2022-02-19 RX ORDER — ORPHENADRINE CITRATE 100 MG/1
100 TABLET, EXTENDED RELEASE ORAL 2 TIMES DAILY
Qty: 20 TABLET | Refills: 0 | Status: SHIPPED | OUTPATIENT
Start: 2022-02-19 | End: 2022-03-01

## 2022-02-19 RX ORDER — ONDANSETRON 4 MG/1
4 TABLET, ORALLY DISINTEGRATING ORAL ONCE
Status: DISCONTINUED | OUTPATIENT
Start: 2022-02-19 | End: 2022-02-19 | Stop reason: HOSPADM

## 2022-02-19 RX ADMIN — ORPHENADRINE CITRATE 60 MG: 30 INJECTION INTRAMUSCULAR; INTRAVENOUS at 21:26

## 2022-02-19 RX ADMIN — HYDROCODONE BITARTRATE AND ACETAMINOPHEN 2 TABLET: 5; 325 TABLET ORAL at 21:26

## 2022-02-19 ASSESSMENT — PAIN - FUNCTIONAL ASSESSMENT
PAIN_FUNCTIONAL_ASSESSMENT: 0-10
PAIN_FUNCTIONAL_ASSESSMENT: PREVENTS OR INTERFERES SOME ACTIVE ACTIVITIES AND ADLS

## 2022-02-19 ASSESSMENT — ENCOUNTER SYMPTOMS
VOMITING: 0
SHORTNESS OF BREATH: 0
NAUSEA: 0

## 2022-02-19 ASSESSMENT — PAIN SCALES - GENERAL
PAINLEVEL_OUTOF10: 0
PAINLEVEL_OUTOF10: 8
PAINLEVEL_OUTOF10: 7

## 2022-02-19 ASSESSMENT — PAIN DESCRIPTION - ONSET: ONSET: ON-GOING

## 2022-02-19 ASSESSMENT — PAIN DESCRIPTION - LOCATION: LOCATION: SHOULDER

## 2022-02-19 ASSESSMENT — PAIN DESCRIPTION - ORIENTATION: ORIENTATION: RIGHT

## 2022-02-19 ASSESSMENT — PAIN DESCRIPTION - FREQUENCY: FREQUENCY: CONTINUOUS

## 2022-02-19 ASSESSMENT — PAIN DESCRIPTION - DESCRIPTORS: DESCRIPTORS: DISCOMFORT

## 2022-02-19 ASSESSMENT — PAIN DESCRIPTION - PROGRESSION
CLINICAL_PROGRESSION: RESOLVED
CLINICAL_PROGRESSION: GRADUALLY IMPROVING

## 2022-02-19 ASSESSMENT — PAIN DESCRIPTION - PAIN TYPE: TYPE: ACUTE PAIN

## 2022-02-20 NOTE — ED PROVIDER NOTES
[facility]        Pt Name: Feliberto Lawrence  MRN: [id number]  [place] 1950  Date of evaluation: 2/19/2022  Provider: Stas Cardoso PA-C  PCP: Danya Johnson MD  Note Started: 9:21 PM EST       DEANNA. I have evaluated this patient. My supervising physician was available for consultation. CHIEF COMPLAINT       Chief Complaint   Patient presents with    Shoulder Pain     right shoulder pain x 3-4 days. Patient has an old rotator cuff injury. SHe reports that she has been doing some heavy cleaning over the last few days. HISTORY OF PRESENT ILLNESS   (Location, Timing/Onset, Context/Setting, Quality, Duration, Modifying Factors, Severity, Associated Signs and Symptoms)  Note limiting factors. Chief Complaint: Shoulder pain    Feliberto Lawrence is a 70 y.o. female who presents emergency department today for evaluation for right shoulder pain. The patient states that she has been experiencing pain to her right shoulder, she states that this is actually been ongoing for the past several days. The patient denies falling or injuring herself in any way. States that she did actually tear the rotator cuff in the shoulder several years ago, and she states that she has been doing a lot of cleaning, she states that the repetitive motion of her arm above her head that she feels has flared up her pain. Patient states that she is taking a Medrol Dosepak, she states she has also tried Flexeril, Aleve and Tylenol without much improvement. The patient is rating her discomfort as an 8/10, she otherwise denies any known alleviating or aggravating factors. She has no chest pain. No shortness of breath. No numbness, tilling or weakness. No fever chills. No nausea or vomiting. No other complaints. Nursing Notes were all reviewed and agreed with or any disagreements were addressed in the HPI.     REVIEW OF SYSTEMS    (2-9 systems for level 4, 10 or more for level 5)     Review of Systems   Constitutional: Negative for activity change, appetite change and fever. Respiratory: Negative for shortness of breath. Cardiovascular: Negative for chest pain. Gastrointestinal: Negative for nausea and vomiting. Musculoskeletal: Positive for arthralgias. Skin: Negative for wound. Neurological: Negative for weakness and numbness. Positives and Pertinent negatives as per HPI. Except as noted above in the ROS, all other systems were reviewed and negative. PAST MEDICAL HISTORY     Past Medical History:   Diagnosis Date    Arthritis     Asthma     Coronary artery disease involving native coronary artery of native heart with angina pectoris ([facility] [address] 2/10/2017    Diabetes mellitus ([facility] [address]     GERD (gastroesophageal reflux disease) 1/30/2017    Hypertension     Mitral valve prolapse     Mixed hyperlipidemia 1/31/2018    Rotator cuff tear 10/23/2013         SURGICAL HISTORY     Past Surgical History:   Procedure Laterality Date    CORONARY ANGIOPLASTY WITH STENT PLACEMENT      TONSILLECTOMY           CURRENTMEDICATIONS       Discharge Medication List as of 2/19/2022  9:41 PM      CONTINUE these medications which have NOT CHANGED    Details   atenolol (TENORMIN) 50 MG tablet Take 1 tablet by mouth daily, Disp-30 tablet, R-3Normal      acetaminophen (TYLENOL) 500 MG tablet Take 1 tablet by mouth 4 times daily as needed for Pain, Disp-60 tablet, R-0Print      glimepiride (AMARYL) 4 MG tablet Take 4 mg by mouth every morning (before breakfast)      naproxen sodium (ALEVE) 220 MG tablet Take 220 mg by mouth 2 times daily (with meals)      losartan (COZAAR) 100 MG tablet Take 1 tablet by mouth daily, Disp-30 tablet, R-3      metformin (GLUCOPHAGE) 500 MG tablet Take 1 tablet by mouth 2 times daily (with meals). , Disp-60 tablet, R-0               ALLERGIES     Bactrim [sulfamethoxazole-trimethoprim] and Lisinopril    FAMILYHISTORY       Family History Problem Relation Age of Onset    Diabetes Mother     Kidney Disease Mother     Diabetes Father     Stroke Father     Heart Disease Father     Arthritis Other     Hypertension Other           SOCIAL HISTORY       Social History     Tobacco Use    Smoking status: Former Smoker     Quit date: 2002     Years since quittin.5    Smokeless tobacco: Never Used   Substance Use Topics    Alcohol use: No    Drug use: No       SCREENINGS    [place] Coma Scale  Eye Opening: Spontaneous  Best Verbal Response: Oriented  Best Motor Response: Obeys commands  [place] Coma Scale Score: 15        PHYSICAL EXAM    (up to 7 for level 4, 8 or more for level 5)     ED Triage Vitals [22 2104]   BP Temp Temp Source Pulse Resp SpO2 Height Weight   (!) 198/88 98.2 °F (36.8 °C) Oral 77 16 97 % -- 180 lb 2.6 oz (81.7 kg)       Physical Exam  Vitals and nursing note reviewed. Constitutional:       Appearance: She is well-developed. She is not diaphoretic. HENT:      Head: Normocephalic and atraumatic. Right Ear: External ear normal.      Left Ear: External ear normal.      Nose: Nose normal.   Eyes:      General:         Right eye: No discharge. Left eye: No discharge. Neck:      Trachea: No tracheal deviation. Cardiovascular:      Pulses: Normal pulses. Pulmonary:      Effort: Pulmonary effort is normal. No respiratory distress. Musculoskeletal:         General: Normal range of motion. Cervical back: Normal range of motion and neck supple. Comments: There is tenderness to palpation noted to the right anterior shoulder. There is no overlying erythema, edema, ecchymosis or warmth. Radial pulses 2+. Normal sensation light touch. Neurovascularly intact. The patient has full passive range of motion although she does experience tenderness with external rotation, and extension of the right shoulder   Skin:     General: Skin is warm and dry.    Neurological:      Mental Status: She is alert and oriented to person, place, and time. Psychiatric:         Behavior: Behavior normal.         DIAGNOSTIC RESULTS   LABS:    Labs Reviewed - No data to display    When ordered only abnormal lab results are displayed. All other labs were within normal range or not returned as of this dictation. EKG: When ordered, EKG's are interpreted by the Emergency Department Physician in the absence of a cardiologist.  Please see their note for interpretation of EKG. RADIOLOGY:   Non-plain film images such as CT, Ultrasound and MRI are read by the radiologist. Plain radiographic images are visualized and preliminarily interpreted by the ED Provider with the below findings:        Interpretation per the Radiologist below, if available at the time of this note:    No orders to display     No results found. PROCEDURES   Unless otherwise noted below, none     Procedures    CRITICAL CARE TIME       CONSULTS:  None      EMERGENCY DEPARTMENT COURSE and DIFFERENTIAL DIAGNOSIS/MDM:   Vitals:    Vitals:    02/19/22 2104   BP: (!) 198/88   Pulse: 77   Resp: 16   Temp: 98.2 °F (36.8 °C)   TempSrc: Oral   SpO2: 97%   Weight: 180 lb 2.6 oz (81.7 kg)       Patient was given the following medications:  Medications   lidocaine 4 % external patch 1 patch (1 patch TransDERmal Patch Applied 2/19/22 2127)   ondansetron (ZOFRAN-ODT) disintegrating tablet 4 mg (4 mg Oral Not Given 2/19/22 2129)   orphenadrine (NORFLEX) injection 60 mg (60 mg IntraMUSCular Given 2/19/22 2126)   HYDROcodone-acetaminophen (NORCO) 5-325 MG per tablet 2 tablet (2 tablets Oral Given 2/19/22 2126)           Briefly, this is a 27-year-old female who presents to the emergency department today for right shoulder pain. Patient states that she has torn her rotator cuff in this arm in the past, and she states that over the past couple of days she has had increasing pain although she states that she has been cleaning. No fall or injury. No chest pain.     On examination she does have reproducible tenderness noted about the right shoulder and she is otherwise neurovascularly intact    As patient did not fall or injure herself I do not feel that she needs any emergent x-rays, as this would otherwise not . She will be referred to orthopedics. She will be given a one-time pain medication dose here, and will be given Norflex and Lidoderm patches for home. She will be referred to orthopedics for follow-up within the next week. She is to return to the ED for any new or worsening symptoms, the patient was understanding is agreeable plan. Stable for discharge. No results found for this visit on 02/19/22. I estimate there is LOW risk for COMPARTMENT SYNDROME, DEEP VENOUS THROMBOSIS, SEPTIC ARTHRITIS, TENDON OR NEUROVASCULAR INJURY, thus I consider the discharge disposition reasonable. [facility] and I have discussed the diagnosis and risks, and we agree with discharging home to follow-up with their primary doctor or the referral orthopedist. We also discussed returning to the Emergency Department immediately if new or worsening symptoms occur. We have discussed the symptoms which are most concerning (e.g., changing or worsening pain, numbness, weakness) that necessitate immediate return. Final Impression    1. Right shoulder pain, unspecified chronicity        Blood pressure (!) 198/88, pulse 77, temperature 98.2 °F (36.8 °C), temperature source Oral, resp. rate 16, weight 180 lb 2.6 oz (81.7 kg), SpO2 97 %, not currently breastfeeding. FINAL IMPRESSION      1.  Right shoulder pain, unspecified chronicity          DISPOSITION/PLAN   DISPOSITION Decision To Discharge 02/19/2022 09:45:57 PM      PATIENT REFERRED TO:  Matthew Chiu MD  [address]  [address] [place] [address]  [phone]    Schedule an appointment as soon as possible for a visit in 1 week      [facility] Emergency Department  [address] [facility] [address] [facility]  [phone]    As needed, If symptoms worsen      DISCHARGE MEDICATIONS:  Discharge Medication List as of 2/19/2022  9:41 PM      START taking these medications    Details   orphenadrine (NORFLEX) 100 MG extended release tablet Take 1 tablet by mouth 2 times daily for 10 days, Disp-20 tablet, R-0Normal      lidocaine (LIDODERM) 5 % Place 1 patch onto the skin daily for 10 days 12 hours on, 12 hours off., Disp-10 patch, R-0Normal             DISCONTINUED MEDICATIONS:  Discharge Medication List as of 2/19/2022  9:41 PM      STOP taking these medications       ondansetron (ZOFRAN) 4 MG tablet Comments:   Reason for Stopping:                      (Please note that portions of this note were completed with a voice recognition program.  Efforts were made to edit the dictations but occasionally words are mis-transcribed.)    Andrea Mendez PA-C (electronically signed)            Andrea Mendez PA-C  02/19/22 2302

## 2022-03-20 ENCOUNTER — HOSPITAL ENCOUNTER (EMERGENCY)
Age: 72
Discharge: HOME | End: 2022-03-20
Attending: EMERGENCY MEDICINE
Payer: COMMERCIAL

## 2022-03-20 ENCOUNTER — APPOINTMENT (OUTPATIENT)
Dept: GENERAL RADIOLOGY | Age: 72
End: 2022-03-20
Payer: COMMERCIAL

## 2022-03-20 ENCOUNTER — APPOINTMENT (OUTPATIENT)
Dept: CT IMAGING | Age: 72
End: 2022-03-20
Payer: COMMERCIAL

## 2022-03-20 VITALS
SYSTOLIC BLOOD PRESSURE: 151 MMHG | RESPIRATION RATE: 16 BRPM | DIASTOLIC BLOOD PRESSURE: 79 MMHG | OXYGEN SATURATION: 96 % | TEMPERATURE: 98.1 F | HEART RATE: 70 BPM

## 2022-03-20 DIAGNOSIS — R10.13 ABDOMINAL PAIN, EPIGASTRIC: Primary | ICD-10-CM

## 2022-03-20 LAB
A/G RATIO: 1.6 (ref 1.1–2.2)
ALBUMIN SERPL-MCNC: 2.4 G/DL (ref 3.4–5)
ALP BLD-CCNC: 47 U/L (ref 40–129)
ALT SERPL-CCNC: 9 U/L (ref 10–40)
ANION GAP SERPL CALCULATED.3IONS-SCNC: 11 MMOL/L (ref 3–16)
ANION GAP SERPL CALCULATED.3IONS-SCNC: 8 MMOL/L (ref 3–16)
AST SERPL-CCNC: 11 U/L (ref 15–37)
BASOPHILS ABSOLUTE: 0 K/UL (ref 0–0.2)
BASOPHILS ABSOLUTE: 0 K/UL (ref 0–0.2)
BASOPHILS RELATIVE PERCENT: 0.3 %
BASOPHILS RELATIVE PERCENT: 0.3 %
BILIRUB SERPL-MCNC: <0.2 MG/DL (ref 0–1)
BUN BLDV-MCNC: 12 MG/DL (ref 7–20)
BUN BLDV-MCNC: 18 MG/DL (ref 7–20)
CALCIUM SERPL-MCNC: 5.4 MG/DL (ref 8.3–10.6)
CALCIUM SERPL-MCNC: 9.6 MG/DL (ref 8.3–10.6)
CHLORIDE BLD-SCNC: 104 MMOL/L (ref 99–110)
CHLORIDE BLD-SCNC: 121 MMOL/L (ref 99–110)
CO2: 17 MMOL/L (ref 21–32)
CO2: 27 MMOL/L (ref 21–32)
CREAT SERPL-MCNC: 0.6 MG/DL (ref 0.6–1.2)
CREAT SERPL-MCNC: <0.5 MG/DL (ref 0.6–1.2)
EKG ATRIAL RATE: 73 BPM
EKG DIAGNOSIS: NORMAL
EKG P AXIS: 58 DEGREES
EKG P-R INTERVAL: 170 MS
EKG Q-T INTERVAL: 402 MS
EKG QRS DURATION: 82 MS
EKG QTC CALCULATION (BAZETT): 442 MS
EKG R AXIS: 45 DEGREES
EKG T AXIS: 78 DEGREES
EKG VENTRICULAR RATE: 73 BPM
EOSINOPHILS ABSOLUTE: 0.2 K/UL (ref 0–0.6)
EOSINOPHILS ABSOLUTE: 0.3 K/UL (ref 0–0.6)
EOSINOPHILS RELATIVE PERCENT: 4.4 %
EOSINOPHILS RELATIVE PERCENT: 5.1 %
GFR AFRICAN AMERICAN: >60
GFR AFRICAN AMERICAN: >60
GFR NON-AFRICAN AMERICAN: >60
GFR NON-AFRICAN AMERICAN: >60
GLUCOSE BLD-MCNC: 138 MG/DL (ref 70–99)
GLUCOSE BLD-MCNC: 96 MG/DL (ref 70–99)
HCT VFR BLD CALC: 34.6 % (ref 36–48)
HCT VFR BLD CALC: 37.7 % (ref 36–48)
HEMOGLOBIN: 11.8 G/DL (ref 12–16)
HEMOGLOBIN: 13.1 G/DL (ref 12–16)
LYMPHOCYTES ABSOLUTE: 1 K/UL (ref 1–5.1)
LYMPHOCYTES ABSOLUTE: 1.4 K/UL (ref 1–5.1)
LYMPHOCYTES RELATIVE PERCENT: 22.6 %
LYMPHOCYTES RELATIVE PERCENT: 26.8 %
MAGNESIUM: 1.1 MG/DL (ref 1.8–2.4)
MCH RBC QN AUTO: 36.8 PG (ref 26–34)
MCH RBC QN AUTO: 37.1 PG (ref 26–34)
MCHC RBC AUTO-ENTMCNC: 34.1 G/DL (ref 31–36)
MCHC RBC AUTO-ENTMCNC: 34.6 G/DL (ref 31–36)
MCV RBC AUTO: 107.3 FL (ref 80–100)
MCV RBC AUTO: 107.9 FL (ref 80–100)
MONOCYTES ABSOLUTE: 0.4 K/UL (ref 0–1.3)
MONOCYTES ABSOLUTE: 0.4 K/UL (ref 0–1.3)
MONOCYTES RELATIVE PERCENT: 7.7 %
MONOCYTES RELATIVE PERCENT: 8.4 %
NEUTROPHILS ABSOLUTE: 2.9 K/UL (ref 1.7–7.7)
NEUTROPHILS ABSOLUTE: 3.2 K/UL (ref 1.7–7.7)
NEUTROPHILS RELATIVE PERCENT: 60.1 %
NEUTROPHILS RELATIVE PERCENT: 64.3 %
PDW BLD-RTO: 13.9 % (ref 12.4–15.4)
PDW BLD-RTO: 14.3 % (ref 12.4–15.4)
PLATELET # BLD: 199 K/UL (ref 135–450)
PLATELET # BLD: 222 K/UL (ref 135–450)
PMV BLD AUTO: 6.8 FL (ref 5–10.5)
PMV BLD AUTO: 7 FL (ref 5–10.5)
POTASSIUM REFLEX MAGNESIUM: 2.5 MMOL/L (ref 3.5–5.1)
POTASSIUM REFLEX MAGNESIUM: 4.3 MMOL/L (ref 3.5–5.1)
PRO-BNP: 26 PG/ML (ref 0–124)
RBC # BLD: 3.21 M/UL (ref 4–5.2)
RBC # BLD: 3.52 M/UL (ref 4–5.2)
SODIUM BLD-SCNC: 142 MMOL/L (ref 136–145)
SODIUM BLD-SCNC: 146 MMOL/L (ref 136–145)
TOTAL PROTEIN: 3.9 G/DL (ref 6.4–8.2)
TROPONIN: <0.01 NG/ML
WBC # BLD: 4.5 K/UL (ref 4–11)
WBC # BLD: 5.3 K/UL (ref 4–11)

## 2022-03-20 PROCEDURE — 93005 ELECTROCARDIOGRAM TRACING: CPT | Performed by: EMERGENCY MEDICINE

## 2022-03-20 PROCEDURE — 71045 X-RAY EXAM CHEST 1 VIEW: CPT

## 2022-03-20 PROCEDURE — 85025 COMPLETE CBC W/AUTO DIFF WBC: CPT

## 2022-03-20 PROCEDURE — 84484 ASSAY OF TROPONIN QUANT: CPT

## 2022-03-20 PROCEDURE — 83735 ASSAY OF MAGNESIUM: CPT

## 2022-03-20 PROCEDURE — 6370000000 HC RX 637 (ALT 250 FOR IP): Performed by: EMERGENCY MEDICINE

## 2022-03-20 PROCEDURE — 99283 EMERGENCY DEPT VISIT LOW MDM: CPT

## 2022-03-20 PROCEDURE — 93010 ELECTROCARDIOGRAM REPORT: CPT | Performed by: INTERNAL MEDICINE

## 2022-03-20 PROCEDURE — 6360000004 HC RX CONTRAST MEDICATION: Performed by: EMERGENCY MEDICINE

## 2022-03-20 PROCEDURE — 80053 COMPREHEN METABOLIC PANEL: CPT

## 2022-03-20 PROCEDURE — 74177 CT ABD & PELVIS W/CONTRAST: CPT

## 2022-03-20 PROCEDURE — 36415 COLL VENOUS BLD VENIPUNCTURE: CPT

## 2022-03-20 PROCEDURE — 83880 ASSAY OF NATRIURETIC PEPTIDE: CPT

## 2022-03-20 RX ORDER — DICYCLOMINE HYDROCHLORIDE 10 MG/1
10 CAPSULE ORAL EVERY 6 HOURS PRN
Qty: 20 CAPSULE | Refills: 0 | Status: ON HOLD | OUTPATIENT
Start: 2022-03-20 | End: 2022-06-29

## 2022-03-20 RX ORDER — SUCRALFATE 1 G/1
1 TABLET ORAL ONCE
Status: COMPLETED | OUTPATIENT
Start: 2022-03-20 | End: 2022-03-20

## 2022-03-20 RX ADMIN — LIDOCAINE HYDROCHLORIDE: 20 SOLUTION OROPHARYNGEAL at 04:16

## 2022-03-20 RX ADMIN — SUCRALFATE 1 G: 1 TABLET ORAL at 04:16

## 2022-03-20 RX ADMIN — IOPAMIDOL 75 ML: 755 INJECTION, SOLUTION INTRAVENOUS at 05:08

## 2022-03-20 ASSESSMENT — ENCOUNTER SYMPTOMS
RHINORRHEA: 0
ABDOMINAL PAIN: 1
VOMITING: 0
NAUSEA: 0
COUGH: 0
BACK PAIN: 1
DIARRHEA: 0
SHORTNESS OF BREATH: 1

## 2022-03-20 ASSESSMENT — PAIN SCALES - GENERAL: PAINLEVEL_OUTOF10: 8

## 2022-03-20 NOTE — ED PROVIDER NOTES
[address] ENCOUNTER      Pt Name: Caridad Nguyen  MRN: [id number]  [place] 1950  Date of evaluation: 3/20/2022  Provider: Mario Boone MD    CHIEF COMPLAINT       Chief Complaint   Patient presents with    Chest Pain     Patient  woke up with 8/10 chest pain that started about 2am.  was recently at [facility] and was given meds that aren't working. HISTORY OF PRESENT ILLNESS   (Location/Symptom, Timing/Onset,Context/Setting, Quality, Duration, Modifying Factors, Severity)  Note limiting factors. Caridad Nguyen is a 67 y.o. female who presents to the emergency department for chest pain. Epigastric pain. Was seen at [facility] and given Pepcid and Omeprazole but they are not helping. Pain never completely goes away but it does improve then worsen. Overall pain has been for about a week. No nausea,          Nursing notes were reviewed. REVIEW OF SYSTEMS    (2-9 systems for level 4, 10 or more for level 5)     Review of Systems   Constitutional: Positive for unexpected weight change. Negative for fever. ~20lbs in about 3 weeks. HENT: Negative for rhinorrhea. Respiratory: Positive for shortness of breath. Negative for cough. Cardiovascular: Positive for chest pain. Gastrointestinal: Positive for abdominal pain. Negative for diarrhea, nausea and vomiting. Endocrine: Positive for polyuria. Genitourinary: Positive for frequency. Negative for dysuria. Musculoskeletal: Positive for back pain. Skin: Negative for rash. Neurological: Negative for headaches. Hematological: Bruises/bleeds easily.        PAST MEDICAL HISTORY     Past Medical History:   Diagnosis Date    Arthritis     Asthma     Coronary artery disease involving native coronary artery of native heart with angina pectoris ([facility] [address] 2/10/2017    Diabetes mellitus ([facility] [address]     GERD (gastroesophageal reflux disease) 1/30/2017    Hypertension     Mitral valve prolapse     Mixed hyperlipidemia 2018    Rotator cuff tear 10/23/2013       SURGICALHISTORY       Past Surgical History:   Procedure Laterality Date    CORONARY ANGIOPLASTY WITH STENT PLACEMENT      TONSILLECTOMY           CURRENT MEDICATIONS       Previous Medications    ACETAMINOPHEN (TYLENOL) 500 MG TABLET    Take 1 tablet by mouth 4 times daily as needed for Pain    ATENOLOL (TENORMIN) 50 MG TABLET    Take 1 tablet by mouth daily    GLIMEPIRIDE (AMARYL) 4 MG TABLET    Take 4 mg by mouth every morning (before breakfast)    LOSARTAN (COZAAR) 100 MG TABLET    Take 1 tablet by mouth daily    METFORMIN (GLUCOPHAGE) 500 MG TABLET    Take 1 tablet by mouth 2 times daily (with meals).     NAPROXEN SODIUM (ALEVE) 220 MG TABLET    Take 220 mg by mouth 2 times daily (with meals)       ALLERGIES     Bactrim [sulfamethoxazole-trimethoprim] and Lisinopril    FAMILY HISTORY       Family History   Problem Relation Age of Onset    Diabetes Mother     Kidney Disease Mother     Diabetes Father     Stroke Father     Heart Disease Father     Arthritis Other     Hypertension Other           SOCIAL HISTORY       Social History     Socioeconomic History    Marital status:      Spouse name: None    Number of children: None    Years of education: None    Highest education level: None   Occupational History    None   Tobacco Use    Smoking status: Former Smoker     Quit date: 2002     Years since quittin.6    Smokeless tobacco: Never Used   Substance and Sexual Activity    Alcohol use: No    Drug use: No    Sexual activity: Yes     Partners: Male   Other Topics Concern    None   Social History Narrative    None     Social Determinants of Health     Financial Resource Strain:     Difficulty of Paying Living Expenses: Not on file   Food Insecurity:     Worried About Running Out of Food in the Last Year: Not on file    Ian of Food in the Last Year: Not on file   Transportation Needs:  Lack of Transportation (Medical): Not on file    Lack of Transportation (Non-Medical): Not on file   Physical Activity:     Days of Exercise per Week: Not on file    Minutes of Exercise per Session: Not on file   Stress:     Feeling of Stress : Not on file   Social Connections:     Frequency of Communication with Friends and Family: Not on file    Frequency of Social Gatherings with Friends and Family: Not on file    Attends Nondenominational Services: Not on file    Active Member of [address] [place] [address] or Organizations: Not on file    Attends Club or Organization Meetings: Not on file    Marital Status: Not on file   Intimate Partner Violence:     Fear of Current or Ex-Partner: Not on file    Emotionally Abused: Not on file    Physically Abused: Not on file    Sexually Abused: Not on file   Housing Stability:     Unable to Pay for Housing in the Last Year: Not on file    Number of [place] in the Last Year: Not on file    Unstable Housing in the Last Year: Not on file       SCREENINGS    [place] Coma Scale  Eye Opening: Spontaneous  Best Verbal Response: Oriented  Best Motor Response: Obeys commands  [place] Coma Scale Score: 15        PHYSICAL EXAM    (up to 7 for level 4, 8 or more for level 5)     ED Triage Vitals [03/20/22 0306]   BP Temp Temp Source Pulse Resp SpO2 Height Weight   (!) 165/85 98.1 °F (36.7 °C) Oral 77 20 97 % -- --       Physical Exam  Vitals and nursing note reviewed. Constitutional:       Appearance: Normal appearance. She is well-developed. She is not ill-appearing. HENT:      Head: Normocephalic and atraumatic. Right Ear: External ear normal.      Left Ear: External ear normal.      Nose: Nose normal.   Eyes:      General: No scleral icterus. Right eye: No discharge. Left eye: No discharge. Conjunctiva/sclera: Conjunctivae normal.   Cardiovascular:      Rate and Rhythm: Normal rate and regular rhythm. Heart sounds: Normal heart sounds.    Pulmonary: Effort: Pulmonary effort is normal. No respiratory distress. Breath sounds: Normal breath sounds. No wheezing or rales. Abdominal:      General: Bowel sounds are normal. There is no distension. Palpations: Abdomen is soft. Tenderness: There is abdominal tenderness in the epigastric area. There is no guarding or rebound. Musculoskeletal:      Cervical back: Neck supple. Skin:     Coloration: Skin is not pale. Neurological:      Mental Status: She is alert. Psychiatric:         Mood and Affect: Mood normal.         Behavior: Behavior normal.             DIAGNOSTIC RESULTS     EKG: All EKG's are interpreted by the Emergency Department Physician who either signs or Co-signs this chart in the absence of a cardiologist.    12 lead EKG shows normal sinus rhythm at a rate of 73 bpm, NM interval QRS QT is normal.  Normal axis no acute ischemic changes    RADIOLOGY:   Non-plain film images such as CT, Ultrasound and MRI are read by the radiologist. Plain radiographic images are visualized and preliminarily interpreted by the emergency physician with the below findings:        Interpretation per the Radiologist below, if available at the time of this note:    CT ABDOMEN PELVIS W IV CONTRAST Additional Contrast? None   Final Result   No acute inflammatory process identified. XR CHEST PORTABLE   Final Result   No acute airspace disease identified.                ED BEDSIDE ULTRASOUND:   Performed by ED Physician - none    LABS:  Labs Reviewed   CBC WITH AUTO DIFFERENTIAL - Abnormal; Notable for the following components:       Result Value    RBC 3.21 (*)     Hemoglobin 11.8 (*)     Hematocrit 34.6 (*)     .9 (*)     MCH 36.8 (*)     All other components within normal limits   COMPREHENSIVE METABOLIC PANEL W/ REFLEX TO MG FOR LOW K - Abnormal; Notable for the following components:    Sodium 146 (*)     Potassium reflex Magnesium 2.5 (*)     Chloride 121 (*)     CO2 17 (*)     CREATININE <0.5 (*)     Calcium 5.4 (*)     Total Protein 3.9 (*)     Albumin 2.4 (*)     ALT 9 (*)     AST 11 (*)     All other components within normal limits    Narrative:     Lupe Rangel  [facility] tel. [phone],  Chemistry results called to and read back by ALEX Zurita, 03/20/2022  04:11, by Pedro Blair - Abnormal; Notable for the following components:    Magnesium 1.10 (*)     All other components within normal limits    Narrative:     Lupe Rangel  [facility] tel. [phone],  Chemistry results called to and read back by ALEX Zurita, 03/20/2022  04:11, by Angela Segundo   BASIC METABOLIC PANEL W/ REFLEX TO MG FOR LOW K - Abnormal; Notable for the following components:    Glucose 138 (*)     All other components within normal limits   CBC WITH AUTO DIFFERENTIAL - Abnormal; Notable for the following components:    RBC 3.52 (*)     .3 (*)     MCH 37.1 (*)     All other components within normal limits   TROPONIN    Narrative:     TAWANDA  [facility] tel. [phone],  Chemistry results called to and read back by ALEX Zurita, 03/20/2022  04:11, by Jannette Glass    Narrative:     Lupe Rangel  [facility] tel. [phone],  Chemistry results called to and read back by ALEX Zurita, 03/20/2022  04:11, by Angela Segundo       All other labs were within normal range or not returned as of this dictation. EMERGENCY DEPARTMENT COURSE and DIFFERENTIAL DIAGNOSIS/MDM:   Vitals:    Vitals:    03/20/22 0430 03/20/22 0457 03/20/22 0557 03/20/22 0641   BP: (!) 179/81  (!) 151/79    Pulse: 71 71 68 70   Resp: 20 19 14 16   Temp:       TempSrc:       SpO2: 97% 97% 95% 96%       Adult female who initially presents with chest pain however on exam she has epigastric pain and tenderness. Laboratory studies chest x-ray and EKG were ordered. The patient was seen a few days ago at another hospital I reviewed the work-up. At that time we did not do any abdominal imaging and a CT scan is ordered here.   The patient is given medications for her

## 2022-03-20 NOTE — ED NOTES
Discharge and education instructions reviewed. Patient verbalized understanding, teach-back successful. Patient denied questions at this time. No acute distress noted. Patient instructed to follow-up as noted - return to emergency department if symptoms worsen. Patient verbalized understanding. Discharged per EDMD with discharged instructions.      Jyotsna Dudley RN  03/20/22 0700

## 2022-06-26 ENCOUNTER — HOSPITAL ENCOUNTER (EMERGENCY)
Age: 72
Discharge: HOME | End: 2022-06-27
Payer: COMMERCIAL

## 2022-06-26 ENCOUNTER — APPOINTMENT (OUTPATIENT)
Dept: CT IMAGING | Age: 72
End: 2022-06-26
Payer: COMMERCIAL

## 2022-06-26 VITALS
SYSTOLIC BLOOD PRESSURE: 162 MMHG | TEMPERATURE: 98.4 F | RESPIRATION RATE: 20 BRPM | HEART RATE: 75 BPM | DIASTOLIC BLOOD PRESSURE: 59 MMHG | OXYGEN SATURATION: 99 %

## 2022-06-26 DIAGNOSIS — R10.13 ABDOMINAL PAIN, EPIGASTRIC: Primary | ICD-10-CM

## 2022-06-26 LAB
A/G RATIO: 2 (ref 1.1–2.2)
ALBUMIN SERPL-MCNC: 4.5 G/DL (ref 3.4–5)
ALP BLD-CCNC: 90 U/L (ref 40–129)
ALT SERPL-CCNC: 19 U/L (ref 10–40)
ANION GAP SERPL CALCULATED.3IONS-SCNC: 11 MMOL/L (ref 3–16)
AST SERPL-CCNC: 26 U/L (ref 15–37)
BASOPHILS ABSOLUTE: 0 K/UL (ref 0–0.2)
BASOPHILS RELATIVE PERCENT: 0.5 %
BILIRUB SERPL-MCNC: 0.4 MG/DL (ref 0–1)
BUN BLDV-MCNC: 15 MG/DL (ref 7–20)
CALCIUM SERPL-MCNC: 9.5 MG/DL (ref 8.3–10.6)
CHLORIDE BLD-SCNC: 105 MMOL/L (ref 99–110)
CO2: 25 MMOL/L (ref 21–32)
CREAT SERPL-MCNC: 0.6 MG/DL (ref 0.6–1.2)
EOSINOPHILS ABSOLUTE: 0.3 K/UL (ref 0–0.6)
EOSINOPHILS RELATIVE PERCENT: 3.9 %
GFR AFRICAN AMERICAN: >60
GFR NON-AFRICAN AMERICAN: >60
GLUCOSE BLD-MCNC: 139 MG/DL (ref 70–99)
HCT VFR BLD CALC: 41.1 % (ref 36–48)
HEMOGLOBIN: 14.1 G/DL (ref 12–16)
LACTIC ACID, SEPSIS: 1.5 MMOL/L (ref 0.4–1.9)
LIPASE: 37 U/L (ref 13–60)
LYMPHOCYTES ABSOLUTE: 2 K/UL (ref 1–5.1)
LYMPHOCYTES RELATIVE PERCENT: 30.1 %
MCH RBC QN AUTO: 35.9 PG (ref 26–34)
MCHC RBC AUTO-ENTMCNC: 34.3 G/DL (ref 31–36)
MCV RBC AUTO: 104.8 FL (ref 80–100)
MONOCYTES ABSOLUTE: 0.4 K/UL (ref 0–1.3)
MONOCYTES RELATIVE PERCENT: 6.9 %
NEUTROPHILS ABSOLUTE: 3.8 K/UL (ref 1.7–7.7)
NEUTROPHILS RELATIVE PERCENT: 58.6 %
PDW BLD-RTO: 14.4 % (ref 12.4–15.4)
PLATELET # BLD: 214 K/UL (ref 135–450)
PMV BLD AUTO: 6.9 FL (ref 5–10.5)
POTASSIUM SERPL-SCNC: 4.5 MMOL/L (ref 3.5–5.1)
PRO-BNP: 36 PG/ML (ref 0–124)
RBC # BLD: 3.92 M/UL (ref 4–5.2)
SODIUM BLD-SCNC: 141 MMOL/L (ref 136–145)
TOTAL PROTEIN: 6.7 G/DL (ref 6.4–8.2)
TROPONIN: <0.01 NG/ML
WBC # BLD: 6.5 K/UL (ref 4–11)

## 2022-06-26 PROCEDURE — 74176 CT ABD & PELVIS W/O CONTRAST: CPT

## 2022-06-26 PROCEDURE — 80053 COMPREHEN METABOLIC PANEL: CPT

## 2022-06-26 PROCEDURE — 93005 ELECTROCARDIOGRAM TRACING: CPT | Performed by: PHYSICIAN ASSISTANT

## 2022-06-26 PROCEDURE — 96374 THER/PROPH/DIAG INJ IV PUSH: CPT

## 2022-06-26 PROCEDURE — A4216 STERILE WATER/SALINE, 10 ML: HCPCS | Performed by: PHYSICIAN ASSISTANT

## 2022-06-26 PROCEDURE — 2500000003 HC RX 250 WO HCPCS: Performed by: PHYSICIAN ASSISTANT

## 2022-06-26 PROCEDURE — 36415 COLL VENOUS BLD VENIPUNCTURE: CPT

## 2022-06-26 PROCEDURE — 83690 ASSAY OF LIPASE: CPT

## 2022-06-26 PROCEDURE — 83880 ASSAY OF NATRIURETIC PEPTIDE: CPT

## 2022-06-26 PROCEDURE — 85025 COMPLETE CBC W/AUTO DIFF WBC: CPT

## 2022-06-26 PROCEDURE — 87086 URINE CULTURE/COLONY COUNT: CPT

## 2022-06-26 PROCEDURE — 83605 ASSAY OF LACTIC ACID: CPT

## 2022-06-26 PROCEDURE — 6370000000 HC RX 637 (ALT 250 FOR IP): Performed by: PHYSICIAN ASSISTANT

## 2022-06-26 PROCEDURE — 84484 ASSAY OF TROPONIN QUANT: CPT

## 2022-06-26 PROCEDURE — 99284 EMERGENCY DEPT VISIT MOD MDM: CPT

## 2022-06-26 PROCEDURE — 81001 URINALYSIS AUTO W/SCOPE: CPT

## 2022-06-26 PROCEDURE — 2580000003 HC RX 258: Performed by: PHYSICIAN ASSISTANT

## 2022-06-26 RX ORDER — LIDOCAINE HYDROCHLORIDE 20 MG/ML
10 SOLUTION OROPHARYNGEAL PRN
Qty: 50 ML | Refills: 0 | Status: SHIPPED | OUTPATIENT
Start: 2022-06-26

## 2022-06-26 RX ORDER — SUCRALFATE 1 G/1
1 TABLET ORAL ONCE
Status: COMPLETED | OUTPATIENT
Start: 2022-06-26 | End: 2022-06-26

## 2022-06-26 RX ORDER — LOSARTAN POTASSIUM 25 MG/1
100 TABLET ORAL ONCE
Status: COMPLETED | OUTPATIENT
Start: 2022-06-26 | End: 2022-06-26

## 2022-06-26 RX ORDER — HYDROCODONE BITARTRATE AND ACETAMINOPHEN 5; 325 MG/1; MG/1
1 TABLET ORAL ONCE
Status: COMPLETED | OUTPATIENT
Start: 2022-06-26 | End: 2022-06-26

## 2022-06-26 RX ORDER — SUCRALFATE ORAL 1 G/10ML
1 SUSPENSION ORAL 4 TIMES DAILY
Qty: 1200 ML | Refills: 0 | Status: SHIPPED | OUTPATIENT
Start: 2022-06-26

## 2022-06-26 RX ORDER — ACETAMINOPHEN 500 MG
1000 TABLET ORAL ONCE
Status: DISCONTINUED | OUTPATIENT
Start: 2022-06-26 | End: 2022-06-26

## 2022-06-26 RX ADMIN — SUCRALFATE 1 G: 1 TABLET ORAL at 23:39

## 2022-06-26 RX ADMIN — LIDOCAINE HYDROCHLORIDE: 20 SOLUTION ORAL; TOPICAL at 21:52

## 2022-06-26 RX ADMIN — HYDROCODONE BITARTRATE AND ACETAMINOPHEN 1 TABLET: 5; 325 TABLET ORAL at 23:39

## 2022-06-26 RX ADMIN — LOSARTAN POTASSIUM 100 MG: 25 TABLET, FILM COATED ORAL at 21:52

## 2022-06-26 RX ADMIN — FAMOTIDINE 20 MG: 10 INJECTION, SOLUTION INTRAVENOUS at 21:52

## 2022-06-26 ASSESSMENT — ENCOUNTER SYMPTOMS
VOMITING: 0
COUGH: 0
DIARRHEA: 0
RHINORRHEA: 0
NAUSEA: 1
SHORTNESS OF BREATH: 0
ABDOMINAL PAIN: 1

## 2022-06-26 ASSESSMENT — PAIN SCALES - GENERAL
PAINLEVEL_OUTOF10: 8
PAINLEVEL_OUTOF10: 9

## 2022-06-26 ASSESSMENT — PAIN - FUNCTIONAL ASSESSMENT: PAIN_FUNCTIONAL_ASSESSMENT: 0-10

## 2022-06-27 LAB
BACTERIA: ABNORMAL /HPF
BILIRUBIN URINE: NEGATIVE
BLOOD, URINE: NEGATIVE
CLARITY: CLEAR
COLOR: YELLOW
EKG ATRIAL RATE: 70 BPM
EKG DIAGNOSIS: NORMAL
EKG P AXIS: 52 DEGREES
EKG P-R INTERVAL: 172 MS
EKG Q-T INTERVAL: 404 MS
EKG QRS DURATION: 78 MS
EKG QTC CALCULATION (BAZETT): 436 MS
EKG R AXIS: 63 DEGREES
EKG T AXIS: 74 DEGREES
EKG VENTRICULAR RATE: 70 BPM
EPITHELIAL CELLS, UA: 2 /HPF (ref 0–5)
GLUCOSE URINE: NEGATIVE MG/DL
HYALINE CASTS: 0 /LPF (ref 0–8)
KETONES, URINE: NEGATIVE MG/DL
LEUKOCYTE ESTERASE, URINE: ABNORMAL
MICROSCOPIC EXAMINATION: YES
NITRITE, URINE: NEGATIVE
PH UA: 7.5 (ref 5–8)
PROTEIN UA: NEGATIVE MG/DL
RBC UA: 0 /HPF (ref 0–4)
SPECIFIC GRAVITY UA: 1.01 (ref 1–1.03)
URINE REFLEX TO CULTURE: YES
URINE TYPE: ABNORMAL
UROBILINOGEN, URINE: 0.2 E.U./DL
WBC UA: 13 /HPF (ref 0–5)

## 2022-06-27 PROCEDURE — 93010 ELECTROCARDIOGRAM REPORT: CPT | Performed by: INTERNAL MEDICINE

## 2022-06-27 NOTE — ED PROVIDER NOTES
[facility]        Pt Name: Jammie Gonzalez  MRN: [id number]  [place] 1950  Date of evaluation: 6/26/2022  Provider: Neto Goodson PA-C  PCP: Mary Dawkins MD  Note Started: 9:58 PM EDT       DEANNA. I have evaluated this patient. My supervising physician was available for consultation. CHIEF COMPLAINT       Chief Complaint   Patient presents with    Abdominal Pain     Patient endorses abd pain for a few days, got worse today. States nothing had made her pain go away. HISTORY OF PRESENT ILLNESS   (Location, Timing/Onset, Context/Setting, Quality, Duration, Modifying Factors, Severity, Associated Signs and Symptoms)  Note limiting factors. Chief Complaint: *Abdominal pain    Jammie Gonzalez is a 67 y.o. female who presents the emergency department today for evaluation for abdominal pain. The patient states that she has been experiencing pain to her epigastric abdomen she said that this has been constant for the past 3 to 4 days. She states that her pain has worsened today. She states that her pain is to her epigastric abdomen, and ovary will radiate to her right upper quadrant, otherwise does not radiate. Patient states that her pain is sharp, burning and she is currently rating her discomfort as a 9/10. Patient states that she does have a history of reflux, she states that she attempted to take her omeprazole, Protonix as well as her other medications without much improvement. Patient denies any chest pain, she has no shortness of breath. Patient states that she forgot to take her blood pressure medications today. Patient is reporting some intermittent nausea although is not any vomiting. No diarrhea. She has not had any fever or chills. She denies any cough or congestion she denies any urinary symptoms. Patient otherwise has no other complaints at this time.   No past surgical history to the abdomen    Nursing mg by mouth 2 times daily (with meals)      losartan (COZAAR) 100 MG tablet Take 1 tablet by mouth daily, Disp-30 tablet, R-3      metformin (GLUCOPHAGE) 500 MG tablet Take 1 tablet by mouth 2 times daily (with meals). , Disp-60 tablet, R-0               ALLERGIES     Bactrim [sulfamethoxazole-trimethoprim] and Lisinopril    FAMILYHISTORY       Family History   Problem Relation Age of Onset    Diabetes Mother     Kidney Disease Mother     Diabetes Father     Stroke Father     Heart Disease Father     Arthritis Other     Hypertension Other           SOCIAL HISTORY       Social History     Tobacco Use    Smoking status: Former Smoker     Quit date: 2002     Years since quittin.9    Smokeless tobacco: Never Used   Substance Use Topics    Alcohol use: No    Drug use: No       SCREENINGS    [place] Coma Scale  Eye Opening: Spontaneous  Best Verbal Response: Oriented  Best Motor Response: Obeys commands  G[place]winder Coma Scale Score: 15        PHYSICAL EXAM    (up to 7 for level 4, 8 or more for level 5)     ED Triage Vitals [22 2118]   BP Temp Temp Source Heart Rate Resp SpO2 Height Weight   (!) 217/110 98.4 °F (36.9 °C) Oral 75 20 100 % -- --       Physical Exam  Vitals and nursing note reviewed. Constitutional:       Appearance: She is well-developed. She is not diaphoretic. HENT:      Head: Normocephalic and atraumatic. Right Ear: External ear normal.      Left Ear: External ear normal.      Nose: Nose normal.   Eyes:      General:         Right eye: No discharge. Left eye: No discharge. Neck:      Trachea: No tracheal deviation. Cardiovascular:      Rate and Rhythm: Normal rate and regular rhythm. Heart sounds: No murmur heard. Pulmonary:      Effort: Pulmonary effort is normal. No respiratory distress. Breath sounds: Normal breath sounds. No wheezing or rales. Abdominal:      General: Bowel sounds are normal. There is no distension.       Palpations: Abdomen is soft. Tenderness: There is abdominal tenderness in the right upper quadrant and epigastric area. There is no guarding or rebound. Musculoskeletal:         General: Normal range of motion. Cervical back: Normal range of motion and neck supple. Skin:     General: Skin is warm and dry. Neurological:      Mental Status: She is alert and oriented to person, place, and time. Psychiatric:         Behavior: Behavior normal.         DIAGNOSTIC RESULTS   LABS:    Labs Reviewed   CBC WITH AUTO DIFFERENTIAL - Abnormal; Notable for the following components:       Result Value    RBC 3.92 (*)     .8 (*)     MCH 35.9 (*)     All other components within normal limits   COMPREHENSIVE METABOLIC PANEL - Abnormal; Notable for the following components:    Glucose 139 (*)     All other components within normal limits   URINALYSIS WITH REFLEX TO CULTURE - Abnormal; Notable for the following components:    Leukocyte Esterase, Urine MODERATE (*)     All other components within normal limits   MICROSCOPIC URINALYSIS - Abnormal; Notable for the following components:    WBC, UA 13 (*)     All other components within normal limits   CULTURE, URINE   LIPASE   TROPONIN   BRAIN NATRIURETIC PEPTIDE   LACTATE, SEPSIS   LACTATE, SEPSIS       When ordered only abnormal lab results are displayed. All other labs were within normal range or not returned as of this dictation. EKG: When ordered, EKG's are interpreted by the Emergency Department Physician in the absence of a cardiologist.  Please see their note for interpretation of EKG.     RADIOLOGY:   Non-plain film images such as CT, Ultrasound and MRI are read by the radiologist. Plain radiographic images are visualized and preliminarily interpreted by the ED Provider with the below findings:        Interpretation per the Radiologist below, if available at the time of this note:    CT ABDOMEN PELVIS WO CONTRAST Additional Contrast? None   Final Result   No acute intra-abdominal abnormality seen. Mild splenomegaly which is unchanged. No hydronephrosis or renal stones and no urinary obstruction. Scattered diverticula along the proximal sigmoid colon with no pericolonic   inflammation. Questionable 1.7 cm and left adrenal adenoma which is unchanged. Atrophic uterus with no pelvic mass. No results found. PROCEDURES   Unless otherwise noted below, none     Procedures    CRITICAL CARE TIME       CONSULTS:  None      EMERGENCY DEPARTMENT COURSE and DIFFERENTIAL DIAGNOSIS/MDM:   Vitals:    Vitals:    06/26/22 2118 06/26/22 2258 06/26/22 2328   BP: (!) 217/110 (!) 183/61 (!) 162/59   Pulse: 75     Resp: 20     Temp: 98.4 °F (36.9 °C)     TempSrc: Oral     SpO2: 100% 97% 99%       Patient was given the following medications:  Medications   famotidine (PEPCID) 20 mg in sodium chloride (PF) 10 mL injection (20 mg IntraVENous Given 6/26/22 2152)   aluminum & magnesium hydroxide-simethicone (MAALOX) 30 mL, lidocaine viscous hcl (XYLOCAINE) 5 mL (GI COCKTAIL) ( Oral Given 6/26/22 2152)   losartan (COZAAR) tablet 100 mg (100 mg Oral Given 6/26/22 2152)   sucralfate (CARAFATE) tablet 1 g (1 g Oral Given 6/26/22 2339)   HYDROcodone-acetaminophen (NORCO) 5-325 MG per tablet 1 tablet (1 tablet Oral Given 6/26/22 2339)         Is this patient to be included in the SEP-1 Core Measure due to severe sepsis or septic shock? No   Exclusion criteria - the patient is NOT to be included for SEP-1 Core Measure due to: Infection is not suspected    Briefly, this is a 80-year-old female who presents to the emergency department today for evaluation of abdominal pain. Patient has been experiencing pain to her upper abdomen for the past 3 to 4 days, constant, worse today. History of reflux. On examination, she does have reproducible tenderness noted to her epigastric abdomen and right upper quadrant. No rebound or guarding.     Urine does show moderate leukocytes and 13 white blood cells however she has no urinary symptoms, will await culture before treatment. EKG will be documented by my attending, please see his note for further details    CBC shows no evidence of leukocytosis or anemia. CMP is unremarkable. Lipase is normal.  Troponin is negative. CT shows no acute process. Patient was given a GI cocktail, as well as Pepcid, Carafate in the ED. She will be given a one-time dose of Norco.  I did have a long discussion with the patient that she is at risk for developing an ulcer with her continued reflux, she tells me that she has Protonix and omeprazole at home however she does not take this on a daily basis. I did stress the importance of taking her Protonix daily. She voices that she does understand this. Patient symptoms today could certainly be due to underlying peptic ulcer disease, she has a normal lactic acid, negative CT, she has had constant pain for several days and I do not suspect cardiac etiology, I feel that she can be managed as an outpatient as she is tolerating p.o. intake and is overall feeling better. She is followed by our GI group here, and has an appointment to have an EGD on 7/18/2022, I recommended that she call tomorrow to see if they can move this up, or for any other recommendations. She is to return to the ED for any new or worsening symptoms, patient voiced understanding and is agreeable with plan. She is stable for discharge.    Results for orders placed or performed during the hospital encounter of 06/26/22   CBC with Auto Differential   Result Value Ref Range    WBC 6.5 4.0 - 11.0 K/uL    RBC 3.92 (L) 4.00 - 5.20 M/uL    Hemoglobin 14.1 12.0 - 16.0 g/dL    Hematocrit 41.1 36.0 - 48.0 %    .8 (H) 80.0 - 100.0 fL    MCH 35.9 (H) 26.0 - 34.0 pg    MCHC 34.3 31.0 - 36.0 g/dL    RDW 14.4 12.4 - 15.4 %    Platelets 145 006 - 613 K/uL    MPV 6.9 5.0 - 10.5 fL    Neutrophils % 58.6 %    Lymphocytes % 30.1 % Monocytes % 6.9 %    Eosinophils % 3.9 %    Basophils % 0.5 %    Neutrophils Absolute 3.8 1.7 - 7.7 K/uL    Lymphocytes Absolute 2.0 1.0 - 5.1 K/uL    Monocytes Absolute 0.4 0.0 - 1.3 K/uL    Eosinophils Absolute 0.3 0.0 - 0.6 K/uL    Basophils Absolute 0.0 0.0 - 0.2 K/uL   Comprehensive Metabolic Panel   Result Value Ref Range    Sodium 141 136 - 145 mmol/L    Potassium 4.5 3.5 - 5.1 mmol/L    Chloride 105 99 - 110 mmol/L    CO2 25 21 - 32 mmol/L    Anion Gap 11 3 - 16    Glucose 139 (H) 70 - 99 mg/dL    BUN 15 7 - 20 mg/dL    CREATININE 0.6 0.6 - 1.2 mg/dL    GFR Non-African American >60 >60    GFR African American >60 >60    Calcium 9.5 8.3 - 10.6 mg/dL    Total Protein 6.7 6.4 - 8.2 g/dL    Albumin 4.5 3.4 - 5.0 g/dL    Albumin/Globulin Ratio 2.0 1.1 - 2.2    Total Bilirubin 0.4 0.0 - 1.0 mg/dL    Alkaline Phosphatase 90 40 - 129 U/L    ALT 19 10 - 40 U/L    AST 26 15 - 37 U/L   Lipase   Result Value Ref Range    Lipase 37.0 13.0 - 60.0 U/L   Urinalysis with Reflex to Culture    Specimen: Urine   Result Value Ref Range    Color, UA Yellow Straw/Yellow    Clarity, UA Clear Clear    Glucose, Ur Negative Negative mg/dL    Bilirubin Urine Negative Negative    Ketones, Urine Negative Negative mg/dL    Specific Gravity, UA 1.010 1.005 - 1.030    Blood, Urine Negative Negative    pH, UA 7.5 5.0 - 8.0    Protein, UA Negative Negative mg/dL    Urobilinogen, Urine 0.2 <2.0 E.U./dL    Nitrite, Urine Negative Negative    Leukocyte Esterase, Urine MODERATE (A) Negative    Microscopic Examination YES     Urine Type Voided     Urine Reflex to Culture Yes    Troponin   Result Value Ref Range    Troponin <0.01 <0.01 ng/mL   Brain Natriuretic Peptide   Result Value Ref Range    Pro-BNP 36 0 - 124 pg/mL   Lactate, Sepsis   Result Value Ref Range    Lactic Acid, Sepsis 1.5 0.4 - 1.9 mmol/L   Microscopic Urinalysis   Result Value Ref Range    Bacteria, UA None Seen None Seen /HPF    Hyaline Casts, UA 0 0 - 8 /LPF    WBC, UA 13 (H) 0 - 5 /HPF    RBC, UA 0 0 - 4 /HPF    Epithelial Cells, UA 2 0 - 5 /HPF   EKG 12 Lead   Result Value Ref Range    Ventricular Rate 70 BPM    Atrial Rate 70 BPM    P-R Interval 172 ms    QRS Duration 78 ms    Q-T Interval 404 ms    QTc Calculation (Bazett) 436 ms    P Axis 52 degrees    R Axis 63 degrees    T Axis 74 degrees    Diagnosis Normal sinus rhythmNormal ECG          I estimate there is LOW risk acute appendicitis, acute cholecystitis, cholangitis, pancreatitis, diverticulitis, perforated viscus, perforated ulcer, colitis, C. diff colitis, ischemic colitis, bowel obstruction, acute dissection, thus I consider the discharge disposition reasonable. Also, there is no evidence or peritonitis, sepsis, or toxicity. [facility] and I have discussed the diagnosis and risks, and we agree with discharging home to follow-up with their primary doctor. We also discussed returning to the Emergency Department immediately if new or worsening symptoms occur. We have discussed the symptoms which are most concerning (e.g., bloody stool, fever, changing or worsening pain, vomiting) that necessitate immediate return. FINAL IMPRESSION      1.  Abdominal pain, epigastric          DISPOSITION/PLAN   DISPOSITION Decision To Discharge 06/27/2022 12:41:07 AM      PATIENT REFERRED TO:  Talbert Sever, [address] [address]45    Schedule an appointment as soon as possible for a visit in 2 days      [address] [address]  [phone]    Schedule an appointment as soon as possible for a visit in 3 days      [facility] Emergency Department  [address] [facility]  [phone]    As needed, If symptoms worsen      DISCHARGE MEDICATIONS:  Discharge Medication List as of 6/27/2022 12:12 AM      START taking these medications    Details   sucralfate (CARAFATE) 1 GM/10ML suspension Take 10 mLs by mouth 4 times daily, Disp-1200 mL, R-0Normal      lidocaine viscous hcl (XYLOCAINE) 2 % SOLN solution Take 10 mLs by mouth as needed for Irritation, Disp-50 mL, R-0Normal             DISCONTINUED MEDICATIONS:  Discharge Medication List as of 6/27/2022 12:12 AM                 (Please note that portions of this note were completed with a voice recognition program.  Efforts were made to edit the dictations but occasionally words are mis-transcribed.)    Brandi Montemayor PA-C (electronically signed)           Brandi Montemayor PA-C  06/27/22 0109

## 2022-06-27 NOTE — ED PROVIDER NOTES
I did not see this patient personally. I only interpreted their EKG.   Reveals:  Normal sinus rhythm  No ST changes  Heart rate 70    No ischemic changes compared to January 2022 study     Tamar Edge MD  06/27/22 4812

## 2022-06-28 LAB — URINE CULTURE, ROUTINE: NORMAL

## 2022-06-28 NOTE — PROGRESS NOTES
Patient ___ reached   __x___not reached-preop instructions left on voice vtez__[phone]___________      DATE_6/29/2022_______ TIME__0830______ARRIVAL___0700______      Nothing to eat or drink after midnight the night before,except for what the prep instructions call for. If you do not have the instructions or do not understand them please contact your doctors office. Follow any instructions your doctors office has given you including what medications to take the AM of your procedure and which ones to hold. You may use your inhalers. If you take a long acting insulin the yasmeen prior please cut the dose in half and take no diabetic medications that AM.Follow specific doctors office instructions regarding blood thinners and if they want you to hold and for how long. If you are on a blood thinner and have no instructions please contact the office and ask. Dress comfortably,bring your insurance card,picture ID,and a complete list of medications, including supplements. You must have a responsible adult to stay with you during the procedure,drive you home and stay with you. [facility] phone number [phone] for any questions. OTHER INSTRUCTIONS(if applicable)_________________________________________________________        VISITOR POLICY(subject to change)    Current visitor policy is 2 visitors per patient. No children allowed. Mask are required. Visiting hours are 8a-8p. Overnight visitors will be at the discretion of the nurse.

## 2022-06-29 ENCOUNTER — HOSPITAL ENCOUNTER (OUTPATIENT)
Age: 72
Setting detail: OUTPATIENT SURGERY
Discharge: HOME | End: 2022-06-29
Attending: INTERNAL MEDICINE | Admitting: INTERNAL MEDICINE
Payer: COMMERCIAL

## 2022-06-29 ENCOUNTER — ANESTHESIA (OUTPATIENT)
Dept: ENDOSCOPY | Age: 72
End: 2022-06-29
Payer: COMMERCIAL

## 2022-06-29 ENCOUNTER — ANESTHESIA EVENT (OUTPATIENT)
Dept: ENDOSCOPY | Age: 72
End: 2022-06-29
Payer: COMMERCIAL

## 2022-06-29 VITALS
TEMPERATURE: 96.9 F | SYSTOLIC BLOOD PRESSURE: 155 MMHG | HEART RATE: 78 BPM | HEIGHT: 65 IN | WEIGHT: 186 LBS | RESPIRATION RATE: 16 BRPM | BODY MASS INDEX: 30.99 KG/M2 | OXYGEN SATURATION: 99 % | DIASTOLIC BLOOD PRESSURE: 77 MMHG

## 2022-06-29 DIAGNOSIS — R10.13 EPIGASTRIC PAIN: ICD-10-CM

## 2022-06-29 LAB
GLUCOSE BLD-MCNC: 130 MG/DL (ref 70–99)
GLUCOSE BLD-MCNC: 154 MG/DL (ref 70–99)
PERFORMED ON: ABNORMAL
PERFORMED ON: ABNORMAL

## 2022-06-29 PROCEDURE — 6360000002 HC RX W HCPCS: Performed by: NURSE ANESTHETIST, CERTIFIED REGISTERED

## 2022-06-29 PROCEDURE — 2709999900 HC NON-CHARGEABLE SUPPLY: Performed by: INTERNAL MEDICINE

## 2022-06-29 PROCEDURE — 2500000003 HC RX 250 WO HCPCS: Performed by: NURSE ANESTHETIST, CERTIFIED REGISTERED

## 2022-06-29 PROCEDURE — 2580000003 HC RX 258: Performed by: ANESTHESIOLOGY

## 2022-06-29 PROCEDURE — 3609012400 HC EGD TRANSORAL BIOPSY SINGLE/MULTIPLE: Performed by: INTERNAL MEDICINE

## 2022-06-29 PROCEDURE — 7100000010 HC PHASE II RECOVERY - FIRST 15 MIN: Performed by: INTERNAL MEDICINE

## 2022-06-29 PROCEDURE — 7100000011 HC PHASE II RECOVERY - ADDTL 15 MIN: Performed by: INTERNAL MEDICINE

## 2022-06-29 PROCEDURE — 3700000000 HC ANESTHESIA ATTENDED CARE: Performed by: INTERNAL MEDICINE

## 2022-06-29 RX ORDER — PANTOPRAZOLE SODIUM 40 MG/10ML
40 INJECTION, POWDER, LYOPHILIZED, FOR SOLUTION INTRAVENOUS DAILY
COMMUNITY

## 2022-06-29 RX ORDER — GLYCOPYRROLATE 0.2 MG/ML
INJECTION INTRAMUSCULAR; INTRAVENOUS PRN
Status: DISCONTINUED | OUTPATIENT
Start: 2022-06-29 | End: 2022-06-29 | Stop reason: SDUPTHER

## 2022-06-29 RX ORDER — PROPOFOL 10 MG/ML
INJECTION, EMULSION INTRAVENOUS PRN
Status: DISCONTINUED | OUTPATIENT
Start: 2022-06-29 | End: 2022-06-29 | Stop reason: SDUPTHER

## 2022-06-29 RX ORDER — SODIUM CHLORIDE 9 MG/ML
INJECTION, SOLUTION INTRAVENOUS CONTINUOUS
Status: DISCONTINUED | OUTPATIENT
Start: 2022-06-29 | End: 2022-06-29 | Stop reason: HOSPADM

## 2022-06-29 RX ADMIN — PROPOFOL 100 MG: 10 INJECTION, EMULSION INTRAVENOUS at 08:36

## 2022-06-29 RX ADMIN — PROPOFOL 50 MG: 10 INJECTION, EMULSION INTRAVENOUS at 08:40

## 2022-06-29 RX ADMIN — GLYCOPYRROLATE 0.2 MG: 0.2 INJECTION, SOLUTION INTRAMUSCULAR; INTRAVENOUS at 08:36

## 2022-06-29 RX ADMIN — SODIUM CHLORIDE: 9 INJECTION, SOLUTION INTRAVENOUS at 08:14

## 2022-06-29 ASSESSMENT — ENCOUNTER SYMPTOMS: SHORTNESS OF BREATH: 0

## 2022-06-29 ASSESSMENT — PAIN DESCRIPTION - DESCRIPTORS: DESCRIPTORS: STABBING;ACHING

## 2022-06-29 NOTE — ANESTHESIA POSTPROCEDURE EVALUATION
Department of Anesthesiology  Postprocedure Note    Patient: Traci Meraz  MRN: [id number]  YOB: 1950  Date of evaluation: 6/29/2022      Procedure Summary     Date: 06/29/22 Room / Location: [address] [place] [address]    Anesthesia Start: 9335 Anesthesia Stop: 2135    Procedure: EGD BIOPSY (N/A Abdomen) Diagnosis:       Epigastric pain      (Epigastric pain [R10.13])    Surgeons: Pavan Posada MD Responsible Provider: Nancy Vasquez MD    Anesthesia Type: MAC ASA Status: 3          Anesthesia Type: No value filed. Carey Phase I: Carey Score: 10    Carey Phase II:        Anesthesia Post Evaluation    Patient location during evaluation: PACU  Level of consciousness: awake  Airway patency: patent  Complications: no  Cardiovascular status: hemodynamically stable  Respiratory status: acceptable  There was medical reason for not using a multimodal analgesia pain management approach.

## 2022-06-29 NOTE — ANESTHESIA PRE PROCEDURE
Department of Anesthesiology  Preprocedure Note       Name:  Eladia Hurley   Age:  67 y.o.  :  1950                                          MRN:  [id number]         Date:  2022      Surgeon: Roxana Ayala):  Aure Ramirez MD    Procedure: Procedure(s):  EGD DIAGNOSTIC ONLY    Medications prior to admission:   Prior to Admission medications    Medication Sig Start Date End Date Taking? Authorizing Provider   pantoprazole (PROTONIX) 40 MG injection Infuse 40 mg intravenously daily   Yes Historical Provider, MD   sucralfate (CARAFATE) 1 GM/10ML suspension Take 10 mLs by mouth 4 times daily 22   Alba JAZMINE Tang   lidocaine viscous hcl (XYLOCAINE) 2 % SOLN solution Take 10 mLs by mouth as needed for Irritation 22   Alba JAZMINE Tang   acetaminophen (TYLENOL) 500 MG tablet Take 1 tablet by mouth 4 times daily as needed for Pain 18   Flaco Joyner MD   glimepiride (AMARYL) 4 MG tablet Take 4 mg by mouth every morning (before breakfast)    Historical Provider, MD   naproxen sodium (ALEVE) 220 MG tablet Take 220 mg by mouth 2 times daily (with meals)    Historical Provider, MD   losartan (COZAAR) 100 MG tablet Take 1 tablet by mouth daily 16   Phoebe Messina MD   metformin (GLUCOPHAGE) 500 MG tablet Take 1 tablet by mouth 2 times daily (with meals). 3/4/13   Brenda Gonsales MD       Current medications:    Current Facility-Administered Medications   Medication Dose Route Frequency Provider Last Rate Last Admin    0.9 % sodium chloride infusion   IntraVENous Continuous Berta Yeboah MD           Allergies:     Allergies   Allergen Reactions    Bactrim [Sulfamethoxazole-Trimethoprim] Other (See Comments)     Yeast infection    Lisinopril Other (See Comments)     cough       Problem List:    Patient Active Problem List   Diagnosis Code    Rotator cuff tear, right M75.101    Precordial pain R07.2    Essential hypertension I10    Type 2 diabetes mellitus without complication (HCC) O17.2    GERD (gastroesophageal reflux disease) K21.9    Coronary artery disease involving native coronary artery of native heart with angina pectoris (HCC) I25.119    Mixed hyperlipidemia E78.2    Hypertensive urgency I16.0    Chest pain R07.9       Past Medical History:        Diagnosis Date    Abdominal pain     Arthritis     Asthma     Coronary artery disease involving native coronary artery of native heart with angina pectoris ([facility] [address] 2/10/2017    Diabetes mellitus ([facility]     GERD (gastroesophageal reflux disease) 2017    Hypertension     Mitral valve prolapse     Mixed hyperlipidemia 2018    Rotator cuff tear 10/23/2013       Past Surgical History:        Procedure Laterality Date    CORONARY ANGIOPLASTY WITH STENT PLACEMENT      TONSILLECTOMY      WRIST FRACTURE SURGERY Left     3 years ago       Social History:    Social History     Tobacco Use    Smoking status: Former Smoker     Quit date: 2002     Years since quittin.9    Smokeless tobacco: Never Used   Substance Use Topics    Alcohol use: No                                Counseling given: Not Answered      Vital Signs (Current):   Vitals:    22 0738   BP: 129/71   Pulse: 78   Resp: 16   Temp: 97.9 °F (36.6 °C)   TempSrc: Temporal   SpO2: 97%   Weight: 186 lb (84.4 kg)   Height: 5' 5\" (1.651 m)                                              BP Readings from Last 3 Encounters:   22 129/71   22 (!) 162/59   22 (!) 151/79       NPO Status: Time of last liquid consumption:                         Time of last solid consumption:                         Date of last liquid consumption: 22                        Date of last solid food consumption: 22    BMI:   Wt Readings from Last 3 Encounters:   22 186 lb (84.4 kg)   22 180 lb 2.6 oz (81.7 kg)   21 198 lb (89.8 kg)     Body mass index is 30.95 kg/m².     CBC:   Lab Results   Component Value Date    WBC 6.5 06/26/2022    RBC 3.92 06/26/2022    HGB 14.1 06/26/2022    HCT 41.1 06/26/2022    .8 06/26/2022    RDW 14.4 06/26/2022     06/26/2022       CMP:   Lab Results   Component Value Date     06/26/2022    K 4.5 06/26/2022    K 4.3 03/20/2022     06/26/2022    CO2 25 06/26/2022    BUN 15 06/26/2022    CREATININE 0.6 06/26/2022    GFRAA >60 06/26/2022    GFRAA >60 03/03/2013    AGRATIO 2.0 06/26/2022    LABGLOM >60 06/26/2022    GLUCOSE 139 06/26/2022    PROT 6.7 06/26/2022    CALCIUM 9.5 06/26/2022    BILITOT 0.4 06/26/2022    ALKPHOS 90 06/26/2022    AST 26 06/26/2022    ALT 19 06/26/2022       POC Tests: No results for input(s): POCGLU, POCNA, POCK, POCCL, POCBUN, POCHEMO, POCHCT in the last 72 hours. Coags: No results found for: PROTIME, INR, APTT    HCG (If Applicable): No results found for: PREGTESTUR, PREGSERUM, HCG, HCGQUANT     ABGs: No results found for: PHART, PO2ART, TVA0WOF, HLQ3UMG, BEART, E3VQHBXA     Type & Screen (If Applicable):  No results found for: LABABO, LABRH    Drug/Infectious Status (If Applicable):  No results found for: HIV, HEPCAB    COVID-19 Screening (If Applicable): No results found for: COVID19        Anesthesia Evaluation  Patient summary reviewed and Nursing notes reviewed no history of anesthetic complications:   Airway: Mallampati: II  TM distance: >3 FB   Neck ROM: full  Mouth opening: > = 3 FB   Dental: normal exam   (+) upper dentures      Pulmonary:   (+) asthma:     (-) shortness of breath                           Cardiovascular:    (+) hypertension:, valvular problems/murmurs: MVP, CAD:, CABG/stent: no interval change, hyperlipidemia                  Neuro/Psych:      (-) CVA           GI/Hepatic/Renal:   (+) GERD:,      (-) liver disease       Endo/Other:    (+) DiabetesType II DM, , .    (-) hypothyroidism               Abdominal:             Vascular:     - PVD.       Other Findings:           Anesthesia Plan      MAC     ASA 3 Induction: intravenous. Anesthetic plan and risks discussed with patient. Plan discussed with CRNA.                     Martha Justin MD   6/29/2022

## 2022-06-29 NOTE — H&P
Gastroenterology Note                 Pre-operative History and Physical    Patient: Leticia Au  : 1950  CSN:     History Obtained From:   Patient or guardian. HISTORY OF PRESENT ILLNESS:    The patient is a 67 y.o. female here for Endoscopy. Past Medical History:    Past Medical History:   Diagnosis Date    Abdominal pain     Arthritis     Asthma     Coronary artery disease involving native coronary artery of native heart with angina pectoris ([facility] [address] 2/10/2017    Diabetes mellitus ([facility] [address]     GERD (gastroesophageal reflux disease) 2017    Hypertension     Mitral valve prolapse     Mixed hyperlipidemia 2018    Rotator cuff tear 10/23/2013     Past Surgical History:    Past Surgical History:   Procedure Laterality Date    CORONARY ANGIOPLASTY WITH STENT PLACEMENT      TONSILLECTOMY      WRIST FRACTURE SURGERY Left     3 years ago     Medications Prior to Admission:   No current facility-administered medications on file prior to encounter. Current Outpatient Medications on File Prior to Encounter   Medication Sig Dispense Refill    pantoprazole (PROTONIX) 40 MG injection Infuse 40 mg intravenously daily      sucralfate (CARAFATE) 1 GM/10ML suspension Take 10 mLs by mouth 4 times daily 1200 mL 0    lidocaine viscous hcl (XYLOCAINE) 2 % SOLN solution Take 10 mLs by mouth as needed for Irritation 50 mL 0    acetaminophen (TYLENOL) 500 MG tablet Take 1 tablet by mouth 4 times daily as needed for Pain 60 tablet 0    glimepiride (AMARYL) 4 MG tablet Take 4 mg by mouth every morning (before breakfast)      naproxen sodium (ALEVE) 220 MG tablet Take 220 mg by mouth 2 times daily (with meals)      losartan (COZAAR) 100 MG tablet Take 1 tablet by mouth daily 30 tablet 3    metformin (GLUCOPHAGE) 500 MG tablet Take 1 tablet by mouth 2 times daily (with meals).  60 tablet 0        Allergies:  Bactrim [sulfamethoxazole-trimethoprim] and Lisinopril      Social History:   Social History     Tobacco Use    Smoking status: Former Smoker     Quit date: 2002     Years since quittin.9    Smokeless tobacco: Never Used   Substance Use Topics    Alcohol use: No     Family History:   Family History   Problem Relation Age of Onset    Diabetes Mother     Kidney Disease Mother     Diabetes Father     Stroke Father     Heart Disease Father     Arthritis Other     Hypertension Other        PHYSICAL EXAM:      /71   Pulse 78   Temp 97.9 °F (36.6 °C) (Temporal)   Resp 16   Ht 5' 5\" (1.651 m)   Wt 186 lb (84.4 kg)   SpO2 97%   BMI 30.95 kg/m²  I        Heart:  RRR, normal s1s2    Lungs:  CTA and normal effort    Abdomen:   Soft, mild epig ttp nd. ASSESSMENT AND PLAN:    1. Patient is a 67 y.o. female here for endoscopy with MAC sedation. 2.  Procedure options, risks and benefits reviewed with patient and/or guardian. They express understanding.

## 2022-08-04 ENCOUNTER — HOSPITAL ENCOUNTER (OUTPATIENT)
Age: 72
Discharge: HOME | End: 2022-08-04
Payer: COMMERCIAL

## 2022-08-04 LAB
ANION GAP SERPL CALCULATED.3IONS-SCNC: 12 MMOL/L (ref 3–16)
BUN BLDV-MCNC: 15 MG/DL (ref 7–20)
CALCIUM SERPL-MCNC: 9.6 MG/DL (ref 8.3–10.6)
CHLORIDE BLD-SCNC: 103 MMOL/L (ref 99–110)
CO2: 25 MMOL/L (ref 21–32)
CREAT SERPL-MCNC: 0.6 MG/DL (ref 0.6–1.2)
GFR AFRICAN AMERICAN: >60
GFR NON-AFRICAN AMERICAN: >60
GLUCOSE BLD-MCNC: 128 MG/DL (ref 70–99)
MAGNESIUM: 2 MG/DL (ref 1.8–2.4)
POTASSIUM SERPL-SCNC: 4 MMOL/L (ref 3.5–5.1)
SODIUM BLD-SCNC: 140 MMOL/L (ref 136–145)
VITAMIN B-12: <150 PG/ML (ref 211–911)
VITAMIN D 25-HYDROXY: 21.6 NG/ML

## 2022-08-04 PROCEDURE — 80048 BASIC METABOLIC PNL TOTAL CA: CPT

## 2022-08-04 PROCEDURE — 36415 COLL VENOUS BLD VENIPUNCTURE: CPT

## 2022-08-04 PROCEDURE — 82607 VITAMIN B-12: CPT

## 2022-08-04 PROCEDURE — 83516 IMMUNOASSAY NONANTIBODY: CPT

## 2022-08-04 PROCEDURE — 83735 ASSAY OF MAGNESIUM: CPT

## 2022-08-04 PROCEDURE — 82306 VITAMIN D 25 HYDROXY: CPT

## 2022-08-04 PROCEDURE — 86340 INTRINSIC FACTOR ANTIBODY: CPT

## 2022-08-04 PROCEDURE — 82746 ASSAY OF FOLIC ACID SERUM: CPT

## 2022-08-05 LAB — FOLATE: >20 NG/ML (ref 4.78–24.2)

## 2022-08-06 LAB — GASTRIC PARIETAL CELL ANTIBODY: 49.9 UNITS (ref 0–24.9)

## 2022-08-07 LAB — INTRINSIC FACTOR BLOCKING AB: NEGATIVE

## 2022-10-01 ENCOUNTER — HOSPITAL ENCOUNTER (EMERGENCY)
Age: 72
Discharge: HOME | End: 2022-10-01
Payer: COMMERCIAL

## 2022-10-01 ENCOUNTER — APPOINTMENT (OUTPATIENT)
Dept: GENERAL RADIOLOGY | Age: 72
End: 2022-10-01
Payer: COMMERCIAL

## 2022-10-01 VITALS
OXYGEN SATURATION: 96 % | SYSTOLIC BLOOD PRESSURE: 187 MMHG | TEMPERATURE: 97.8 F | RESPIRATION RATE: 16 BRPM | WEIGHT: 187 LBS | HEART RATE: 65 BPM | BODY MASS INDEX: 31.92 KG/M2 | HEIGHT: 64 IN | DIASTOLIC BLOOD PRESSURE: 75 MMHG

## 2022-10-01 DIAGNOSIS — M25.511 CHRONIC RIGHT SHOULDER PAIN: ICD-10-CM

## 2022-10-01 DIAGNOSIS — G89.29 CHRONIC RIGHT SHOULDER PAIN: ICD-10-CM

## 2022-10-01 DIAGNOSIS — S46.911A STRAIN OF RIGHT SHOULDER, INITIAL ENCOUNTER: Primary | ICD-10-CM

## 2022-10-01 PROCEDURE — 99284 EMERGENCY DEPT VISIT MOD MDM: CPT

## 2022-10-01 PROCEDURE — 96372 THER/PROPH/DIAG INJ SC/IM: CPT

## 2022-10-01 PROCEDURE — 6360000002 HC RX W HCPCS: Performed by: NURSE PRACTITIONER

## 2022-10-01 PROCEDURE — 73030 X-RAY EXAM OF SHOULDER: CPT

## 2022-10-01 RX ORDER — AMLODIPINE BESYLATE 5 MG/1
5 TABLET ORAL DAILY
COMMUNITY

## 2022-10-01 RX ORDER — KETOROLAC TROMETHAMINE 30 MG/ML
15 INJECTION, SOLUTION INTRAMUSCULAR; INTRAVENOUS ONCE
Status: COMPLETED | OUTPATIENT
Start: 2022-10-01 | End: 2022-10-01

## 2022-10-01 RX ORDER — PREDNISONE 10 MG/1
TABLET ORAL
Qty: 44 TABLET | Refills: 0 | Status: SHIPPED | OUTPATIENT
Start: 2022-10-01 | End: 2022-10-11

## 2022-10-01 RX ORDER — ORPHENADRINE CITRATE 30 MG/ML
60 INJECTION INTRAMUSCULAR; INTRAVENOUS ONCE
Status: COMPLETED | OUTPATIENT
Start: 2022-10-01 | End: 2022-10-01

## 2022-10-01 RX ORDER — LIDOCAINE 50 MG/G
1 PATCH TOPICAL DAILY
Qty: 30 PATCH | Refills: 0 | Status: SHIPPED | OUTPATIENT
Start: 2022-10-01

## 2022-10-01 RX ORDER — METHOCARBAMOL 500 MG/1
500 TABLET, FILM COATED ORAL 3 TIMES DAILY PRN
Qty: 30 TABLET | Refills: 0 | Status: SHIPPED | OUTPATIENT
Start: 2022-10-01 | End: 2022-10-11

## 2022-10-01 RX ADMIN — ORPHENADRINE CITRATE 60 MG: 30 INJECTION INTRAMUSCULAR; INTRAVENOUS at 20:34

## 2022-10-01 RX ADMIN — KETOROLAC TROMETHAMINE 15 MG: 30 INJECTION, SOLUTION INTRAMUSCULAR at 20:34

## 2022-10-01 ASSESSMENT — ENCOUNTER SYMPTOMS
COLOR CHANGE: 0
COUGH: 0
VOMITING: 0
WHEEZING: 0
BACK PAIN: 0
SHORTNESS OF BREATH: 0
DIARRHEA: 0
NAUSEA: 0
ABDOMINAL PAIN: 0

## 2022-10-01 ASSESSMENT — PAIN SCALES - GENERAL
PAINLEVEL_OUTOF10: 9
PAINLEVEL_OUTOF10: 9

## 2022-10-01 ASSESSMENT — PAIN - FUNCTIONAL ASSESSMENT: PAIN_FUNCTIONAL_ASSESSMENT: 0-10

## 2022-10-01 ASSESSMENT — PAIN DESCRIPTION - ORIENTATION: ORIENTATION: RIGHT

## 2022-10-01 ASSESSMENT — PAIN DESCRIPTION - LOCATION
LOCATION: SHOULDER
LOCATION: SHOULDER

## 2022-10-01 ASSESSMENT — PAIN DESCRIPTION - PAIN TYPE: TYPE: ACUTE PAIN

## 2022-10-01 ASSESSMENT — PAIN DESCRIPTION - FREQUENCY: FREQUENCY: CONTINUOUS

## 2022-10-02 NOTE — ED PROVIDER NOTES
**ADVANCED PRACTICE PROVIDER, I HAVE EVALUATED THIS PATIENT**        [facility]      Pt Name: Jovana Jackson  QYQ:[id number]  [place] 1950  Date of evaluation: 10/1/2022  Provider: MAXIM Ponce CNP      Chief Complaint:    Chief Complaint   Patient presents with    Shoulder Pain     Walk in pr w c/o R shoulder pain (9/10) that started yesterday at noon but gradually worsen. Denied injury. Took Tylenol and Ibuprofen w/o relief         Nursing Notes, Past Medical Hx, Past Surgical Hx, Social Hx, Allergies, and Family Hx were all reviewed and agreed with or any disagreements were addressed in the HPI.    HPI: (Location, Duration, Timing, Severity, Quality, Assoc Sx, Context, Modifying factors)    Chief Complaint of right shoulder pain    This is a  67 y.o. female who presents today with right shoulder pain, patient states the pain started around 12 noon yesterday however its gotten worse, she denies any falls traumas or injuries. She states that she has a known right shoulder injury as she had a rotator cuff injury a while back, she states that she has been doing a lot of cleaning at [employer] and feels like she aggravated it. She is been taking Tylenol and Aleve with out an improvement. She also has a history of underlying arthritis. She does report that she follows [place] [facility] surgery however, could not get her pain under control therefore she came to the ER today. She denies any numbness tingling or paresthesias. She states the pain is worse with movement. She rates that pain 9 out of 10, no distal complaints, no additional aggravating relieving factors. Patient presents awake, alert and in no acute respiratory distress or toxic appearance.     PastMedical/Surgical History:      Diagnosis Date    Abdominal pain     Arthritis     Asthma     Coronary artery disease involving native coronary artery of native heart with angina pectoris ([facility] [address] 2/10/2017    Diabetes mellitus ([facility] [address]     GERD (gastroesophageal reflux disease) 1/30/2017    Hypertension     Mitral valve prolapse     Mixed hyperlipidemia 1/31/2018    Rotator cuff tear 10/23/2013         Procedure Laterality Date    CORONARY ANGIOPLASTY WITH STENT PLACEMENT      TONSILLECTOMY      UPPER GASTROINTESTINAL ENDOSCOPY N/A 6/29/2022    EGD BIOPSY performed by Gaylia Goodpasture, MD at [address]     3 years ago       Medications:  Previous Medications    ACETAMINOPHEN (TYLENOL) 500 MG TABLET    Take 1 tablet by mouth 4 times daily as needed for Pain    AMLODIPINE (NORVASC) 5 MG TABLET    Take 5 mg by mouth daily    GLIMEPIRIDE (AMARYL) 4 MG TABLET    Take 4 mg by mouth every morning (before breakfast)    LIDOCAINE VISCOUS HCL (XYLOCAINE) 2 % SOLN SOLUTION    Take 10 mLs by mouth as needed for Irritation    LOSARTAN (COZAAR) 100 MG TABLET    Take 1 tablet by mouth daily    METFORMIN (GLUCOPHAGE) 500 MG TABLET    Take 1 tablet by mouth 2 times daily (with meals). NAPROXEN SODIUM (ANAPROX) 220 MG TABLET    Take 220 mg by mouth 2 times daily (with meals)    PANTOPRAZOLE (PROTONIX) 40 MG INJECTION    Infuse 40 mg intravenously daily    SUCRALFATE (CARAFATE) 1 GM/10ML SUSPENSION    Take 10 mLs by mouth 4 times daily         Review of Systems:  (2-9 systems needed)  Review of Systems   Constitutional:  Negative for chills and fever. HENT:  Negative for congestion. Respiratory:  Negative for cough, shortness of breath and wheezing. Cardiovascular:  Negative for chest pain. Gastrointestinal:  Negative for abdominal pain, diarrhea, nausea and vomiting. Musculoskeletal:  Positive for arthralgias, joint swelling and myalgias. Negative for back pain. Patient complains of right shoulder pain, patient states the pain started around 12 noon yesterday however its gotten worse, she denies any falls traumas or injuries.   She states that she has a known right shoulder injury as she had a rotator cuff injury a while back, she states that she has been doing a lot of cleaning at [employer] and feels like she aggravated it. Skin:  Negative for color change. Neurological:  Negative for weakness, numbness and headaches. \"Positives and Pertinent negatives as per HPI\"    Physical Exam:  Physical Exam  Vitals and nursing note reviewed. Constitutional:       Appearance: She is well-developed. She is not diaphoretic. HENT:      Head: Normocephalic. Right Ear: External ear normal.      Left Ear: External ear normal.   Eyes:      General: No scleral icterus. Right eye: No discharge. Left eye: No discharge. Cardiovascular:      Rate and Rhythm: Normal rate. Pulmonary:      Effort: Pulmonary effort is normal. No respiratory distress. Breath sounds: Normal breath sounds. Musculoskeletal:         General: Swelling and tenderness present. Normal range of motion. Cervical back: Normal range of motion and neck supple. Comments: Patient is some mild swelling and tenderness of the right anterior shoulder however she does have active range of motion but when I try to do any Apley's maneuver this does elicit worsening pain. Compartments in the right arm are soft, she is neurovascularly neurology intact without numbness or paresthesias. Her cap refill less than 3 seconds and radial pulses 2+. Patient states that she has a known rotator cuff injury, there is no erythema or warmth to the joint when compared to the left shoulder. Skin:     General: Skin is warm. Capillary Refill: Capillary refill takes less than 2 seconds. Coloration: Skin is not pale. Neurological:      Mental Status: She is alert and oriented to person, place, and time. GCS: GCS eye subscore is 4. GCS verbal subscore is 5. GCS motor subscore is 6.    Psychiatric:         Behavior: Behavior normal.       MEDICAL DECISION MAKING    Vitals: Vitals:    10/01/22 1920   BP: (!) 187/75   Pulse: 65   Resp: 16   Temp: 97.8 °F (36.6 °C)   TempSrc: Oral   SpO2: 96%   Weight: 187 lb (84.8 kg)   Height: 5' 4\" (1.626 m)       LABS:Labs Reviewed - No data to display     Remainder of labs reviewed and were negative at this time or not returned at the time of this note. RADIOLOGY:   Non-plain film images such as CT, Ultrasound and MRI are read by the radiologist. Maude CORTES, MAXIM - CNP have directly visualized the radiologic plain film image(s) with the below findings:      Interpretation per the Radiologist below, if available at the time of this note:    XR SHOULDER RIGHT (MIN 2 VIEWS)   Final Result   No acute osseous abnormality. Follow-up imaging recommended if pain persists or worsens following   conservative management. MEDICAL DECISION MAKING / ED COURSE:      PROCEDURES:   Procedures    None    Patient was given:  Medications   orphenadrine (NORFLEX) injection 60 mg (has no administration in time range)   ketorolac (TORADOL) injection 15 mg (has no administration in time range)       Patient complains of right shoulder pain, patient states the pain started around 12 noon yesterday however its gotten worse, she denies any falls traumas or injuries. She states that she has a known right shoulder injury as she had a rotator cuff injury a while back, she states that she has been doing a lot of cleaning at [employer] and feels like she aggravated it. After evaluation and examination patient x-ray was obtained, x-ray shows no acute osseous abnormality, I did evaluate the patient's chart, she does have a torn rotator cuff but she never had it surgically repaired, I did give her Norflex and Toradol for her discomfort today.   I did educate her that she needs to follow-up with [place] orthopedic specialist which is where she typically goes in order to help manage her pain and come up to a conclusion on how they are going to do with this known tear that she has. I educated her that overuse of this could just cause worsening pain. However, at this time no concern for acute fracture, dislocation, DVT, septic joint or other emergent etiology. Therefore, shared medical decision was made to the patient myself we agreed patient to be discharged with outpatient follow-up. Patient was discharged home with referral back to orthopedic surgery, call first thing Monday morning to make an appointment. Discharged home with Voltaren gel, Lidoderm patches, Robaxin and steroids. Educated take medicine as prescribed. He also educated that she could put some ice on the area. Return to the ER for worsening or concerning symptoms. The patient tolerated their visit well. I evaluated the patient. The physician was available for consultation as needed. The patient and / or the family were informed of the results of any tests, a time was given to answer questions, a plan was proposed and they agreed with plan. Patient verbalized understanding of discharge instructions and the patient was discharged from the department in stable condition    I am the Primary Clinician of Record. CLINICAL IMPRESSION:  1. Strain of right shoulder, initial encounter    2. Chronic right shoulder pain        DISPOSITION Decision To Discharge 10/01/2022 08:22:36 PM      PATIENT REFERRED TO:  Sandy Prado, [address] [address]    Schedule an appointment as soon as possible for a visit   As needed      Follow-up with your orthopedic surgeon by making appointment first thing Monday morning        [facility] Emergency Department  [address] [place] [address]  [phone]  Go to   If symptoms worsen    DISCHARGE MEDICATIONS:  New Prescriptions    DICLOFENAC SODIUM (VOLTAREN) 1 % GEL    Apply 2 g topically 4 times daily    LIDOCAINE (LIDODERM) 5 %    Place 1 patch onto the skin daily 12 hours on, 12 hours off. METHOCARBAMOL (ROBAXIN) 500 MG TABLET    Take 1 tablet by mouth 3 times daily as needed (Musculoskeletal pain)    PREDNISONE (DELTASONE) 10 MG TABLET    60 mg po x 5 days then   40 mg po x 2 days then  20 mg po x 2 days then  10 mg po x 2 days total of 11 days       DISCONTINUED MEDICATIONS:  Discontinued Medications    No medications on file              (Please note the MDM and HPI sections of this note were completed with a voice recognition program.  Efforts were made to edit the dictations but occasionally words are mis-transcribed.)    Electronically signed, MAXIM Ferraro CNP,           MAXIM Ferraro CNP  10/01/22 2027

## 2022-10-14 ENCOUNTER — HOSPITAL ENCOUNTER (EMERGENCY)
Age: 72
Discharge: HOME | End: 2022-10-14
Attending: EMERGENCY MEDICINE
Payer: COMMERCIAL

## 2022-10-14 ENCOUNTER — APPOINTMENT (OUTPATIENT)
Dept: CT IMAGING | Age: 72
End: 2022-10-14
Payer: COMMERCIAL

## 2022-10-14 VITALS
BODY MASS INDEX: 30.49 KG/M2 | HEIGHT: 65 IN | RESPIRATION RATE: 18 BRPM | OXYGEN SATURATION: 96 % | SYSTOLIC BLOOD PRESSURE: 154 MMHG | TEMPERATURE: 97.8 F | DIASTOLIC BLOOD PRESSURE: 84 MMHG | WEIGHT: 183 LBS | HEART RATE: 64 BPM

## 2022-10-14 DIAGNOSIS — R10.9 RIGHT FLANK PAIN: Primary | ICD-10-CM

## 2022-10-14 DIAGNOSIS — S39.012A LUMBAR STRAIN, INITIAL ENCOUNTER: ICD-10-CM

## 2022-10-14 LAB
A/G RATIO: 1.4 (ref 1.1–2.2)
ALBUMIN SERPL-MCNC: 3.9 G/DL (ref 3.4–5)
ALP BLD-CCNC: 93 U/L (ref 40–129)
ALT SERPL-CCNC: 14 U/L (ref 10–40)
ANION GAP SERPL CALCULATED.3IONS-SCNC: 8 MMOL/L (ref 3–16)
AST SERPL-CCNC: 11 U/L (ref 15–37)
BACTERIA: ABNORMAL /HPF
BASOPHILS ABSOLUTE: 0.1 K/UL (ref 0–0.2)
BASOPHILS RELATIVE PERCENT: 0.7 %
BILIRUB SERPL-MCNC: 0.4 MG/DL (ref 0–1)
BILIRUBIN URINE: NEGATIVE
BLOOD, URINE: NEGATIVE
BUN BLDV-MCNC: 13 MG/DL (ref 7–20)
CALCIUM SERPL-MCNC: 9.3 MG/DL (ref 8.3–10.6)
CHLORIDE BLD-SCNC: 104 MMOL/L (ref 99–110)
CLARITY: CLEAR
CO2: 26 MMOL/L (ref 21–32)
COLOR: YELLOW
CREAT SERPL-MCNC: <0.5 MG/DL (ref 0.6–1.2)
EOSINOPHILS ABSOLUTE: 0.2 K/UL (ref 0–0.6)
EOSINOPHILS RELATIVE PERCENT: 2.1 %
EPITHELIAL CELLS, UA: 6 /HPF (ref 0–5)
GFR AFRICAN AMERICAN: >60
GFR NON-AFRICAN AMERICAN: >60
GLUCOSE BLD-MCNC: 117 MG/DL (ref 70–99)
GLUCOSE URINE: 250 MG/DL
HCT VFR BLD CALC: 41.3 % (ref 36–48)
HEMOGLOBIN: 14.1 G/DL (ref 12–16)
HYALINE CASTS: 0 /LPF (ref 0–8)
KETONES, URINE: NEGATIVE MG/DL
LEUKOCYTE ESTERASE, URINE: ABNORMAL
LYMPHOCYTES ABSOLUTE: 2.8 K/UL (ref 1–5.1)
LYMPHOCYTES RELATIVE PERCENT: 30.2 %
MCH RBC QN AUTO: 29.5 PG (ref 26–34)
MCHC RBC AUTO-ENTMCNC: 34 G/DL (ref 31–36)
MCV RBC AUTO: 86.6 FL (ref 80–100)
MICROSCOPIC EXAMINATION: YES
MONOCYTES ABSOLUTE: 0.7 K/UL (ref 0–1.3)
MONOCYTES RELATIVE PERCENT: 8 %
NEUTROPHILS ABSOLUTE: 5.5 K/UL (ref 1.7–7.7)
NEUTROPHILS RELATIVE PERCENT: 59 %
NITRITE, URINE: NEGATIVE
PDW BLD-RTO: 13.4 % (ref 12.4–15.4)
PH UA: 5.5 (ref 5–8)
PLATELET # BLD: 238 K/UL (ref 135–450)
PMV BLD AUTO: 7.5 FL (ref 5–10.5)
POTASSIUM REFLEX MAGNESIUM: 3.9 MMOL/L (ref 3.5–5.1)
PROTEIN UA: NEGATIVE MG/DL
RBC # BLD: 4.77 M/UL (ref 4–5.2)
RBC UA: 1 /HPF (ref 0–4)
SODIUM BLD-SCNC: 138 MMOL/L (ref 136–145)
SPECIFIC GRAVITY UA: 1.02 (ref 1–1.03)
TOTAL PROTEIN: 6.6 G/DL (ref 6.4–8.2)
URINE REFLEX TO CULTURE: ABNORMAL
URINE TYPE: ABNORMAL
UROBILINOGEN, URINE: 0.2 E.U./DL
WBC # BLD: 9.3 K/UL (ref 4–11)
WBC UA: 5 /HPF (ref 0–5)

## 2022-10-14 PROCEDURE — 96374 THER/PROPH/DIAG INJ IV PUSH: CPT

## 2022-10-14 PROCEDURE — 36415 COLL VENOUS BLD VENIPUNCTURE: CPT

## 2022-10-14 PROCEDURE — 6360000002 HC RX W HCPCS: Performed by: EMERGENCY MEDICINE

## 2022-10-14 PROCEDURE — 74176 CT ABD & PELVIS W/O CONTRAST: CPT

## 2022-10-14 PROCEDURE — 80053 COMPREHEN METABOLIC PANEL: CPT

## 2022-10-14 PROCEDURE — 99284 EMERGENCY DEPT VISIT MOD MDM: CPT

## 2022-10-14 PROCEDURE — 81001 URINALYSIS AUTO W/SCOPE: CPT

## 2022-10-14 PROCEDURE — 85025 COMPLETE CBC W/AUTO DIFF WBC: CPT

## 2022-10-14 RX ORDER — TRAMADOL HYDROCHLORIDE 50 MG/1
50 TABLET ORAL EVERY 4 HOURS PRN
Qty: 18 TABLET | Refills: 0 | Status: SHIPPED | OUTPATIENT
Start: 2022-10-14 | End: 2022-10-17

## 2022-10-14 RX ORDER — NAPROXEN 500 MG/1
500 TABLET ORAL 2 TIMES DAILY
Qty: 20 TABLET | Refills: 0 | Status: SHIPPED | OUTPATIENT
Start: 2022-10-14 | End: 2022-10-24

## 2022-10-14 RX ORDER — METHOCARBAMOL 500 MG/1
500 TABLET, FILM COATED ORAL 4 TIMES DAILY
Qty: 40 TABLET | Refills: 0 | Status: SHIPPED | OUTPATIENT
Start: 2022-10-14 | End: 2022-10-24

## 2022-10-14 RX ORDER — KETOROLAC TROMETHAMINE 30 MG/ML
15 INJECTION, SOLUTION INTRAMUSCULAR; INTRAVENOUS ONCE
Status: COMPLETED | OUTPATIENT
Start: 2022-10-14 | End: 2022-10-14

## 2022-10-14 RX ADMIN — KETOROLAC TROMETHAMINE 15 MG: 30 INJECTION, SOLUTION INTRAMUSCULAR at 03:59

## 2022-10-14 ASSESSMENT — PAIN SCALES - GENERAL
PAINLEVEL_OUTOF10: 4
PAINLEVEL_OUTOF10: 9
PAINLEVEL_OUTOF10: 9

## 2022-10-14 ASSESSMENT — PAIN - FUNCTIONAL ASSESSMENT: PAIN_FUNCTIONAL_ASSESSMENT: 0-10

## 2022-10-14 NOTE — ED PROVIDER NOTES
[address] [facility]  eMERGENCY dEPARTMENT eNCOUnter        Pt Name: Jennifer Philip  MRN: [id number]  [place] 1950  Date of evaluation: 10/14/2022  Provider: Kala Clements MD  PCP: Araceli Mcconnell MD      CHIEF COMPLAINT       Chief Complaint   Patient presents with    Flank Pain     Pt c/o right flank pain with urinary frequency and urgency that started yesterday, rates pain 9/10        HISTORY OFPRESENT ILLNESS   (Location/Symptom, Timing/Onset, Context/Setting, Quality, Duration, Modifying Factors,Severity)  Note limiting factors. Jennifer Philip is a 67 y.o. female had right flank pain that radiates into the right abdomen with some urinary frequency it started yesterday and got more severe this morning she says the pain is slightly worse in her back when she raises her leg the pain does not go down the leg she is not sure if it is coming from her back she has had no previous kidney stone she has had UTIs in the past    Nursing Notes were all reviewed and agreed with or any disagreements were addressed  in the HPI. REVIEW OF SYSTEMS    (2-9 systems for level 4, 10 or more for level 5)       REVIEW OF SYSTEMS    Constitutional:  Denies fever, chills, or weakness   Eyes:  Denies vision changes  HENT:  Denies sore throat or neck pain   Respiratory:  Denies cough or shortness of breath   Cardiovascular:  Denies chest pain  GI:  Denies abdominal pain, nausea, vomiting, or diarrhea   Musculoskeletal: rt  back pain   Skin: no rash or vesicles   Neurologic:  no headache weakness focal    Lymphatic:  no swollen  nodes   Psychiatric: no si or hs thoughts     All systems negative except as marked. Gu urinary frequency no dysuria  Positives and Pertinent negatives as per HPI. Except as noted above in the ROS, all other systems were reviewed andnegative.        PASTMEDICAL HISTORY     Past Medical History:   Diagnosis Date    Abdominal pain     Arthritis     Asthma Coronary artery disease involving native coronary artery of native heart with angina pectoris ([facility] [address] 2/10/2017    Diabetes mellitus ([facility] [address]     GERD (gastroesophageal reflux disease) 1/30/2017    Hypertension     Mitral valve prolapse     Mixed hyperlipidemia 1/31/2018    Rotator cuff tear 10/23/2013         SURGICAL HISTORY       Past Surgical History:   Procedure Laterality Date    CORONARY ANGIOPLASTY WITH STENT PLACEMENT      TONSILLECTOMY      UPPER GASTROINTESTINAL ENDOSCOPY N/A 6/29/2022    EGD BIOPSY performed by Winfield Curling, MD at [address]     3 years ago         CURRENT MEDICATIONS       Previous Medications    ACETAMINOPHEN (TYLENOL) 500 MG TABLET    Take 1 tablet by mouth 4 times daily as needed for Pain    AMLODIPINE (NORVASC) 5 MG TABLET    Take 5 mg by mouth daily    DICLOFENAC SODIUM (VOLTAREN) 1 % GEL    Apply 2 g topically 4 times daily    GLIMEPIRIDE (AMARYL) 4 MG TABLET    Take 4 mg by mouth every morning (before breakfast)    LIDOCAINE (LIDODERM) 5 %    Place 1 patch onto the skin daily 12 hours on, 12 hours off. LIDOCAINE VISCOUS HCL (XYLOCAINE) 2 % SOLN SOLUTION    Take 10 mLs by mouth as needed for Irritation    LOSARTAN (COZAAR) 100 MG TABLET    Take 1 tablet by mouth daily    METFORMIN (GLUCOPHAGE) 500 MG TABLET    Take 1 tablet by mouth 2 times daily (with meals).     NAPROXEN SODIUM (ANAPROX) 220 MG TABLET    Take 220 mg by mouth 2 times daily (with meals)    PANTOPRAZOLE (PROTONIX) 40 MG INJECTION    Infuse 40 mg intravenously daily    SUCRALFATE (CARAFATE) 1 GM/10ML SUSPENSION    Take 10 mLs by mouth 4 times daily       ALLERGIES     Bactrim [sulfamethoxazole-trimethoprim] and Lisinopril    FAMILY HISTORY       Family History   Problem Relation Age of Onset    Diabetes Mother     Kidney Disease Mother     Diabetes Father     Stroke Father     Heart Disease Father     Arthritis Other     Hypertension Other           SOCIAL HISTORY       Social History     Socioeconomic History    Marital status:      Spouse name: None    Number of children: None    Years of education: None    Highest education level: None   Tobacco Use    Smoking status: Former     Types: Cigarettes     Quit date: 2002     Years since quittin.2    Smokeless tobacco: Never   Vaping Use    Vaping Use: Never used   Substance and Sexual Activity    Alcohol use: No    Drug use: No    Sexual activity: Yes     Partners: Male       SCREENINGS    Brandon Coma Scale  Eye Opening: Spontaneous  Best Verbal Response: Oriented  Best Motor Response: Obeys commands  Brandon Coma Scale Score: 15        PHYSICAL EXAM    (up to 7 for level 4, 8 or more for level 5)     ED Triage Vitals [10/14/22 0329]   BP Temp Temp Source Heart Rate Resp SpO2 Height Weight   (!) 173/88 97.8 °F (36.6 °C) Oral 64 18 96 % 5' 5\" (1.651 m) 183 lb (83 kg)           General Appearance:  Alert, cooperative, no distress, appears stated age. Head:  Normocephalic, without obvious abnormality, atraumatic. Eyes:  conjunctiva/corneas clear, EOM's intact. Sclera anicteric. ENT: Mucous membranes moist.   Neck: Supple, symmetrical, trachea midline, no adenopathy. No jugular venous distention. Lungs:   No Respiratory Distress. no rales  rhonchi rub   Chest Wall:  Nontender  no deformity   Heart:  Rsr no murmer gallop    Abdomen:   Soft nontender no organomegally  no pain on deep palpation of the right kidney   Extremities:  Full range of motion. no deformity   Pulses: Equal  upper and lower    Skin:  No rashes or lesions to exposed skin. Neurologic: Alert and oriented X 3. Motor grossly normal.  Speech clear. CVA is nontender on the right    DIAGNOSTIC RESULTS   LABS:    Labs Reviewed   CBC WITH AUTO DIFFERENTIAL   COMPREHENSIVE METABOLIC PANEL W/ REFLEX TO MG FOR LOW K   URINALYSIS WITH REFLEX TO CULTURE       All other labs were within normal range or not returned as of thisdictation. EKG:  All EKG's are interpreted by the Emergency Department Physician who either signs or Co-signs this chart in the absence of a cardiologist.        RADIOLOGY:   Non-plain film images such as CT, Ultrasound and MRI are read by the radiologist. Vasyl Hampton images are visualized and preliminarily interpreted by the  ED Provider with the belowfindings:        Interpretation per the Radiologist below, if available at the time of this note:    CT ABDOMEN PELVIS WO CONTRAST Additional Contrast? None    (Results Pending)         PROCEDURES   Unless otherwise noted below, none     Procedures    CRITICAL CARE TIME   N/A      CONSULTS:  None    EMERGENCY DEPARTMENT COURSE and DIFFERENTIAL DIAGNOSIS/MDM:   Vitals:    Vitals:    10/14/22 0329   BP: (!) 173/88   Pulse: 64   Resp: 18   Temp: 97.8 °F (36.6 °C)   TempSrc: Oral   SpO2: 96%   Weight: 183 lb (83 kg)   Height: 5' 5\" (1.651 m)       Patient was given the following medications:  Medications   ketorolac (TORADOL) injection 15 mg (has no administration in time range)           Is this patient to be included in the SEP-1 Core Measure due to severe sepsis or septic shock? No   Exclusion criteria - the patient is NOT to be included for SEP-1 Core Measure due to: Infection is not suspected  CT of the abdomen shows no kidney stone urinalysis is unremarkable labs are unremarkable patient has reproducible pain in her right paralumbar region to palpation there is normal looking urine straight leg raising is negative there is no sciatica patient will be given anti-inflammatories muscle relaxants and some pain medication and will follow up with her doctor    The patient tolerated their visit well. Thepatient and / or the family were informed of the results of any tests, a time was given to answer questions. FINAL IMPRESSION    No diagnosis found. DISPOSITION/PLAN   DISPOSITION        PATIENT REFERRED TO:  No follow-up provider specified.     DISCHARGE MEDICATIONS:  New Prescriptions    No medications on file       DISCONTINUED MEDICATIONS:  Discontinued Medications    No medications on file              (Please note that portions of this note were completed with a voice recognition program.  Efforts were made to edit the dictations but occasionally words aremis-transcribed.)    Lesleigh Boxer, MD (electronically signed)          Lesleigh Boxer, MD  10/14/22 7796

## 2023-03-07 ENCOUNTER — APPOINTMENT (OUTPATIENT)
Dept: CT IMAGING | Age: 73
End: 2023-03-07
Payer: COMMERCIAL

## 2023-03-07 ENCOUNTER — HOSPITAL ENCOUNTER (INPATIENT)
Age: 73
LOS: 2 days | Discharge: HOME | End: 2023-03-09
Attending: EMERGENCY MEDICINE | Admitting: INTERNAL MEDICINE
Payer: COMMERCIAL

## 2023-03-07 DIAGNOSIS — R03.0 ELEVATED BLOOD PRESSURE READING: ICD-10-CM

## 2023-03-07 DIAGNOSIS — I63.9 CEREBROVASCULAR ACCIDENT (CVA), UNSPECIFIED MECHANISM (HCC): Primary | ICD-10-CM

## 2023-03-07 PROBLEM — G45.9 TIA (TRANSIENT ISCHEMIC ATTACK): Status: ACTIVE | Noted: 2023-03-07

## 2023-03-07 LAB
ANION GAP SERPL CALCULATED.3IONS-SCNC: 6 MMOL/L (ref 3–16)
BASOPHILS ABSOLUTE: 0 K/UL (ref 0–0.2)
BASOPHILS RELATIVE PERCENT: 0.5 %
BUN BLDV-MCNC: 15 MG/DL (ref 7–20)
CALCIUM SERPL-MCNC: 8.9 MG/DL (ref 8.3–10.6)
CHLORIDE BLD-SCNC: 104 MMOL/L (ref 99–110)
CO2: 26 MMOL/L (ref 21–32)
CREAT SERPL-MCNC: 0.7 MG/DL (ref 0.6–1.2)
EOSINOPHILS ABSOLUTE: 0.3 K/UL (ref 0–0.6)
EOSINOPHILS RELATIVE PERCENT: 6 %
GFR SERPL CREATININE-BSD FRML MDRD: >60 ML/MIN/{1.73_M2}
GLUCOSE BLD-MCNC: 289 MG/DL (ref 70–99)
GLUCOSE BLD-MCNC: 290 MG/DL (ref 70–99)
HCT VFR BLD CALC: 35.7 % (ref 36–48)
HEMOGLOBIN: 12 G/DL (ref 12–16)
LYMPHOCYTES ABSOLUTE: 1.3 K/UL (ref 1–5.1)
LYMPHOCYTES RELATIVE PERCENT: 31.5 %
MCH RBC QN AUTO: 28.4 PG (ref 26–34)
MCHC RBC AUTO-ENTMCNC: 33.8 G/DL (ref 31–36)
MCV RBC AUTO: 84.1 FL (ref 80–100)
MONOCYTES ABSOLUTE: 0.5 K/UL (ref 0–1.3)
MONOCYTES RELATIVE PERCENT: 12.1 %
NEUTROPHILS ABSOLUTE: 2.1 K/UL (ref 1.7–7.7)
NEUTROPHILS RELATIVE PERCENT: 49.9 %
PDW BLD-RTO: 13.4 % (ref 12.4–15.4)
PERFORMED ON: ABNORMAL
PLATELET # BLD: 197 K/UL (ref 135–450)
PMV BLD AUTO: 7.2 FL (ref 5–10.5)
POTASSIUM REFLEX MAGNESIUM: 4.1 MMOL/L (ref 3.5–5.1)
RBC # BLD: 4.24 M/UL (ref 4–5.2)
SODIUM BLD-SCNC: 136 MMOL/L (ref 136–145)
TROPONIN: <0.01 NG/ML
WBC # BLD: 4.3 K/UL (ref 4–11)

## 2023-03-07 PROCEDURE — 36415 COLL VENOUS BLD VENIPUNCTURE: CPT

## 2023-03-07 PROCEDURE — 93005 ELECTROCARDIOGRAM TRACING: CPT | Performed by: EMERGENCY MEDICINE

## 2023-03-07 PROCEDURE — 80048 BASIC METABOLIC PNL TOTAL CA: CPT

## 2023-03-07 PROCEDURE — 84484 ASSAY OF TROPONIN QUANT: CPT

## 2023-03-07 PROCEDURE — 1200000000 HC SEMI PRIVATE

## 2023-03-07 PROCEDURE — 6360000004 HC RX CONTRAST MEDICATION: Performed by: EMERGENCY MEDICINE

## 2023-03-07 PROCEDURE — 85025 COMPLETE CBC W/AUTO DIFF WBC: CPT

## 2023-03-07 PROCEDURE — 94761 N-INVAS EAR/PLS OXIMETRY MLT: CPT

## 2023-03-07 PROCEDURE — 99285 EMERGENCY DEPT VISIT HI MDM: CPT

## 2023-03-07 PROCEDURE — 70498 CT ANGIOGRAPHY NECK: CPT

## 2023-03-07 PROCEDURE — 70450 CT HEAD/BRAIN W/O DYE: CPT

## 2023-03-07 PROCEDURE — 2060000000 HC ICU INTERMEDIATE R&B

## 2023-03-07 RX ORDER — POTASSIUM CHLORIDE 7.45 MG/ML
10 INJECTION INTRAVENOUS PRN
Status: DISCONTINUED | OUTPATIENT
Start: 2023-03-07 | End: 2023-03-09 | Stop reason: HOSPADM

## 2023-03-07 RX ORDER — ENOXAPARIN SODIUM 100 MG/ML
40 INJECTION SUBCUTANEOUS DAILY
Status: DISCONTINUED | OUTPATIENT
Start: 2023-03-08 | End: 2023-03-09 | Stop reason: HOSPADM

## 2023-03-07 RX ORDER — ASPIRIN 81 MG/1
81 TABLET ORAL DAILY
Status: DISCONTINUED | OUTPATIENT
Start: 2023-03-08 | End: 2023-03-09 | Stop reason: HOSPADM

## 2023-03-07 RX ORDER — INSULIN LISPRO 100 [IU]/ML
0-4 INJECTION, SOLUTION INTRAVENOUS; SUBCUTANEOUS NIGHTLY
Status: DISCONTINUED | OUTPATIENT
Start: 2023-03-07 | End: 2023-03-09 | Stop reason: HOSPADM

## 2023-03-07 RX ORDER — POTASSIUM CHLORIDE 20 MEQ/1
40 TABLET, EXTENDED RELEASE ORAL PRN
Status: DISCONTINUED | OUTPATIENT
Start: 2023-03-07 | End: 2023-03-09 | Stop reason: HOSPADM

## 2023-03-07 RX ORDER — SODIUM CHLORIDE 9 MG/ML
INJECTION, SOLUTION INTRAVENOUS PRN
Status: DISCONTINUED | OUTPATIENT
Start: 2023-03-07 | End: 2023-03-09 | Stop reason: HOSPADM

## 2023-03-07 RX ORDER — PANTOPRAZOLE SODIUM 40 MG/10ML
40 INJECTION, POWDER, LYOPHILIZED, FOR SOLUTION INTRAVENOUS DAILY
Status: DISCONTINUED | OUTPATIENT
Start: 2023-03-08 | End: 2023-03-09 | Stop reason: HOSPADM

## 2023-03-07 RX ORDER — NAPROXEN 250 MG/1
250 TABLET ORAL 2 TIMES DAILY WITH MEALS
Status: DISCONTINUED | OUTPATIENT
Start: 2023-03-08 | End: 2023-03-09 | Stop reason: HOSPADM

## 2023-03-07 RX ORDER — ACETAMINOPHEN 500 MG
500 TABLET ORAL 4 TIMES DAILY PRN
Status: DISCONTINUED | OUTPATIENT
Start: 2023-03-07 | End: 2023-03-09 | Stop reason: HOSPADM

## 2023-03-07 RX ORDER — HYDRALAZINE HYDROCHLORIDE 20 MG/ML
10 INJECTION INTRAMUSCULAR; INTRAVENOUS EVERY 6 HOURS PRN
Status: DISCONTINUED | OUTPATIENT
Start: 2023-03-07 | End: 2023-03-09 | Stop reason: HOSPADM

## 2023-03-07 RX ORDER — 0.9 % SODIUM CHLORIDE 0.9 %
500 INTRAVENOUS SOLUTION INTRAVENOUS PRN
Status: DISCONTINUED | OUTPATIENT
Start: 2023-03-07 | End: 2023-03-09 | Stop reason: HOSPADM

## 2023-03-07 RX ORDER — SODIUM CHLORIDE 0.9 % (FLUSH) 0.9 %
10 SYRINGE (ML) INJECTION PRN
Status: DISCONTINUED | OUTPATIENT
Start: 2023-03-07 | End: 2023-03-09 | Stop reason: HOSPADM

## 2023-03-07 RX ORDER — GLIMEPIRIDE 2 MG/1
4 TABLET ORAL
Status: DISCONTINUED | OUTPATIENT
Start: 2023-03-08 | End: 2023-03-09 | Stop reason: HOSPADM

## 2023-03-07 RX ORDER — DEXTROSE MONOHYDRATE 100 MG/ML
INJECTION, SOLUTION INTRAVENOUS CONTINUOUS PRN
Status: DISCONTINUED | OUTPATIENT
Start: 2023-03-07 | End: 2023-03-09 | Stop reason: HOSPADM

## 2023-03-07 RX ORDER — ONDANSETRON 2 MG/ML
4 INJECTION INTRAMUSCULAR; INTRAVENOUS EVERY 6 HOURS PRN
Status: DISCONTINUED | OUTPATIENT
Start: 2023-03-07 | End: 2023-03-09 | Stop reason: HOSPADM

## 2023-03-07 RX ORDER — LABETALOL HYDROCHLORIDE 5 MG/ML
10 INJECTION, SOLUTION INTRAVENOUS EVERY 10 MIN PRN
Status: DISCONTINUED | OUTPATIENT
Start: 2023-03-07 | End: 2023-03-09 | Stop reason: HOSPADM

## 2023-03-07 RX ORDER — AMLODIPINE BESYLATE 5 MG/1
5 TABLET ORAL NIGHTLY
Status: DISCONTINUED | OUTPATIENT
Start: 2023-03-07 | End: 2023-03-09 | Stop reason: HOSPADM

## 2023-03-07 RX ORDER — INSULIN LISPRO 100 [IU]/ML
0-8 INJECTION, SOLUTION INTRAVENOUS; SUBCUTANEOUS
Status: DISCONTINUED | OUTPATIENT
Start: 2023-03-08 | End: 2023-03-09 | Stop reason: HOSPADM

## 2023-03-07 RX ORDER — SODIUM CHLORIDE 0.9 % (FLUSH) 0.9 %
10 SYRINGE (ML) INJECTION EVERY 12 HOURS SCHEDULED
Status: DISCONTINUED | OUTPATIENT
Start: 2023-03-07 | End: 2023-03-09 | Stop reason: HOSPADM

## 2023-03-07 RX ORDER — ONDANSETRON 4 MG/1
4 TABLET, ORALLY DISINTEGRATING ORAL EVERY 8 HOURS PRN
Status: DISCONTINUED | OUTPATIENT
Start: 2023-03-07 | End: 2023-03-09 | Stop reason: HOSPADM

## 2023-03-07 RX ORDER — ASPIRIN 300 MG/1
300 SUPPOSITORY RECTAL DAILY
Status: DISCONTINUED | OUTPATIENT
Start: 2023-03-08 | End: 2023-03-09 | Stop reason: HOSPADM

## 2023-03-07 RX ORDER — LOSARTAN POTASSIUM 100 MG/1
100 TABLET ORAL DAILY
Status: DISCONTINUED | OUTPATIENT
Start: 2023-03-08 | End: 2023-03-09 | Stop reason: HOSPADM

## 2023-03-07 RX ADMIN — IOPAMIDOL 75 ML: 755 INJECTION, SOLUTION INTRAVENOUS at 18:18

## 2023-03-07 ASSESSMENT — PAIN - FUNCTIONAL ASSESSMENT: PAIN_FUNCTIONAL_ASSESSMENT: 0-10

## 2023-03-07 ASSESSMENT — PAIN SCALES - GENERAL
PAINLEVEL_OUTOF10: 0
PAINLEVEL_OUTOF10: 0

## 2023-03-08 ENCOUNTER — APPOINTMENT (OUTPATIENT)
Dept: MRI IMAGING | Age: 73
End: 2023-03-08
Payer: COMMERCIAL

## 2023-03-08 LAB
A/G RATIO: 1.4 (ref 1.1–2.2)
ALBUMIN SERPL-MCNC: 3.8 G/DL (ref 3.4–5)
ALP BLD-CCNC: 100 U/L (ref 40–129)
ALT SERPL-CCNC: 19 U/L (ref 10–40)
ANION GAP SERPL CALCULATED.3IONS-SCNC: 8 MMOL/L (ref 3–16)
AST SERPL-CCNC: 20 U/L (ref 15–37)
BASOPHILS ABSOLUTE: 0 K/UL (ref 0–0.2)
BASOPHILS RELATIVE PERCENT: 0.6 %
BILIRUB SERPL-MCNC: 0.5 MG/DL (ref 0–1)
BUN BLDV-MCNC: 11 MG/DL (ref 7–20)
CALCIUM SERPL-MCNC: 9 MG/DL (ref 8.3–10.6)
CHLORIDE BLD-SCNC: 107 MMOL/L (ref 99–110)
CHOLESTEROL, TOTAL: 165 MG/DL (ref 0–199)
CO2: 25 MMOL/L (ref 21–32)
CREAT SERPL-MCNC: <0.5 MG/DL (ref 0.6–1.2)
EKG ATRIAL RATE: 73 BPM
EKG DIAGNOSIS: NORMAL
EKG P AXIS: 63 DEGREES
EKG P-R INTERVAL: 162 MS
EKG Q-T INTERVAL: 400 MS
EKG QRS DURATION: 76 MS
EKG QTC CALCULATION (BAZETT): 440 MS
EKG R AXIS: 45 DEGREES
EKG T AXIS: 51 DEGREES
EKG VENTRICULAR RATE: 73 BPM
EOSINOPHILS ABSOLUTE: 0.3 K/UL (ref 0–0.6)
EOSINOPHILS RELATIVE PERCENT: 7.5 %
ESTIMATED AVERAGE GLUCOSE: 134.1 MG/DL
GFR SERPL CREATININE-BSD FRML MDRD: >60 ML/MIN/{1.73_M2}
GLUCOSE BLD-MCNC: 154 MG/DL (ref 70–99)
GLUCOSE BLD-MCNC: 156 MG/DL (ref 70–99)
GLUCOSE BLD-MCNC: 161 MG/DL (ref 70–99)
GLUCOSE BLD-MCNC: 166 MG/DL (ref 70–99)
GLUCOSE BLD-MCNC: 183 MG/DL (ref 70–99)
GLUCOSE BLD-MCNC: 197 MG/DL (ref 70–99)
HBA1C MFR BLD: 6.3 %
HCT VFR BLD CALC: 37.5 % (ref 36–48)
HDLC SERPL-MCNC: 35 MG/DL (ref 40–60)
HEMOGLOBIN: 12.6 G/DL (ref 12–16)
LDL CHOLESTEROL CALCULATED: 105 MG/DL
LYMPHOCYTES ABSOLUTE: 0.9 K/UL (ref 1–5.1)
LYMPHOCYTES RELATIVE PERCENT: 23.5 %
MCH RBC QN AUTO: 28.2 PG (ref 26–34)
MCHC RBC AUTO-ENTMCNC: 33.5 G/DL (ref 31–36)
MCV RBC AUTO: 84.1 FL (ref 80–100)
MONOCYTES ABSOLUTE: 0.4 K/UL (ref 0–1.3)
MONOCYTES RELATIVE PERCENT: 10.7 %
NEUTROPHILS ABSOLUTE: 2.3 K/UL (ref 1.7–7.7)
NEUTROPHILS RELATIVE PERCENT: 57.7 %
PDW BLD-RTO: 13.4 % (ref 12.4–15.4)
PERFORMED ON: ABNORMAL
PLATELET # BLD: 175 K/UL (ref 135–450)
PMV BLD AUTO: 7.2 FL (ref 5–10.5)
POTASSIUM REFLEX MAGNESIUM: 4.3 MMOL/L (ref 3.5–5.1)
RBC # BLD: 4.46 M/UL (ref 4–5.2)
SODIUM BLD-SCNC: 140 MMOL/L (ref 136–145)
TOTAL PROTEIN: 6.6 G/DL (ref 6.4–8.2)
TRIGL SERPL-MCNC: 126 MG/DL (ref 0–150)
VLDLC SERPL CALC-MCNC: 25 MG/DL
WBC # BLD: 3.9 K/UL (ref 4–11)

## 2023-03-08 PROCEDURE — 70551 MRI BRAIN STEM W/O DYE: CPT

## 2023-03-08 PROCEDURE — 80053 COMPREHEN METABOLIC PANEL: CPT

## 2023-03-08 PROCEDURE — 97116 GAIT TRAINING THERAPY: CPT

## 2023-03-08 PROCEDURE — 83036 HEMOGLOBIN GLYCOSYLATED A1C: CPT

## 2023-03-08 PROCEDURE — 2580000003 HC RX 258: Performed by: INTERNAL MEDICINE

## 2023-03-08 PROCEDURE — C9113 INJ PANTOPRAZOLE SODIUM, VIA: HCPCS | Performed by: INTERNAL MEDICINE

## 2023-03-08 PROCEDURE — 97161 PT EVAL LOW COMPLEX 20 MIN: CPT

## 2023-03-08 PROCEDURE — 99223 1ST HOSP IP/OBS HIGH 75: CPT

## 2023-03-08 PROCEDURE — 85025 COMPLETE CBC W/AUTO DIFF WBC: CPT

## 2023-03-08 PROCEDURE — 92523 SPEECH SOUND LANG COMPREHEN: CPT

## 2023-03-08 PROCEDURE — 36415 COLL VENOUS BLD VENIPUNCTURE: CPT

## 2023-03-08 PROCEDURE — 92526 ORAL FUNCTION THERAPY: CPT

## 2023-03-08 PROCEDURE — 97165 OT EVAL LOW COMPLEX 30 MIN: CPT

## 2023-03-08 PROCEDURE — 6360000002 HC RX W HCPCS: Performed by: INTERNAL MEDICINE

## 2023-03-08 PROCEDURE — 93010 ELECTROCARDIOGRAM REPORT: CPT | Performed by: INTERNAL MEDICINE

## 2023-03-08 PROCEDURE — 97535 SELF CARE MNGMENT TRAINING: CPT

## 2023-03-08 PROCEDURE — 6370000000 HC RX 637 (ALT 250 FOR IP): Performed by: INTERNAL MEDICINE

## 2023-03-08 PROCEDURE — 92610 EVALUATE SWALLOWING FUNCTION: CPT

## 2023-03-08 PROCEDURE — 1200000000 HC SEMI PRIVATE

## 2023-03-08 PROCEDURE — 97530 THERAPEUTIC ACTIVITIES: CPT

## 2023-03-08 PROCEDURE — 80061 LIPID PANEL: CPT

## 2023-03-08 RX ADMIN — GLIMEPIRIDE 4 MG: 2 TABLET ORAL at 08:36

## 2023-03-08 RX ADMIN — LOSARTAN POTASSIUM 100 MG: 100 TABLET, FILM COATED ORAL at 08:36

## 2023-03-08 RX ADMIN — ASPIRIN 81 MG: 81 TABLET, COATED ORAL at 08:35

## 2023-03-08 RX ADMIN — METFORMIN HYDROCHLORIDE 500 MG: 500 TABLET ORAL at 08:35

## 2023-03-08 RX ADMIN — AMLODIPINE BESYLATE 5 MG: 5 TABLET ORAL at 22:41

## 2023-03-08 RX ADMIN — AMLODIPINE BESYLATE 5 MG: 5 TABLET ORAL at 01:18

## 2023-03-08 RX ADMIN — PANTOPRAZOLE SODIUM 40 MG: 40 INJECTION, POWDER, FOR SOLUTION INTRAVENOUS at 08:34

## 2023-03-08 RX ADMIN — METFORMIN HYDROCHLORIDE 500 MG: 500 TABLET ORAL at 16:40

## 2023-03-08 RX ADMIN — SODIUM CHLORIDE, PRESERVATIVE FREE 10 ML: 5 INJECTION INTRAVENOUS at 08:42

## 2023-03-08 RX ADMIN — NAPROXEN 250 MG: 250 TABLET ORAL at 16:40

## 2023-03-08 RX ADMIN — SODIUM CHLORIDE, PRESERVATIVE FREE 10 ML: 5 INJECTION INTRAVENOUS at 22:41

## 2023-03-08 RX ADMIN — ENOXAPARIN SODIUM 40 MG: 100 INJECTION SUBCUTANEOUS at 08:35

## 2023-03-08 RX ADMIN — NAPROXEN 250 MG: 250 TABLET ORAL at 08:35

## 2023-03-08 ASSESSMENT — PAIN SCALES - GENERAL: PAINLEVEL_OUTOF10: 0

## 2023-03-08 NOTE — PROGRESS NOTES
[facility] [address]   Phone: [phone]    Occupational Therapy    [x] Initial Evaluation            [] Daily Treatment Note         [x] Discharge Summary      Patient: Estefany Powell   : 1950   MRN: [id number]   Date of Service:  3/8/2023    Admitting Diagnosis:  TIA (transient ischemic attack)  Current Admission Summary:  67 y.o. female who presents for evaluation of left-sided numbness. Patient states that she had the onset of left-sided numbness, weakness around 330 today. She states that she has not had a prior weakness. Denies any difficulty speaking. Denies any difficulty swallowing. She denies any prior history of stroke. She is not on anticoagulation. She states that it was difficult to walk because of the heaviness in her leg. She denies any chest pain. She is a diabetic. Past Medical History:  has a past medical history of Abdominal pain, Arthritis, Asthma, Coronary artery disease involving native coronary artery of native heart with angina pectoris ([address] Diabetes mellitus ([address] GERD (gastroesophageal reflux disease), Hypertension, Mitral valve prolapse, Mixed hyperlipidemia, and Rotator cuff tear. Past Surgical History:  has a past surgical history that includes Tonsillectomy; Coronary angioplasty with stent; Wrist fracture surgery (Left); and Upper gastrointestinal endoscopy (N/A, 2022). Discharge Recommendations: Estefany Powell scored a 24/24 on the [facility] ADL Inpatient form. At this time, no further OT is recommended upon discharge due to patient at independent level. Recommend patient returns to prior setting with prior services.       DME Required For Discharge: No DME required    Precautions/Restrictions: no restrictions  Positional Restrictions:no positional restrictions    Pre-Admission Information   Lives With: spouse, grandchildren    Alpha  and his wife and 2 yr old      Type of Home: house  Home Layout: one level  Home Access:  2 step to enter with handrail. Handrails are located on L side. Bathroom Layout: tub/shower unit  Bathroom Equipment:  none  Toilet Height: standard height  Home Equipment: rolling walker, rollator - 4 wheeled walker, single point cane  Transfer Assistance: Independent without use of device  Ambulation Assistance:Independent without use of device, uses 4WW for long distances only for rest breaks, as needed  ADL Assistance: independent with all ADL's  IADL Assistance: independent with homemaking tasks  Active :        [x] Yes                 [] No  Hand Dominance: [] Left                 [x] Right  Current Employment: full time employment. 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 Examination   Vision:   Vision Gross Assessment: Impaired and Vision Corrective Device: wears glasses at all times  Hearing:   CEFERINO/RUNform [facility]  Posture:   good  Sensation:   Reports minor residual numbness and tingling in L UE/LE  Coordination:    Finger-to-noes: slightly impaired on L   Thumb Opposition: WNL   Heel-to-shin: Slightly impaired on Left  ROM:   (B) UE ROM WFL  Strength:   (B) UE gross strength WFL    Therapist Clinical Decision Making (Complexity): low complexity  Clinical Presentation: stable      Subjective  General: Pt supine in bed upon entry. She is pleasant and agreeable to PT/OT evaluations. Spouse also present. Pain: 0/10  Pain Interventions: not applicable        Activities of Daily Living  Basic Activities of Daily Living  Grooming: Independent Comment: pt able to wash hands and complete oral care in stance at sink. Lower Extremity Dressing: Independent Comment: pt able to thread pants and pradeep socks/shoes independently. Pulled pants knees to hips in stance independently.    Comment: Pt politely declined other ADLs this session. Instrumental Activities of Daily Living  No IADL completed on this date. Functional Mobility  Bed Mobility  Supine to Sit: Independent  Sit to Supine: Independent  Scooting: Independent  Comments:  Transfers  Sit to stand transfer:Independent  Stand to sit transfer: Independent  Toilet transfer: Independent  Comments:  Functional Mobility:  Sitting Balance: Independent. Standing Balance: Independent. Functional Mobility: . Independent  Functional Mobility Activity: to/from bathroom, ambulated ~150' in unit hallway independently. Subtle gait deviations notable on LLE; however, pt is a functional/safe Ambulator without device. See PT notes for further details regarding gait quality. Other Therapeutic Interventions    Functional Outcomes  AM-PAC Inpatient Daily Activity Raw Score: 24    Cognition  WFL  Orientation:    A&O x 4  Command Following:   [facility]     Education  Barriers To Learning: none  Patient Education: Patient educated on goals, discharge recommendations  Learning Assessment:  Patient verbalized and demonstrates understanding    Extended time required to address all pt questions and concerns regarding discharge planning. Assessment  Activity Tolerance: Pt tolerated treatment well. Impairments Requiring Therapeutic Intervention: none - eval with same day discharge  Prognosis: good without need for therapy intervention  Clinical Assessment: Patient presenting at independent level for completion of required self care tasks for return to home. Despite subtle differences in LUE fine motor coordination, pt demonstrating independence with self care tasks. Anticipate steady improvement over time as pt resumes participation in ADLs. Eval with d/c at this time. No therapy services indicated. Safety Interventions: patient left in chair, nurse notified, and family/caregiver present    Plan  Frequency: Eval with same day discharge. No follow up required.   Current Treatment Recommendations: Not applicable, evaluation completed with same day discharge. Goals  Patient eval with same day discharge. No goals set as patient is at baseline self care status.       Therapy Session Time     Individual Group Co-treatment   Time In    1054   Time Out    1147   Minutes    53      Timed Code Treatment Minutes: 38 Minutes  Total Treatment Minutes:  53 minutes       Electronically Signed By: CECILY Licea OTR/L  [id number]

## 2023-03-08 NOTE — ED NOTES
Report given to 3T RN. No further questions at this time. Pt transported to floor on tele.       Lizeth Hernandez RN  03/07/23 2434

## 2023-03-08 NOTE — ED PROVIDER NOTES
[address] ENCOUNTER        Patient Name: Olivia Jimenez  MRN: [id number]  [place] 1950  Date of evaluation: 3/7/2023  Provider: Rudolph Waite MD  PCP: Jose Gipson MD  Note Started: 7:27 PM EST 3/7/23    CHIEF COMPLAINT       Numbness (Left arm and left leg feeling heavy and tingling started around 1530 today. Left arm drift and left leg drift.)      HISTORY OF PRESENT ILLNESS: 1 or more Elements     History from : Patient    Limitations to history : None    Olivia Jimenez is a 67 y.o. female who presents for evaluation of left-sided numbness. Patient states that she had the onset of left-sided numbness, weakness around 330 today. She states that she has not had a prior weakness. Denies any difficulty speaking. Denies any difficulty swallowing. She denies any prior history of stroke. She is not on anticoagulation. She states that it was difficult to walk because of the heaviness in her leg. She denies any chest pain. She is a diabetic. Nursing Notes were all reviewed and agreed with or any disagreements were addressed in the HPI. REVIEW OF SYSTEMS :      Review of Systems    Positives and Pertinent negatives as per HPI.      SURGICAL HISTORY     Past Surgical History:   Procedure Laterality Date    CORONARY ANGIOPLASTY WITH STENT PLACEMENT      TONSILLECTOMY      UPPER GASTROINTESTINAL ENDOSCOPY N/A 6/29/2022    EGD BIOPSY performed by Goldy Chávez MD at [address] [place] [address] Left     3 years ago       CURRENTMEDICATIONS       Previous Medications    ACETAMINOPHEN (TYLENOL) 500 MG TABLET    Take 1 tablet by mouth 4 times daily as needed for Pain    AMLODIPINE (NORVASC) 5 MG TABLET    Take 5 mg by mouth daily    DICLOFENAC SODIUM (VOLTAREN) 1 % GEL    Apply 2 g topically 4 times daily    GLIMEPIRIDE (AMARYL) 4 MG TABLET    Take 4 mg by mouth every morning (before breakfast)    LIDOCAINE (LIDODERM) 5 % Place 1 patch onto the skin daily 12 hours on, 12 hours off. LIDOCAINE VISCOUS HCL (XYLOCAINE) 2 % SOLN SOLUTION    Take 10 mLs by mouth as needed for Irritation    LOSARTAN (COZAAR) 100 MG TABLET    Take 1 tablet by mouth daily    METFORMIN (GLUCOPHAGE) 500 MG TABLET    Take 1 tablet by mouth 2 times daily (with meals). NAPROXEN (NAPROSYN) 500 MG TABLET    Take 1 tablet by mouth 2 times daily for 20 doses    NAPROXEN SODIUM (ANAPROX) 220 MG TABLET    Take 220 mg by mouth 2 times daily (with meals)    PANTOPRAZOLE (PROTONIX) 40 MG INJECTION    Infuse 40 mg intravenously daily    SUCRALFATE (CARAFATE) 1 GM/10ML SUSPENSION    Take 10 mLs by mouth 4 times daily       ALLERGIES     Bactrim [sulfamethoxazole-trimethoprim] and Lisinopril    FAMILYHISTORY       Family History   Problem Relation Age of Onset    Diabetes Mother     Kidney Disease Mother     Diabetes Father     Stroke Father     Heart Disease Father     Arthritis Other     Hypertension Other         SOCIAL HISTORY       Social History     Tobacco Use    Smoking status: Former     Types: Cigarettes     Quit date: 2002     Years since quittin.6    Smokeless tobacco: Never   Vaping Use    Vaping Use: Never used   Substance Use Topics    Alcohol use: No    Drug use: No       SCREENINGS   NIH Stroke Scale  Interval: Baseline  Level of Consciousness (1a): Alert  LOC Questions (1b): Answers both correctly  Best Gaze (2): Normal  Facial Palsy (4): Normal symmetrical movement  Motor Arm, Left (5a): No drift  Motor Arm, Right (5b): No drift  Motor Leg, Left (6a): No drift  Motor Leg, Right (6b):  No drift  Limb Ataxia (7): Absent  Sensory (8): Normal  Best Language (9): No aphasia  Dysarthria (10): Normal  Extinction and Inattention (11): No abnormality    Gurwinder Coma Scale  Eye Opening: Spontaneous  Best Verbal Response: Oriented  Best Motor Response: Obeys commands  Gurwinder Coma Scale Score: 15                CIWA Assessment  BP: (!) 146/66  Heart Rate: 74           PHYSICAL EXAM  1 or more Elements     ED Triage Vitals   BP Temp Temp Source Heart Rate Resp SpO2 Height Weight   03/07/23 1842 03/07/23 1842 03/07/23 1842 03/07/23 1839 03/07/23 1839 03/07/23 1839 03/07/23 1839 03/07/23 1839   (!) 161/67 98 °F (36.7 °C) Oral 93 21 97 % 5' 5\" (1.651 m) 187 lb 1 oz (84.9 kg)       General: No acute distress. Alert and Oriented. Appears stated age. HEENT:  No difficulty tolerating oral secretions. Cardiac: Regular rate and rhythm. Radial pulses are intact   Chest: No respiratory distress. No increased work of breathing. No use of accessory muscles for respiration. Abdomen: Soft, nontender, nondistended, non-peritonitic. Extremities:No significant lower extremity edema. Lower extremities are symmetric. Neuro: Neuro:  CN I not assessed. Pupils are equal, round and reactive to light bilaterally. Extraocular motions are full and intact bilaterally. Facial sensation to light touch in the trigeminal distribution is intact bilaterally. Patient able to raise bilateral eyebrows. Symmetric smile. Tongue protrudes midline. Able to puff out cheeks bilaterally. Shrugs shoulders bilaterally. Speech is clear and fluent. No difficulty with word finding. . Truncal ataxia is not present. There is mild drift of the left upper and left lower extremity. The left extremities do appear somewhat weaker than the right. 4-4+ strength of the left and upper and lower extremities. Intact sensation to light touch in all four extremities. There is no pronator drift. Finger to nose testing intact. NIH 2 secondary to extremity drift. Skin:No rash, no erythema  Psych: Calm and cooperative.       DIAGNOSTIC RESULTS   LABS:    Labs Reviewed   BASIC METABOLIC PANEL W/ REFLEX TO MG FOR LOW K - Abnormal; Notable for the following components:       Result Value    Glucose 289 (*)     All other components within normal limits   CBC WITH AUTO DIFFERENTIAL - Abnormal; Notable for the following components:    Hematocrit 35.7 (*)     All other components within normal limits   POCT GLUCOSE - Abnormal; Notable for the following components:    POC Glucose 290 (*)     All other components within normal limits   TROPONIN   POCT GLUCOSE   POCT PT/INR   POCT GLUCOSE       When ordered only abnormal lab results are displayed. All other labs were within normal range or not returned as of this dictation. EKG  The EKG, as interpreted by myself, in the emergency department in the absence of a cardiologist.  normal sinus rhythm with a rate of 73  Axis is   Normal  QTc is  within an acceptable range  Intervals and Durations are unremarkable. No specific ST-T wave changes appreciated. No evidence of acute ischemia. No significant change from prior EKG dated 6/27/2022      RADIOLOGY:   Non-plain film images such as CT, Ultrasound and MRI are read by the radiologist. Plain radiographic images are visualized and preliminarily interpreted by the ED Provider with the below findings:        Interpretation per the Radiologist below, if available at the time of this note:     [facility]   Final Result   No large vessel occlusion in the head or neck. This scan was analyzed using [employer]. ai contact LVO. Identification of suspected   findings is not for diagnostic use beyond notification. Viz LVO is limited to   analysis of imaging data and should not be used in-lieu of full patient   evaluation or relied upon to make or confirm diagnosis. CT HEAD WO CONTRAST   Preliminary Result   No acute intracranial abnormality. Mild cerebral atrophy. This report was discussed with  _____.            CT HEAD WO CONTRAST    Result Date: 3/7/2023  EXAMINATION: CT OF THE HEAD WITHOUT CONTRAST,  3/7/2023 6:16 pm TECHNIQUE: CT of the head was performed without the administration of intravenous contrast. Automated exposure control, iterative reconstruction, and/or weight based adjustment of the mA/kV was utilized to reduce the radiation dose to as low as reasonably achievable. COMPARISON: 01/14/2014 HISTORY: ORDERING SYSTEM PROVIDED HISTORY:  Stroke symptoms TECHNOLOGIST PROVIDED HISTORY: Has a \"code stroke\" or \"stroke alert\" been called? Yes Reason for Exam:  Stroke symptoms Decision Support Exception - unselect if not a suspected or confirmed emergency medical condition->Emergency Medical Condition (MA) Reason for Exam:  Stroke symptoms. Numbness (Left arm and left leg feeling heavy and tingling started around 1530 today. Left arm drift and left leg drift). FINDINGS: BRAIN/VENTRICLES: There is no CT evidence of acute intracranial hemorrhage, mass or mass effect. Cerebral atrophy is noted. The fourth ventricle is in the midline. The basilar cisterns are preserved. There are no abnormal extra-axial collections. ORBITS: The visualized portion of the orbits demonstrate no acute abnormality. SINUSES: The visualized paranasal sinuses and mastoid air cells demonstrate no acute abnormality. SOFT TISSUES/SKULL:  No acute abnormality of the visualized skull or soft tissues. No acute intracranial abnormality. Mild cerebral atrophy. This report was discussed with  _____. CTA HEAD NECK W CONTRAST    Result Date: 3/7/2023  EXAMINATION: CTA OF THE HEAD AND NECK WITH CONTRAST 3/7/2023 6:18 pm: TECHNIQUE: CTA of the head and neck was performed with the administration of intravenous contrast. Multiplanar reformatted images are provided for review. MIP images are provided for review. Stenosis of the internal carotid arteries measured using NASCET criteria. Automated exposure control, iterative reconstruction, and/or weight based adjustment of the mA/kV was utilized to reduce the radiation dose to as low as reasonably achievable. COMPARISON: Noncontrast CT head done same day.  HISTORY: ORDERING SYSTEM PROVIDED HISTORY: Stroke Symptoms TECHNOLOGIST PROVIDED HISTORY: Has a \"code stroke\" or \"stroke alert\" been called? ->Yes Reason for exam:->Stroke Symptoms Decision Support Exception - unselect if not a suspected or confirmed emergency medical condition->Emergency Medical Condition (MA) Reason for Exam: Stroke symptoms. Numbness (Left arm and left leg feeling heavy and tingling started around 1530 today. Left arm drift and left leg drift.). FINDINGS: CTA NECK: AORTIC ARCH/ARCH VESSELS: No dissection or arterial injury. No significant stenosis of the brachiocephalic or subclavian arteries. Atherosclerosis in the visualized thoracic aorta and bilateral subclavian arteries. CAROTID ARTERIES: No dissection, arterial injury, or hemodynamically significant stenosis by NASCET criteria. Bilateral common carotid artery and carotid bulb atherosclerotic plaque. VERTEBRAL ARTERIES: No dissection, arterial injury, or significant stenosis. SOFT TISSUES: The lung apices are clear. No cervical or superior mediastinal lymphadenopathy. The larynx and pharynx are unremarkable. No acute abnormality of the salivary and thyroid glands. BONES: No acute osseous abnormality. Multilevel degenerative changes in the visualized spine are centered at C5-C6 and C6-C7. CTA HEAD: ANTERIOR CIRCULATION: No significant stenosis of the intracranial internal carotid, anterior cerebral, or middle cerebral arteries. No aneurysm. Calcifications in the bilateral intracranial internal carotid arteries without hemodynamically significant stenosis. POSTERIOR CIRCULATION: No significant stenosis of the vertebral, basilar, or posterior cerebral arteries. No aneurysm. OTHER: No dural venous sinus thrombosis on this non-dedicated study. BRAIN: No mass effect or midline shift. No extra-axial fluid collection. The gray-white differentiation is maintained. No large vessel occlusion in the head or neck. This scan was analyzed using [employer]. ai contact LVO. Identification of suspected findings is not for diagnostic use beyond notification.  Viz LVO is limited to analysis of imaging data and should not be used in-lieu of full patient evaluation or relied upon to make or confirm diagnosis. Bedside Ultrasound, as interpreted by me, if performed:    No results found. PROCEDURES     Unless otherwise noted below, none     Procedures    CRITICAL CARE TIME     I personally spent a total of 25 minutes of critical care time in obtaining history, performing a physical exam, bedside monitoring of interventions, collecting and interpreting tests and discussion with consultants but excluding time spent performing procedures, treating other patients and teaching time. PAST MEDICAL HISTORY      has a past medical history of Abdominal pain, Arthritis, Asthma, Coronary artery disease involving native coronary artery of native heart with angina pectoris ([facility] [address] [address] (2/10/2017), Diabetes mellitus ([facility] [address] [facility] GERD (gastroesophageal reflux disease) (1/30/2017), Hypertension, Mitral valve prolapse, Mixed hyperlipidemia (1/31/2018), and Rotator cuff tear (10/23/2013). EMERGENCY DEPARTMENT COURSE and DIFFERENTIAL DIAGNOSIS/MDM:     Vitals:    Vitals:    03/07/23 1839 03/07/23 1842 03/07/23 1930   BP:  (!) 161/67 (!) 146/66   Pulse: 93  74   Resp: 21  21   Temp:  98 °F (36.7 °C)    TempSrc:  Oral    SpO2: 97%  96%   Weight: 187 lb 1 oz (84.9 kg)     Height: 5' 5\" (1.651 m)         Patient was treated with and given the following medications:  Medications   iopamidol (ISOVUE-370) 76 % injection 75 mL (75 mLs IntraVENous Given 3/7/23 1818)             Is this patient to be included in the SEP-1 Core Measure due to severe sepsis or septic shock? No   Exclusion criteria - the patient is NOT to be included for SEP-1 Core Measure due to:   Infection is not suspected    CC/HPI Summary, DDx, ED Course, and Reassessment:     80-year-old female presenting for evaluation of left-sided extremity numbness, weakness that started around 330 today. Code stroke was called upon her arrival to the emergency department. She does have NIH of 2 secondary to left-sided deficits, extremity drift. She has no cranial nerve deficits. Patient was brought to CT scanner for CT CTA imaging. I discussed her care with the  stroke team.  By time of the CT scan been finished and reevaluation, patient actually reports that her symptoms are somewhat improving although still present. Laboratory evaluation reveals no acute abnormality. Cardiac enzymes are normal.  EKG is nonischemic. The differential diagnosis associated with the patient's presentation includes, but is not limited to: CVA, TIA, electrolyte abnormality, ACS    CONSULTS: (Who and What was discussed)  None    Discussion with Other Professionals : Admitting Team   and Consultant      Management of the patient was discussed with hospitalist.  I spoke with the radiologist regarding normal head CT noncontrast.  I also spoke with the  stroke team physician, Dr. Kaden Jaeger regarding the patient's symptoms. Ultimately as patient's symptoms are somewhat improving, patient is not a candidate for thrombolysis at this point in time. Because of her continued symptoms and need for further stroke work-up, patient will be admitted for facilitation of this. Patient is agreeable with this plan. Social Determinants : None    Patient's care impacted by chronic condition(s): Hypertension, CAD, diabetes    Records Reviewed : None    Clinical information obtained from an independent historian. Disposition Considerations (include 1 Tests not done, Shared Decision Making, Pt Expectation of Test or Tx.):         I am the Primary Clinician of Record. FINAL IMPRESSION      1.  Cerebrovascular accident (CVA), unspecified mechanism ([address]    2. Elevated blood pressure reading          DISPOSITION/PLAN     DISPOSITION Decision To Admit 03/07/2023 07:28:08 PM      PATIENT REFERRED TO:  No follow-up provider specified. DISCHARGE MEDICATIONS:  Patient was given scripts for the following medications. I counseled patient how to take these medications:  New Prescriptions    No medications on file       DISCONTINUED MEDICATIONS:  Discontinued Medications    No medications on file              (This chart was generated in part by using Dragon Dictation system and may contain errors related to that system including errors in grammar, punctuation, and spelling, as well as words and phrases that may be inappropriate.  If there are any questions or concerns please feel free to contact the dictating provider for clarification.)    MD Deepak Cota MD  03/07/23 9184

## 2023-03-08 NOTE — PROGRESS NOTES
[facility] [address]   Phone: [phone]    Physical Therapy    [x] Initial Evaluation            [] Daily Treatment Note         [x] Discharge Summary      Patient: Graham Braga   : 1950   MRN: [id number]   Date of Service:  3/8/2023  Admitting Diagnosis: TIA (transient ischemic attack)  Current Admission Summary: The patient is a 68y.o.  years old female with PMHx of HTN, HLD, DM 2 with peripheral neuropathy,  CAD and other medical problems. Pt reports acute onset of left arm and left leg numbness on day of admission. Degree was severe and persistent. No aggravating or relieving factors. Pt's did not have any other symptoms including facial droop, dysarthria or dysphagia. Pt was brought to [facility] ED and where CT head and CTA head and neck showed no acute findings. Symptoms resolved by the time of arrival to the ED. MRI brain completed this morning shows small right thalamus infarct. Pt seen in the room with spouse, reports feeling back to her baseline. She has peripheral neuropathy and states her hand and feet have numbness tingling most days. She denies headache, dysarthria, dysphagia, dizziness, double vision, or new numbness/ tingling. Past Medical History:  has a past medical history of Abdominal pain, Arthritis, Asthma, Coronary artery disease involving native coronary artery of native heart with angina pectoris ([facility] [address] Diabetes mellitus ([facility] [address] GERD (gastroesophageal reflux disease), Hypertension, Mitral valve prolapse, Mixed hyperlipidemia, and Rotator cuff tear. Past Surgical History:  has a past surgical history that includes Tonsillectomy; Coronary angioplasty with stent; Wrist fracture surgery (Left); and Upper gastrointestinal endoscopy (N/A, 2022). Discharge Recommendations: Graham Braga scored a 24/24 on the AM-PAC short mobility form. At this time, no further PT is recommended upon discharge due to independence with all mobility.   Recommend patient returns to prior setting with prior services. DME Required For Discharge: no DME required at discharge  Precautions/Restrictions: medium fall risk  Positional Restrictions:no positional restrictions    Pre-Admission Information   Lives With: spouse, grandchildren Cesilia Romero and his wife and 2 yr old   Type of Home: house  Home Layout: one level  Home Access:  2 step to enter with handrail. Handrails are located on L side. Bathroom Layout: tub/shower unit  Bathroom Equipment:  none  Toilet Height: standard height  Home Equipment: rolling walker, rollator - 4 wheeled walker, single point cane  Transfer Assistance: Independent without use of device  Ambulation Assistance:Independent without use of device, uses 4WW for long distances only for rest breaks, as needed  ADL Assistance: independent with all ADL's  IADL Assistance: independent with homemaking tasks  Active :        [x] Yes  [] No  Hand Dominance: [] Left  [x] Right  Current Employment: full time employment.   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; spending time with family  Recent Falls: no falls    Examination   Vision:   Vision Corrective Device: wears glasses at all times  [facility]:   [facility]  Posture:   Good midline posture, balance  Sensation:   reports numbness and tingling in (B) UE, (B) LE, pt has baseline neuropathy in B hands and feet; more pronounced L toes and L hand, but improved since yesterday  Proprioception:    WFL  Tone:   Normotonic  Coordination Testing: very slight difference L vs R  Heel to Shin: Impaired on Left  Alternating Toe Tapping: Impaired on Left    ROM:   (B) LE AROM WFL, chronic B knee pain, L>R; reports she needs knee injections for the pain  Strength:   (L) Hip: -4        (R) Hip: 4  (L) Knee: -4     (R) Knee: 4  (L) Ankle: -4     (R) Ankle: 4  Therapist Clinical Decision Making (Complexity): low complexity  Clinical Presentation: stable      Subjective  General: Pt supine in bed; , Laura Jose, present; pt reports moving L UE and L LE better than yesterday  Pain: 0/10  Pain Interventions: not applicable       Functional Mobility  Bed Mobility  Supine to Sit: Independent  Sit to Supine: Independent  Scooting: Independent  Comments:  Transfers  Sit to stand transfer: Independent  Stand to sit transfer: Independent  Comments:  Ambulation  Assistive Device: no device  Assistance: Independent  Distance: 150 ft  Gait Mechanics: Pt amb with step through pattern, steady gait, slightly slower moving LLE vs RLE, but mainly symmetrical gait pattern, no LOB, good foot clearance bilaterally  Comments:    Stair Mobility  Stair mobility not completed on this date.   Comments: pt has 2 small steps at home, with good railing; discussed safety with stair amb, and gaitm pattern  Balance  Static Sitting Balance: good: independent with functional balance in unsupported position  Dynamic Sitting Balance: good: independent with functional balance in unsupported position  Static Standing Balance: good: independent with functional balance in unsupported position  Dynamic Standing Balance: good: independent with functional balance in unsupported position  Comments: reaches all directions in standing, tolerates mod pertubations to static balance, turns 360 deg L and R, picks up object from floor; all with no LOB    Other Therapeutic Interventions    Functional Outcomes  AM-PAC Inpatient Mobility Raw Score : 24              Cognition  WFL  Orientation:    alert and oriented x 4  Command Following:   [facility]    Education  Barriers To Learning: none  Patient Education: patient educated on goals, PT role and benefits, plan of care, discharge recommendations  Learning Assessment:  patient verbalizes and demonstrates understanding    Assessment  Activity Tolerance: Pt tolerates well  Impairments Requiring Therapeutic Intervention: none - eval with same day discharge  Prognosis: excellent  Clinical Assessment: Clinical Assessment: Patient presenting at independent level for completion of required self care tasks for return to home. Eval with d/c at this time. No therapy services indicated. Pt has very mild deficits, not affecting functional mobility or ability to care for self. Pt has good safety awareness. Safety Interventions: patient left in chair, call light within reach, gait belt, and nurse notified    Plan  Frequency: Eval with same day discharge. No follow up required.     Therapy Session Time      Individual Group Co-treatment   Time In     1054   Time Out     1147   Minutes     53     Timed Code Treatment Minutes:  38 Minutes  Total Treatment Minutes:  48       Electronically Signed By: Jacques Carrillo, PT  Jacques Carrillo, [id number]

## 2023-03-08 NOTE — H&P
History and Physical  Dr. Ruby Trores  3/7/2023    PCP: Zena Sylvester MD    Cc:   Chief Complaint   Patient presents with    Numbness     Left arm and left leg feeling heavy and tingling started around  today. Left arm drift and left leg drift. HPI:  Haven Scott is a 67 y.o. female who has a past medical history of Abdominal pain, Arthritis, Asthma, Coronary artery disease involving native coronary artery of native heart with angina pectoris ([address] Diabetes mellitus ([address] GERD (gastroesophageal reflux disease), Hypertension, Mitral valve prolapse, Mixed hyperlipidemia, and Rotator cuff tear. Patient presents with TIA (transient ischemic attack). HPI  (1-3 for expanded problem focused, ?4 for detailed/comprehensive)      67 y.o. female who presents for evaluation of left-sided numbness. Patient states that she had the onset of left-sided numbness, weakness around 330 today. She states that she has not had a prior weakness. Denies any difficulty speaking. Denies any difficulty swallowing. She denies any prior history of stroke. She is not on anticoagulation. She states that it was difficult to walk because of the heaviness in her leg. She denies any chest pain. She is a diabetic. Stroke alert called  CT head negative for acute cva  Given persistence of symptoms, stroke team recommends admission    Problem list of hospitalization thus far: Active Hospital Problems    Diagnosis     TIA (transient ischemic attack) [G45.9]      Priority: Medium    Mixed hyperlipidemia [E78.2]     GERD (gastroesophageal reflux disease) [K21.9]     Essential hypertension [I10]     Type 2 diabetes mellitus without complication ([address] [H85.5]          Review of Systems: (1 system for EPF, 2-9 for detailed, 10+ for comprehensive)  Constitutional: Negative for chills and fever. HENT: Negative for dental problem, nosebleeds and rhinorrhea. Eyes: Negative for photophobia and visual disturbance.      Respiratory: Negative for cough, chest tightness and shortness of breath. Cardiovascular: Negative for chest pain and leg swelling. Gastrointestinal: Negative for diarrhea, nausea and vomiting. Endocrine: Negative for polydipsia and polyphagia. Genitourinary: Negative for frequency, hematuria and urgency. Musculoskeletal: Negative for back pain and myalgias. Skin: Negative for rash. Allergic/Immunologic: Negative for food allergies. Neurological: Negative for dizziness, seizures, syncope and facial asymmetry. +left sided weakness  Hematological: Negative for adenopathy. Psychiatric/Behavioral: Negative for dysphoric mood. The patient is not nervous/anxious.              Past Medical History:   Past Medical History:   Diagnosis Date    Abdominal pain     Arthritis     Asthma     Coronary artery disease involving native coronary artery of native heart with angina pectoris ([facility] [address] 2/10/2017    Diabetes mellitus (HCC)     GERD (gastroesophageal reflux disease) 1/30/2017    Hypertension     Mitral valve prolapse     Mixed hyperlipidemia 1/31/2018    Rotator cuff tear 10/23/2013       Past Surgical History:   Past Surgical History:   Procedure Laterality Date    CORONARY ANGIOPLASTY WITH STENT PLACEMENT      TONSILLECTOMY      UPPER GASTROINTESTINAL ENDOSCOPY N/A 6/29/2022    EGD BIOPSY performed by Albania Hoffman MD at [address] [place] [address] Left     3 years ago       Social History:   Social History       Tobacco History       Smoking Status  Former Quit Date  7/27/2002 Smoking Tobacco Type  Cigarettes quit in 7/27/2002      Smokeless Tobacco Use  Never              Alcohol History       Alcohol Use Status  No              Drug Use       Drug Use Status  No              Sexual Activity       Sexually Active  Yes Partners  Male                    Fam History:   Family History   Problem Relation Age of Onset    Diabetes Mother     Kidney Disease Mother     Diabetes Father Stroke Father     Heart Disease Father     Arthritis Other     Hypertension Other        PFSH: The above PMHx, PSHx, SocHx, FamHx has been reviewed by myself. (1 area for detailed, 2-3 for comprehensive)      Code Status: Prior    Meds - following list of home medications fromelectronic chart has been reviewed by myself  Prior to Admission medications    Medication Sig Start Date End Date Taking? Authorizing Provider   naproxen (NAPROSYN) 500 MG tablet Take 1 tablet by mouth 2 times daily for 20 doses 10/14/22 10/24/22  Estefania Sharma MD   amLODIPine (NORVASC) 5 MG tablet Take 5 mg by mouth daily    Historical Provider, MD   diclofenac sodium (VOLTAREN) 1 % GEL Apply 2 g topically 4 times daily 10/1/22   MAXIM Hanley CNP   lidocaine (LIDODERM) 5 % Place 1 patch onto the skin daily 12 hours on, 12 hours off. 10/1/22   MAXIM Hanley CNP   pantoprazole (PROTONIX) 40 MG injection Infuse 40 mg intravenously daily    Historical Provider, MD   sucralfate (CARAFATE) 1 GM/10ML suspension Take 10 mLs by mouth 4 times daily  Patient not taking: Reported on 10/1/2022 6/26/22   Jamee Bazan PA-C   lidocaine viscous hcl (XYLOCAINE) 2 % SOLN solution Take 10 mLs by mouth as needed for Irritation  Patient not taking: Reported on 10/1/2022 6/26/22   Jamee Bazan PA-C   acetaminophen (TYLENOL) 500 MG tablet Take 1 tablet by mouth 4 times daily as needed for Pain 12/31/18   Eugene George MD   glimepiride (AMARYL) 4 MG tablet Take 4 mg by mouth every morning (before breakfast)    Historical Provider, MD   naproxen sodium (ANAPROX) 220 MG tablet Take 220 mg by mouth 2 times daily (with meals)    Historical Provider, MD   losartan (COZAAR) 100 MG tablet Take 1 tablet by mouth daily 1/24/16   Lam Vitale MD   metformin (GLUCOPHAGE) 500 MG tablet Take 1 tablet by mouth 2 times daily (with meals).  3/4/13   Maury Ambrose MD         Allergies   Allergen Reactions    Bactrim [Sulfamethoxazole-Trimethoprim] Other (See Comments)     Yeast infection    Lisinopril Other (See Comments)     cough             EXAM: (2-7 system for EPF/Detailed, ?8 for Comprehensive)  BP (!) 158/72   Pulse 69   Temp 98 °F (36.7 °C) (Oral)   Resp 21   Ht 5' 5\" (1.651 m)   Wt 187 lb 1 oz (84.9 kg)   SpO2 95%   BMI 31.13 kg/m²   Constitutional: vitals as above: alert, appears stated age and cooperative    Psychiatric: normal insight and judgment, oriented to person, place, time, and general circumstances    Head: Normocephalic, without obvious abnormality, atraumatic    Eyes:lids and lashes normal, conjunctivae and sclerae normal and pupils equal, round, reactive to light and accomodation    EMNT: external ears normal, nares midline    Neck: no carotid bruit, supple, symmetrical, trachea midline and thyroid not enlarged, symmetric, no tenderness/mass/nodules     Respiratory: clear to auscultation and percussion bilaterally with normal respiratory effort    Cardiovascular: normal rate, regular rhythm, normal S1 and S2 and no murmurs    Gastrointestinal: soft, non-tender, non-distended, normal bowel sounds, no masses or organomegaly    Extremities: no clubbing, no edema    Skin:No rashes or nodules noted. Neurologic: L arm weakness/numbness       LABS:  Labs Reviewed   BASIC METABOLIC PANEL W/ REFLEX TO MG FOR LOW K - Abnormal; Notable for the following components:       Result Value    Glucose 289 (*)     All other components within normal limits   CBC WITH AUTO DIFFERENTIAL - Abnormal; Notable for the following components:    Hematocrit 35.7 (*)     All other components within normal limits   POCT GLUCOSE - Abnormal; Notable for the following components:    POC Glucose 290 (*)     All other components within normal limits   TROPONIN   POCT GLUCOSE   POCT PT/INR   POCT GLUCOSE         IMAGING:  Imaging results from computerized chart.   Any imaging that has been independently reviewed as noted elsewhere in chart.  CT HEAD WO CONTRAST    Result Date: 3/7/2023  EXAMINATION: CT OF THE HEAD WITHOUT CONTRAST,  3/7/2023 6:16 pm TECHNIQUE: CT of the head was performed without the administration of intravenous contrast. Automated exposure control, iterative reconstruction, and/or weight based adjustment of the mA/kV was utilized to reduce the radiation dose to as low as reasonably achievable. COMPARISON: 01/14/2014 HISTORY: ORDERING SYSTEM PROVIDED HISTORY:  Stroke symptoms TECHNOLOGIST PROVIDED HISTORY: Has a \"code stroke\" or \"stroke alert\" been called? Yes Reason for Exam:  Stroke symptoms Decision Support Exception - unselect if not a suspected or confirmed emergency medical condition->Emergency Medical Condition (MA) Reason for Exam:  Stroke symptoms. Numbness (Left arm and left leg feeling heavy and tingling started around 1530 today. Left arm drift and left leg drift). FINDINGS: BRAIN/VENTRICLES: There is no CT evidence of acute intracranial hemorrhage, mass or mass effect. Cerebral atrophy is noted. The fourth ventricle is in the midline. The basilar cisterns are preserved. There are no abnormal extra-axial collections. ORBITS: The visualized portion of the orbits demonstrate no acute abnormality. SINUSES: The visualized paranasal sinuses and mastoid air cells demonstrate no acute abnormality. SOFT TISSUES/SKULL:  No acute abnormality of the visualized skull or soft tissues. No acute intracranial abnormality. Mild cerebral atrophy. This report was discussed with  _____. CTA HEAD NECK W CONTRAST    Result Date: 3/7/2023  EXAMINATION: CTA OF THE HEAD AND NECK WITH CONTRAST 3/7/2023 6:18 pm: TECHNIQUE: CTA of the head and neck was performed with the administration of intravenous contrast. Multiplanar reformatted images are provided for review. MIP images are provided for review. Stenosis of the internal carotid arteries measured using NASCET criteria.  Automated exposure control, iterative reconstruction, and/or weight based adjustment of the mA/kV was utilized to reduce the radiation dose to as low as reasonably achievable. COMPARISON: Noncontrast CT head done same day. HISTORY: ORDERING SYSTEM PROVIDED HISTORY: Stroke Symptoms TECHNOLOGIST PROVIDED HISTORY: Has a \"code stroke\" or \"stroke alert\" been called? ->Yes Reason for exam:->Stroke Symptoms Decision Support Exception - unselect if not a suspected or confirmed emergency medical condition->Emergency Medical Condition (MA) Reason for Exam: Stroke symptoms. Numbness (Left arm and left leg feeling heavy and tingling started around 1530 today. Left arm drift and left leg drift.). FINDINGS: CTA NECK: AORTIC ARCH/ARCH VESSELS: No dissection or arterial injury. No significant stenosis of the brachiocephalic or subclavian arteries. Atherosclerosis in the visualized thoracic aorta and bilateral subclavian arteries. CAROTID ARTERIES: No dissection, arterial injury, or hemodynamically significant stenosis by NASCET criteria. Bilateral common carotid artery and carotid bulb atherosclerotic plaque. VERTEBRAL ARTERIES: No dissection, arterial injury, or significant stenosis. SOFT TISSUES: The lung apices are clear. No cervical or superior mediastinal lymphadenopathy. The larynx and pharynx are unremarkable. No acute abnormality of the salivary and thyroid glands. BONES: No acute osseous abnormality. Multilevel degenerative changes in the visualized spine are centered at C5-C6 and C6-C7. CTA HEAD: ANTERIOR CIRCULATION: No significant stenosis of the intracranial internal carotid, anterior cerebral, or middle cerebral arteries. No aneurysm. Calcifications in the bilateral intracranial internal carotid arteries without hemodynamically significant stenosis. POSTERIOR CIRCULATION: No significant stenosis of the vertebral, basilar, or posterior cerebral arteries. No aneurysm. OTHER: No dural venous sinus thrombosis on this non-dedicated study.  BRAIN: No mass effect or midline shift. No extra-axial fluid collection. The gray-white differentiation is maintained. No large vessel occlusion in the head or neck. This scan was analyzed using Viz. ai contact LVO. Identification of suspected findings is not for diagnostic use beyond notification. Viz LVO is limited to analysis of imaging data and should not be used in-lieu of full patient evaluation or relied upon to make or confirm diagnosis. EKG:   EKG interpreted by self - it shows normal sinus at 68  Old chart reviewed, EKG dated 6/26/22 is reviewed, there is not difference noted. Old study shows sinus at 79    Lab Results   Component Value Date/Time    GLUCOSE 289 03/07/2023 06:40 PM     Lab Results   Component Value Date/Time    POCGLU 290 03/07/2023 06:46 PM     BP (!) 158/72   Pulse 69   Temp 98 °F (36.7 °C) (Oral)   Resp 21   Ht 5' 5\" (1.651 m)   Wt 187 lb 1 oz (84.9 kg)   SpO2 95%   BMI 31.13 kg/m²     MEDICAL DECISION MAKING:    Principal Problem:    TIA (transient ischemic attack) -Established problem. Stable. Plan: admit, MRI brain ordered, pt/ot consulted  Active Problems:    Essential hypertension -Established problem. Stable. 158/72  Plan: Pt home BP meds reviewed and will be continued. IV Hydralazine ordered for control of extremely high blood pressures. Will monitor labs to assess Creat/K for possible complications of medications. Type 2 diabetes mellitus without complication ([facility] [address] -Established problem. Stable. Glu 289  Plan: Patient placed on controlled carbohydrate diet. Fingerstick sugars to be checked to monitor for both hypoglycemia as well as hyperglycemia. Sliding scale insulin ordered. Glucagon and dextrose ordered for hypoglycemia. Patient will be continued on home medications. Hemoglobin a1c to be ordered to assess efficacy of therapy. GERD (gastroesophageal reflux disease) -Established problem. Stable.     Plan: on ppi, to continues    Mixed hyperlipidemia  Plan: cont statin          Diagnoses as listed above, designated as new or established and plan outlined for each. Case discussed with: JOSUÉ sullivan    MDM Determination    PROBLEM  Moderate: new problem w/uncertain prognosis - tia  PLUS  moderate: 3+ tests reviewed/ordered, external notes reviewed, independent historian - cbc, bmp, ct head    OR TIME BASED  N/A - see problem based determination above           The patient is being placed in inpatient status with the expectation of requiring a hospital stay spanning at least two midnights for care and treatment of the problems noted in the problem list.  This determination is also based on thepatients comorbidities and past medical history, the severity and timing of the signs and symptoms upon presentation.     (Please note that portions of this note were completed with a voice recognition program.  Efforts were made to edit the dictations but occasionally words are mis-transcribed.)      Electronically signed by: Billy Marino MD 3/7/2023

## 2023-03-08 NOTE — ED NOTES
stroke team on IPAD to evaluate pts symptoms, per stroke team, no need for TNK at this time d/t pt stating \"I feel much better now, I have been under a lot of stress lately.  \"     Kirstin Ulloa, ALEX  03/07/23 46 Nhung Wasserman RN  03/07/23 194

## 2023-03-08 NOTE — PROGRESS NOTES
Speech Language Pathology  Facility/Department: [address]  SLP Clinical Swallow Evaluation and Speech Language Cognitive Assessment     Patient: Bhavna Peters   : 1950   MRN: [id number]      Evaluation Date: 3/8/2023      Admitting Dx: TIA (transient ischemic attack) [G45.9]  Elevated blood pressure reading [R03.0]  Cerebrovascular accident (CVA), unspecified mechanism ([facility] [address] [I63.9]  Pain: Denies                                  H&P: Bhavna Peters is a 67 y.o. female who presents for evaluation of left-sided numbness. Patient states that she had the onset of left-sided numbness, weakness around 330 today. She states that she has not had a prior weakness. Denies any difficulty speaking. Denies any difficulty swallowing. She denies any prior history of stroke. She is not on anticoagulation. She states that it was difficult to walk because of the heaviness in her leg. She denies any chest pain. She is a diabetic. Imaging:  CT Chest:   FINDINGS:   Lower Chest: Clear lung bases. MRI:  Impression   Small acute infarct right thalamus. History/Prior Level of Function:   Living Status: Home  Prior Dysphagia History: No prior dysphagia history noted per chart review or per Pt report  Prior Speech History: No prior speech language history noted per chart review or per Pt report    Reason for referral: SLP evaluation orders received due to CVA protocol  and new CVA . DYSPHAGIA BEDSIDE SWALLOW EVALUATION   Dysphagia Impressions/Dysphagia Diagnosis: Oropharyngeal Dysphagia   Pt awake/alert and verbally responsive on RA. No overt facial asymmetry noted at rest. With completion of OME, Pt demonstrates adequate oral motor strength and ROM but with mild reduced lingual coordination for alternating movements. With po trials, Pt demonstrates adequate bolus control, mastication and A-P propulsion with all textures.  Clinical symptoms of mild delayed swallow initiation with adequate laryngeal excursion via palpation and no overt signs of aspiration with any texture. Education regarding aspiration risk and precautions explained to the Pt who verbalized understanding. No further dysphagia treatment intervention is indicated at this time. Recommended Diet and Intervention:  Diet Solids Recommendation:  Regular texture diet  Liquid Consistency Recommendation: Thin liquids  Recommended form of Meds: Meds whole with water           Compensatory Swallowing Strategies:  Upright as possible with all PO intake , Small bites/sips     Oral Mechanism Exam:  []WFL [x]Mild   [] Moderate  []Severe  []To be assessed  Lingual Coordination       Patient Positioning: Upright in bed       SPEECH LANGUAGE COGNITIVE ASSESSMENT:     Speech Diagnosis:   No overt speech language or cognitive linguistic deficits noted at this time    Impressions: Pt demonstrates adequate motor speech production with no overt dysarthria noted. Auditory processing/comprehension is largely [facility] to the level of complex commands and questions. Verbal expression is largely [facility] for functional self expression, thought organization and naming. Pt is currently oriented x4. Mild reduced short term recall is noted for listed items given a delay, however, recall is easily prompted with improved accuracy. Verbal problem solving is largely [facility] for basic and complex verbal tasks. Overall speech language and cognitive linguistic function appears consistent with Pt's reported baseline function. Therefore, no further speech language treatment intervention is indicated at this time. COMPREHENSION  Auditory Comprehension: Within functional limits        EXPRESSION  Verbal Expression: Within functional limits         Pragmatics/Social Functioning: Within functional limits          MOTOR SPEECH  Motor Speech: Within functional limits        VOICE  Voice:  Within functional limits        COGNITION    Overall Orientation : Within functional limits Oriented x4    Attention: Within functional limits           Memory: Mild    Impaired Recall of New Learning     Problem Solving: Within functional limits    Able to complete simple functional tasks    Safety/Judgement: Within functional limits       Plan of care: No further follow-up indicated     Discharge Recommendations:  No further follow-up appears indicated at this time. EDUCATION:   Provided education regarding role of SLP, results of assessment, recommendations and general speech pathology plan of care. [x] Pt verbalized understanding and agreement   [] Pt requires ongoing learning   [] No evidence of comprehension     If patient discharges prior to next visit, this note will serve as discharge.      Treatment time:  Timed Code Treatment Minutes:0 min   Total Treatment time:40 min    Kenneth Plunkett [facility]#[id number]

## 2023-03-08 NOTE — CONSULTS
Stroke Team Telemedicine Consult    Asked to see this 66 yo woman for possible stroke. She notice mild weakness or heaviness of her left arm and leg at about 3:30 pm.  No other new neurological symptoms. By the time of my telemedicine evaluation her symptoms had resolved. Her PMH includes HTN, DM, CAD, peripheral neuropathy, and a possible distant TIA. She denies recent surgery or bleeding. On telemedicine exam no neurological deficits were identified. There was no facial droop or limb drift. She could walk unassisted. NIHSS = 0.    CT head reviewed, unremarkable. CTA reviewed, no LVO seen. Impression:    Possible TIA. Recommendation:    I recommended against TNK as she currently has no deficits that I could identify. Patient agreed. Please call stroke team if she should worsen neurologically. Discussed with bedside nurse.     Crystal Lopez MD  [phone]

## 2023-03-08 NOTE — CONSULTS
In patient Neurology consult        [facility]      Donnie Duran MD      Noam Garcia  1950    Date of Service: 3/8/2023    Referring Physician: Deshawn Van MD      Reason for the consult and CC: L sided weakness    HPI:   The patient is a 68y.o.  years old female with PMHx of HTN, HLD, DM 2 with peripheral neuropathy,  CAD and other medical problems. Pt reports acute onset of left arm and left leg numbness on day of admission. Degree was severe and persistent. No aggravating or relieving factors. Pt's did not have any other symptoms including facial droop, dysarthria or dysphagia. Pt was [facility] to [facility] ED and where CT head and CTA head and neck showed no acute findings. Symptoms resolved by the time of arrival to the ED. MRI brain completed this morning shows small right thalamus infarct. Pt seen in the room with spouse, reports feeling back to her baseline. She has peripheral neuropathy and states her hand and feet have numbness tingling most days. She denies headache, dysarthria, dysphagia, dizziness, double vision, or new numbness/ tingling. Constitutional:   Vitals:    03/07/23 2200 03/08/23 0513 03/08/23 0728 03/08/23 0815   BP: (!) 163/99 133/68  (!) 148/76   Pulse: 68 67  70   Resp: 20 20  18   Temp: 97.5 °F (36.4 °C) 97.6 °F (36.4 °C)  98.2 °F (36.8 °C)   TempSrc: Oral Oral  Oral   SpO2: 94% 95%  97%   Weight: 186 lb 12.8 oz (84.7 kg)  186 lb (84.4 kg)    Height: 5' 5\" (1.651 m)            I personally reviewed and updated social history, past medical history, medications, allergy, surgical history, and family history as documented in the patient's electronic health records. ROS: 10-14 ROS reviewed with the patient/nurse/family which were unremarkable except mentioned in H&P. General appearance:  Normal development and appear in no acute distress. Mental Status:   Oriented to person, place, problem, and time. Memory: Good immediate recall.   Intact remote memory  Normal attention span and concentration. Language: intact naming, repeating and fluency   Good fund of Knowledge. Aware of current events and vocabulary   Cranial Nerves:   II: Visual fields: Full. Pupils: equal, round, reactive to light, bilaterally  III,IV,VI: Extra Ocular Movements are intact. No nystagmus  V: Facial sensation is intact  VII: Facial strength and movements: intact and symmetric  IX: Normal palatal elevation and shoulder shrug  XII: Tongue movements are normal  Musculoskeletal: 5/5 in all 4 extremities. Good range of motion. No muscle atrophy. Tone: Normal tone. Reflexes: Symmetric 2+ in the arms and 2+ in the legs   Planters: Flexor bilaterally  Coordination: no pronator drift, no dysmetria with FNF and normal REM  Sensation: minimal numbness/ tingling in all extremities L>R. Gait/Posture: steady gait with normal posturing and station. Medical decision making:  Data: reviewed   LABS:   Lab Results   Component Value Date/Time     03/08/2023 05:44 AM    K 4.3 03/08/2023 05:44 AM     03/08/2023 05:44 AM    CO2 25 03/08/2023 05:44 AM    BUN 11 03/08/2023 05:44 AM    CREATININE <0.5 03/08/2023 05:44 AM    GFRAA >60 10/14/2022 02:23 AM    GFRAA >60 03/03/2013 08:32 PM    LABGLOM >60 03/08/2023 05:44 AM    GLUCOSE 183 03/08/2023 05:44 AM    MG 2.00 08/04/2022 02:26 PM    CALCIUM 9.0 03/08/2023 05:44 AM     Lab Results   Component Value Date/Time    WBC 3.9 03/08/2023 05:44 AM    RBC 4.46 03/08/2023 05:44 AM    HGB 12.6 03/08/2023 05:44 AM    HCT 37.5 03/08/2023 05:44 AM    MCV 84.1 03/08/2023 05:44 AM    RDW 13.4 03/08/2023 05:44 AM     03/08/2023 05:44 AM   No results found for: INR, PROTIME        Neuroimaging was independently reviewed by myself and discussed results with the patient  Reviewed notes from different physicians including H&P and ED notes. Reviewed lab and blood testing    Impression:    New acute right thalamus ischemic stroke.  Could be thromboembolic vs cardioembolic. CTA head and neck with no flow limiting stenosis or LVO. MRI brain showed small right thalamus infarct  Hypertension  Hyperlipidemia  Diabetes      Recommendation:    Stroke education and prevention measures discussed with patient and spouse. ECHO ordered  Monitor on tele. Continue ASA, statin. Continue home BP meds. F/u A1c, lipid panel. Improved glycemic control. SSI coverage while inpatient. PT/OT/SLP  We will follow      Thank you for referring such patient. If you have any questions regarding my consult note, please don't hesitate to call me. Garfield Rebollar, APRN - CNP    [phone]      Attending Supervising Physician's [address] Statement   I reviewed and agree with the findings and plan as documented in NP consult note   Patient was admitted with acute left-sided weakness.   Further work-up with MRI showed acute right thalamic stroke  Secondary stroke prevention  Aspirin  Statin  Echo  Diabetic control  We will follow    Electronically signed by Felipe Patel MD on 3/8/23 at 9:01 PM EST

## 2023-03-08 NOTE — CONSULTS
Patient: Joe Jeffrey  [id number]  Date: 3/8/2023      Chief Complaint: Left-sided numbness    History of Present Illness/Hospital Course:  77-year-old female with a history of asthma, CAD, diabetes, GERD, HTN, and HLD who was admitted on 3/7 with left-sided numbness. CT head was unremarkable. CTA without significant occlusion. MRI was positive for an acute right thalamic infarct. Echo is currently pending. Neurology consult is currently pending. Therapy evaluations are currently pending. Patient states she has been up to the bathroom without significant assistance and without any DME. She is hoping to be able to go home and return to work at [employer] on Monday. Prior Level of Function:  Independent    Current Level of Function:  Pending     has a past medical history of Abdominal pain, Arthritis, Asthma, Coronary artery disease involving native coronary artery of native heart with angina pectoris ([address] Diabetes mellitus ([address] GERD (gastroesophageal reflux disease), Hypertension, Mitral valve prolapse, Mixed hyperlipidemia, and Rotator cuff tear. has a past surgical history that includes Tonsillectomy; Coronary angioplasty with stent; Wrist fracture surgery (Left); and Upper gastrointestinal endoscopy (N/A, 6/29/2022). reports that she quit smoking about 20 years ago. She has never used smokeless tobacco. She reports that she does not drink alcohol and does not use drugs. family history includes Arthritis in an other family member; Diabetes in her father and mother; Heart Disease in her father; Hypertension in an other family member; Kidney Disease in her mother; Stroke in her father. REVIEW OF SYSTEMS:   CONSTITUTIONAL: negative for fevers, chills, diaphoresis, appetite change, night sweats and unexpected weight change. HEENT: negative for hearing loss, tinnitus, ear drainage, sinus pressure, nasal congestion, epistaxis and snoring.    RESPIRATORY: Negative for hemoptysis, cough, sputum production. CARDIOVASCULAR: negative for chest pain, palpitations, exertional chest pressure/discomfort, edema, syncope. GASTROINTESTINAL: negative for nausea, vomiting, diarrhea, constipation, blood in stool and abdominal pain. GENITOURINARY: negative for frequency, dysuria, urinary incontinence, decreased urine volume, and hematuria. HEMATOLOGIC/LYMPHATIC: negative for easy bruising, bleeding and lymphadenopathy. ALLERGIC/IMMUNOLOGIC: negative for recurrent infections, angioedema, anaphylaxis and drug reactions. ENDOCRINE: negative for weight changes and diabetic symptoms including polyuria, polydipsia and polyphagia. MUSCULOSKELETAL: negative for pain, joint swelling, decreased range of motion and muscle weakness. NEUROLOGICAL: negative for headaches, slurred speech. positive unilateral weakness. PSYCHIATRIC/BEHAVIORAL: negative for hallucinations, behavioral problems, confusion and agitation. All pertinent positives are noted in the HPI. Physical Examination:  Vitals: Patient Vitals for the past 24 hrs:   BP Temp Temp src Pulse Resp SpO2 Height Weight   03/08/23 0815 (!) 148/76 98.2 °F (36.8 °C) Oral 70 18 97 % -- --   03/08/23 0728 -- -- -- -- -- -- -- 186 lb (84.4 kg)   03/08/23 0513 133/68 97.6 °F (36.4 °C) Oral 67 20 95 % -- --   03/07/23 2200 (!) 163/99 97.5 °F (36.4 °C) Oral 68 20 94 % 5' 5\" (1.651 m) 186 lb 12.8 oz (84.7 kg)   03/07/23 2006 (!) 158/72 -- -- 69 21 95 % -- --   03/07/23 1930 (!) 146/66 -- -- 74 21 96 % -- --   03/07/23 1842 (!) 161/67 98 °F (36.7 °C) Oral -- -- -- -- --   03/07/23 1839 -- -- -- 93 21 97 % 5' 5\" (1.651 m) 187 lb 1 oz (84.9 kg)     Psych: Stable mood, normal judgement, normal affect. Const: No distress  Eyes: Conjunctiva noninjected, no icterus noted; pupils equal, round. HENT: Atraumatic, normocephalic; Oral mucosa moist  Neck: Trachea midline, neck supple. No thyromegaly noted.   CV: No audible murmurs  Resp: No increased WOB, no audible wheezing GI: Nondistended   Neuro: Alert, oriented, appropriate. Skin: No visible abnormalities  MSK: No joint abnormalities noted. Ext: No significant edema appreciated. No varicosities. Lab Results   Component Value Date    WBC 3.9 (L) 03/08/2023    HGB 12.6 03/08/2023    HCT 37.5 03/08/2023    MCV 84.1 03/08/2023     03/08/2023     No results found for: INR, PROTIME  Lab Results   Component Value Date    CREATININE <0.5 (L) 03/08/2023    BUN 11 03/08/2023     03/08/2023    K 4.3 03/08/2023     03/08/2023    CO2 25 03/08/2023     Lab Results   Component Value Date    ALT 19 03/08/2023    AST 20 03/08/2023    ALKPHOS 100 03/08/2023    BILITOT 0.5 03/08/2023         Most recent imaging studies revealed   EXAMINATION:   MRI OF THE BRAIN WITHOUT CONTRAST  3/8/2023 7:27 am       TECHNIQUE:   Multiplanar multisequence MRI of the brain was performed without the   administration of intravenous contrast.       COMPARISON:   CT head, CTA head 03/07/2023. HISTORY:   ORDERING SYSTEM PROVIDED HISTORY: TIA   TECHNOLOGIST PROVIDED HISTORY:   Reason for exam:->TIA   Reason for Exam: TIA       FINDINGS:   INTRACRANIAL STRUCTURES/VENTRICLES: Small acute infarct right thalamus   (series 4, image 15). No mass effect or midline shift. No evidence of an   acute intracranial hemorrhage. The ventricles and sulci are normal in size   and configuration. The sellar/suprasellar regions appear unremarkable. The   normal signal voids within the major intracranial vessels appear maintained. Mild chronic ischemic white matter changes. Mild volume loss. ORBITS: The visualized portion of the orbits demonstrate no acute abnormality. SINUSES: Paranasal sinuses are clear. Small mastoid effusions. BONES/SOFT TISSUES: The bone marrow signal intensity appears normal. The soft   tissues demonstrate no acute abnormality. Impression   Small acute infarct right thalamus.        The above laboratory data have been reviewed. The above imaging data have been reviewed. The above medical testing have been reviewed. Body mass index is 30.95 kg/m². Assessment and Plan:  Acute infarct: ASA, statin. PT/OT/SLP  HTN  HLD  CAD  DM    Dispo: Patient pending therapy evaluations. She has been up and walking to the bathroom without DME per her report. Likely will not require ARU. We will FU evals today and s/o if not functionally appropriate. Thank you for the consultation. Darci Vázquez MD 3/8/2023, 10:39 AM     * This document was created using dictation software. While all precautions were taken to ensure accuracy, errors may have occurred. Please disregard any typographical errors.

## 2023-03-08 NOTE — PROGRESS NOTES
Progress Note - Dr. Arsen Carlton - Internal Medicine  PCP: Christy Mack, [address] / [place] [phone]    Hospital Day: 1  Code Status: Full Code  Current Diet: ADULT DIET; Regular; 4 carb choices (60 gm/meal)        CC: follow up on medical issues    Subjective:   Babara Cooks is a 68 y.o. female. Pt seen and examined  Chart reviewed since last visit, labs and imaging below      All sx resolved  MRI done, reading pending      Review of Systems: (1 system for EPF, 2-9 for detailed, 10+ for comprehensive)  Constitutional: Negative for chills and fever. HENT: Negative for dental problem, nosebleeds and rhinorrhea. Eyes: Negative for photophobia and visual disturbance. Respiratory: Negative for cough, chest tightness and shortness of breath. Cardiovascular: Negative for chest pain and leg swelling. Gastrointestinal: Negative for diarrhea, nausea and vomiting. Endocrine: Negative for polydipsia and polyphagia. Genitourinary: Negative for frequency, hematuria and urgency. Musculoskeletal: Negative for back pain and myalgias. Skin: Negative for rash. Allergic/Immunologic: Negative for food allergies. Neurological: Negative for dizziness, seizures, syncope and facial asymmetry. Hematological: Negative for adenopathy. Psychiatric/Behavioral: Negative for dysphoric mood. The patient is not nervous/anxious. I have reviewed the patient's medical and social history in detail and updated the computerized patient record. To recap: She  has a past medical history of Abdominal pain, Arthritis, Asthma, Coronary artery disease involving native coronary artery of native heart with angina pectoris ([facility] [address] Diabetes mellitus ([facility] [address] GERD (gastroesophageal reflux disease), Hypertension, Mitral valve prolapse, Mixed hyperlipidemia, and Rotator cuff tear. . She  has a past surgical history that includes Tonsillectomy; Coronary angioplasty with stent;  Wrist fracture surgery (Left); and Upper gastrointestinal endoscopy (N/A, 6/29/2022). . She  reports that she quit smoking about 20 years ago. She has never used smokeless tobacco. She reports that she does not drink alcohol and does not use drugs. .        Active Hospital Problems    Diagnosis Date Noted    TIA (transient ischemic attack) [G45.9] 03/07/2023     Priority: Medium    Mixed hyperlipidemia [E78.2] 01/31/2018    GERD (gastroesophageal reflux disease) [K21.9] 01/30/2017    Essential hypertension [I10] 01/23/2016    Type 2 diabetes mellitus without complication ([facility] [W22.4] 01/23/2016       Current Facility-Administered Medications: metFORMIN (GLUCOPHAGE) tablet 500 mg, 500 mg, Oral, BID WC  losartan (COZAAR) tablet 100 mg, 100 mg, Oral, Daily  naproxen (NAPROSYN) tablet 250 mg, 250 mg, Oral, BID WC  glimepiride (AMARYL) tablet 4 mg, 4 mg, Oral, QAM AC  acetaminophen (TYLENOL) tablet 500 mg, 500 mg, Oral, 4x Daily PRN  pantoprazole (PROTONIX) injection 40 mg, 40 mg, IntraVENous, Daily  amLODIPine (NORVASC) tablet 5 mg, 5 mg, Oral, Nightly  sodium chloride flush 0.9 % injection 10 mL, 10 mL, IntraVENous, 2 times per day  sodium chloride flush 0.9 % injection 10 mL, 10 mL, IntraVENous, PRN  0.9 % sodium chloride infusion, , IntraVENous, PRN  ondansetron (ZOFRAN-ODT) disintegrating tablet 4 mg, 4 mg, Oral, Q8H PRN **OR** ondansetron (ZOFRAN) injection 4 mg, 4 mg, IntraVENous, Q6H PRN  magnesium hydroxide (MILK OF MAGNESIA) 400 MG/5ML suspension 30 mL, 30 mL, Oral, Daily PRN  enoxaparin (LOVENOX) injection 40 mg, 40 mg, SubCUTAneous, Daily  aspirin EC tablet 81 mg, 81 mg, Oral, Daily **OR** aspirin suppository 300 mg, 300 mg, Rectal, Daily  labetalol (NORMODYNE;TRANDATE) injection 10 mg, 10 mg, IntraVENous, Q10 Min PRN  hydrALAZINE (APRESOLINE) injection 10 mg, 10 mg, IntraVENous, Q6H PRN  0.9 % sodium chloride bolus, 500 mL, IntraVENous, PRN  potassium chloride (KLOR-CON M) extended release tablet 40 mEq, 40 mEq, Oral, PRN **OR** potassium bicarb-citric acid (EFFER-K) effervescent tablet 40 mEq, 40 mEq, Oral, PRN **OR** potassium chloride 10 mEq/100 mL IVPB (Peripheral Line), 10 mEq, IntraVENous, PRN  insulin lispro (HUMALOG) injection vial 0-8 Units, 0-8 Units, SubCUTAneous, TID WC  insulin lispro (HUMALOG) injection vial 0-4 Units, 0-4 Units, SubCUTAneous, Nightly  dextrose bolus 10% 125 mL, 125 mL, IntraVENous, PRN **OR** dextrose bolus 10% 250 mL, 250 mL, IntraVENous, PRN  glucagon (rDNA) injection 1 mg, 1 mg, SubCUTAneous, PRN  dextrose 10 % infusion, , IntraVENous, Continuous PRN         Objective:  /68   Pulse 67   Temp 97.6 °F (36.4 °C) (Oral)   Resp 20   Ht 5' 5\" (1.651 m)   Wt 186 lb (84.4 kg)   SpO2 95%   BMI 30.95 kg/m²      Patient Vitals for the past 24 hrs:   BP Temp Temp src Pulse Resp SpO2 Height Weight   03/08/23 0728 -- -- -- -- -- -- -- 186 lb (84.4 kg)   03/08/23 0513 133/68 97.6 °F (36.4 °C) Oral 67 20 95 % -- --   03/07/23 2200 (!) 163/99 97.5 °F (36.4 °C) Oral 68 20 94 % 5' 5\" (1.651 m) 186 lb 12.8 oz (84.7 kg)   03/07/23 2006 (!) 158/72 -- -- 69 21 95 % -- --   03/07/23 1930 (!) 146/66 -- -- 74 21 96 % -- --   03/07/23 1842 (!) 161/67 98 °F (36.7 °C) Oral -- -- -- -- --   03/07/23 1839 -- -- -- 93 21 97 % 5' 5\" (1.651 m) 187 lb 1 oz (84.9 kg)     Patient Vitals for the past 96 hrs (Last 3 readings):   Weight   03/08/23 0728 186 lb (84.4 kg)   03/07/23 2200 186 lb 12.8 oz (84.7 kg)   03/07/23 1839 187 lb 1 oz (84.9 kg)         No intake or output data in the 24 hours ending 03/08/23 1358      Physical Exam: (2-7 system for EPF/Detailed, ?8 for Comprehensive)  /68   Pulse 67   Temp 97.6 °F (36.4 °C) (Oral)   Resp 20   Ht 5' 5\" (1.651 m)   Wt 186 lb (84.4 kg)   SpO2 95%   BMI 30.95 kg/m²   Constitutional: vitals as above: alert, appears stated age and cooperative    Psychiatric: normal insight and judgment, oriented to person, place, time, and general circumstances    Head: Normocephalic, without obvious abnormality, atraumatic    Eyes:lids and lashes normal, conjunctivae and sclerae normal and pupils equal, round, reactive to light and accomodation    EMNT: external ears normal, nares midline    Neck: no carotid bruit, supple, symmetrical, trachea midline and thyroid not enlarged, symmetric, no tenderness/mass/nodules     Respiratory: clear to auscultation and percussion bilaterally with normal respiratory effort    Cardiovascular: normal rate, regular rhythm, normal S1 and S2 and no murmurs    Gastrointestinal: soft, non-tender, non-distended, normal bowel sounds, no masses or organomegaly    Extremities: no clubbing, no edema    Skin:No rashes or nodules noted. Neurologic:negative         Labs:  Lab Results   Component Value Date    WBC 3.9 (L) 03/08/2023    HGB 12.6 03/08/2023    HCT 37.5 03/08/2023     03/08/2023    CHOL 153 01/31/2017    TRIG 153 (H) 01/31/2017    HDL 25 (L) 01/31/2017    ALT 19 03/08/2023    AST 20 03/08/2023     03/08/2023    K 4.3 03/08/2023     03/08/2023    CREATININE <0.5 (L) 03/08/2023    BUN 11 03/08/2023    CO2 25 03/08/2023    LABA1C 7.5 01/31/2017    LABMICR YES 10/14/2022     Lab Results   Component Value Date    CKTOTAL 53 03/04/2013    CKMB 1.06 03/04/2013    TROPONINI <0.01 03/07/2023       Recent Imaging Results are Reviewed:  CT HEAD WO CONTRAST    Result Date: 3/7/2023  EXAMINATION: CT OF THE HEAD WITHOUT CONTRAST,  3/7/2023 6:16 pm TECHNIQUE: CT of the head was performed without the administration of intravenous contrast. Automated exposure control, iterative reconstruction, and/or weight based adjustment of the mA/kV was utilized to reduce the radiation dose to as low as reasonably achievable. COMPARISON: 01/14/2014 HISTORY: ORDERING SYSTEM PROVIDED HISTORY:  Stroke symptoms TECHNOLOGIST PROVIDED HISTORY: Has a \"code stroke\" or \"stroke alert\" been called?   Yes Reason for Exam:  Stroke symptoms Decision Support Exception - unselect if not a suspected or confirmed emergency medical condition->Emergency Medical Condition (MA) Reason for Exam:  Stroke symptoms. Numbness (Left arm and left leg feeling heavy and tingling started around 1530 today. Left arm drift and left leg drift). FINDINGS: BRAIN/VENTRICLES: There is no CT evidence of acute intracranial hemorrhage, mass or mass effect. Cerebral atrophy is noted. The fourth ventricle is in the midline. The basilar cisterns are preserved. There are no abnormal extra-axial collections. ORBITS: The visualized portion of the orbits demonstrate no acute abnormality. SINUSES: The visualized paranasal sinuses and mastoid air cells demonstrate no acute abnormality. SOFT TISSUES/SKULL:  No acute abnormality of the visualized skull or soft tissues. No acute intracranial abnormality. Mild cerebral atrophy. This report was discussed with  _____. CTA HEAD NECK W CONTRAST    Result Date: 3/7/2023  EXAMINATION: CTA OF THE HEAD AND NECK WITH CONTRAST 3/7/2023 6:18 pm: TECHNIQUE: CTA of the head and neck was performed with the administration of intravenous contrast. Multiplanar reformatted images are provided for review. MIP images are provided for review. Stenosis of the internal carotid arteries measured using NASCET criteria. Automated exposure control, iterative reconstruction, and/or weight based adjustment of the mA/kV was utilized to reduce the radiation dose to as low as reasonably achievable. COMPARISON: Noncontrast CT head done same day. HISTORY: ORDERING SYSTEM PROVIDED HISTORY: Stroke Symptoms TECHNOLOGIST PROVIDED HISTORY: Has a \"code stroke\" or \"stroke alert\" been called? ->Yes Reason for exam:->Stroke Symptoms Decision Support Exception - unselect if not a suspected or confirmed emergency medical condition->Emergency Medical Condition (MA) Reason for Exam: Stroke symptoms. Numbness (Left arm and left leg feeling heavy and tingling started around 1530 today.  Left arm drift and left leg drift.). FINDINGS: CTA NECK: AORTIC ARCH/ARCH VESSELS: No dissection or arterial injury. No significant stenosis of the brachiocephalic or subclavian arteries. Atherosclerosis in the visualized thoracic aorta and bilateral subclavian arteries. CAROTID ARTERIES: No dissection, arterial injury, or hemodynamically significant stenosis by NASCET criteria. Bilateral common carotid artery and carotid bulb atherosclerotic plaque. VERTEBRAL ARTERIES: No dissection, arterial injury, or significant stenosis. SOFT TISSUES: The lung apices are clear. No cervical or superior mediastinal lymphadenopathy. The larynx and pharynx are unremarkable. No acute abnormality of the salivary and thyroid glands. BONES: No acute osseous abnormality. Multilevel degenerative changes in the visualized spine are centered at C5-C6 and C6-C7. CTA HEAD: ANTERIOR CIRCULATION: No significant stenosis of the intracranial internal carotid, anterior cerebral, or middle cerebral arteries. No aneurysm. Calcifications in the bilateral intracranial internal carotid arteries without hemodynamically significant stenosis. POSTERIOR CIRCULATION: No significant stenosis of the vertebral, basilar, or posterior cerebral arteries. No aneurysm. OTHER: No dural venous sinus thrombosis on this non-dedicated study. BRAIN: No mass effect or midline shift. No extra-axial fluid collection. The gray-white differentiation is maintained. No large vessel occlusion in the head or neck. This scan was analyzed using Viz. ai contact LVO. Identification of suspected findings is not for diagnostic use beyond notification. Viz LVO is limited to analysis of imaging data and should not be used in-lieu of full patient evaluation or relied upon to make or confirm diagnosis.        Lab Results   Component Value Date/Time    GLUCOSE 183 03/08/2023 05:44 AM     Lab Results   Component Value Date/Time    POCGLU 166 03/08/2023 07:59 AM     /68   Pulse 67   Temp 97.6 °F (36.4 °C) (Oral)   Resp 20   Ht 5' 5\" (1.651 m)   Wt 186 lb (84.4 kg)   SpO2 95%   BMI 30.95 kg/m²     Assessment and Plan:  Principal Problem:    TIA (transient ischemic attack) -Established problem. Stable. Plan: await MRI. Await therapy  Active Problems:    Essential hypertension -Established problem. Stable. 133/68  Plan: Continue medications. Continue to check BP q shift. CBC and BMP ordered to monitor for disease progression, medication side effect. Type 2 diabetes mellitus without complication (HCC)  Plan: Continue on [address] diet, home medications. CBC and BMP ordered to monitor sugars and for other medication complications. Fingerstick glucose ordered to check for alterations in levels throughout day. Sliding scale insulin ordered to cover fingerstick levels. GERD (gastroesophageal reflux disease) -Established problem. Stable. Plan: cont on ppi    Mixed hyperlipidemia  Plan: Continue present orders/plan.        Case discussed with:  Tests ordered/reviewed: cbc, bmp, mri          (Please note that portions of this note were completed with a voice recognition program.  Efforts were made to edit the dictations but occasionally words are mis-transcribed.)        Naga Zeng MD  3/8/2023

## 2023-03-09 VITALS
HEIGHT: 65 IN | DIASTOLIC BLOOD PRESSURE: 82 MMHG | OXYGEN SATURATION: 98 % | HEART RATE: 79 BPM | BODY MASS INDEX: 30.97 KG/M2 | TEMPERATURE: 97.9 F | RESPIRATION RATE: 16 BRPM | WEIGHT: 185.9 LBS | SYSTOLIC BLOOD PRESSURE: 158 MMHG

## 2023-03-09 LAB
ANION GAP SERPL CALCULATED.3IONS-SCNC: 7 MMOL/L (ref 3–16)
BASOPHILS ABSOLUTE: 0 K/UL (ref 0–0.2)
BASOPHILS RELATIVE PERCENT: 0.6 %
BUN BLDV-MCNC: 16 MG/DL (ref 7–20)
CALCIUM SERPL-MCNC: 9.1 MG/DL (ref 8.3–10.6)
CHLORIDE BLD-SCNC: 107 MMOL/L (ref 99–110)
CO2: 27 MMOL/L (ref 21–32)
CREAT SERPL-MCNC: 0.6 MG/DL (ref 0.6–1.2)
EOSINOPHILS ABSOLUTE: 0.3 K/UL (ref 0–0.6)
EOSINOPHILS RELATIVE PERCENT: 6.6 %
GFR SERPL CREATININE-BSD FRML MDRD: >60 ML/MIN/{1.73_M2}
GLUCOSE BLD-MCNC: 168 MG/DL (ref 70–99)
GLUCOSE BLD-MCNC: 176 MG/DL (ref 70–99)
HCT VFR BLD CALC: 37.4 % (ref 36–48)
HEMOGLOBIN: 12.5 G/DL (ref 12–16)
LV EF: 55 %
LVEF MODALITY: NORMAL
LYMPHOCYTES ABSOLUTE: 1.1 K/UL (ref 1–5.1)
LYMPHOCYTES RELATIVE PERCENT: 23.4 %
MCH RBC QN AUTO: 28.2 PG (ref 26–34)
MCHC RBC AUTO-ENTMCNC: 33.5 G/DL (ref 31–36)
MCV RBC AUTO: 84.4 FL (ref 80–100)
MONOCYTES ABSOLUTE: 0.5 K/UL (ref 0–1.3)
MONOCYTES RELATIVE PERCENT: 9.8 %
NEUTROPHILS ABSOLUTE: 2.7 K/UL (ref 1.7–7.7)
NEUTROPHILS RELATIVE PERCENT: 59.6 %
PDW BLD-RTO: 13.5 % (ref 12.4–15.4)
PERFORMED ON: ABNORMAL
PLATELET # BLD: 198 K/UL (ref 135–450)
PMV BLD AUTO: 7.1 FL (ref 5–10.5)
POTASSIUM SERPL-SCNC: 4.5 MMOL/L (ref 3.5–5.1)
RBC # BLD: 4.44 M/UL (ref 4–5.2)
SODIUM BLD-SCNC: 141 MMOL/L (ref 136–145)
WBC # BLD: 4.6 K/UL (ref 4–11)

## 2023-03-09 PROCEDURE — 2580000003 HC RX 258: Performed by: INTERNAL MEDICINE

## 2023-03-09 PROCEDURE — 93306 TTE W/DOPPLER COMPLETE: CPT

## 2023-03-09 PROCEDURE — 85025 COMPLETE CBC W/AUTO DIFF WBC: CPT

## 2023-03-09 PROCEDURE — C9113 INJ PANTOPRAZOLE SODIUM, VIA: HCPCS | Performed by: INTERNAL MEDICINE

## 2023-03-09 PROCEDURE — 99233 SBSQ HOSP IP/OBS HIGH 50: CPT | Performed by: PSYCHIATRY & NEUROLOGY

## 2023-03-09 PROCEDURE — 80048 BASIC METABOLIC PNL TOTAL CA: CPT

## 2023-03-09 PROCEDURE — 6370000000 HC RX 637 (ALT 250 FOR IP): Performed by: INTERNAL MEDICINE

## 2023-03-09 PROCEDURE — 6360000002 HC RX W HCPCS: Performed by: INTERNAL MEDICINE

## 2023-03-09 PROCEDURE — 94760 N-INVAS EAR/PLS OXIMETRY 1: CPT

## 2023-03-09 PROCEDURE — 36415 COLL VENOUS BLD VENIPUNCTURE: CPT

## 2023-03-09 RX ORDER — ASPIRIN 325 MG
325 TABLET, DELAYED RELEASE (ENTERIC COATED) ORAL DAILY
Qty: 90 TABLET | Refills: 3 | Status: SHIPPED | OUTPATIENT
Start: 2023-03-09 | End: 2024-03-08

## 2023-03-09 RX ORDER — ATORVASTATIN CALCIUM 40 MG/1
40 TABLET, FILM COATED ORAL DAILY
Qty: 90 TABLET | Refills: 1 | Status: SHIPPED | OUTPATIENT
Start: 2023-03-09

## 2023-03-09 RX ADMIN — GLIMEPIRIDE 4 MG: 2 TABLET ORAL at 10:12

## 2023-03-09 RX ADMIN — ENOXAPARIN SODIUM 40 MG: 100 INJECTION SUBCUTANEOUS at 10:13

## 2023-03-09 RX ADMIN — PANTOPRAZOLE SODIUM 40 MG: 40 INJECTION, POWDER, FOR SOLUTION INTRAVENOUS at 10:13

## 2023-03-09 RX ADMIN — LOSARTAN POTASSIUM 100 MG: 100 TABLET, FILM COATED ORAL at 10:12

## 2023-03-09 RX ADMIN — NAPROXEN 250 MG: 250 TABLET ORAL at 10:22

## 2023-03-09 RX ADMIN — SODIUM CHLORIDE, PRESERVATIVE FREE 10 ML: 5 INJECTION INTRAVENOUS at 10:13

## 2023-03-09 RX ADMIN — METFORMIN HYDROCHLORIDE 500 MG: 500 TABLET ORAL at 10:13

## 2023-03-09 RX ADMIN — ASPIRIN 81 MG: 81 TABLET, COATED ORAL at 10:12

## 2023-03-09 NOTE — CARE COORDINATION
Patient discharged 3/9/2023 to home   All discharge needs met per case management     Meli Pino RN, BSN  [phone]

## 2023-03-09 NOTE — CONSULTS
Becky Gildardo  Neurology Follow-up  [facility]    Date of Service: 3/9/2023    Subjective:   CC: Follow up today regarding: Left sided weakness    Events noted. Chart and lab reviewed. Pt seen with spouse at the bedside. No new complaint, reports feeling back to her baseline. Today, she denies headache, dysarthria, dysphagia and no new paresthesia. ROS : A 10-12 system review obtained and updated today and is unremarkable except as mentioned  in my interval history. Past medical history, social history, medication and family history reviewed. Objective:  Exam:   Constitutional:   Vitals:    03/08/23 1930 03/09/23 0500 03/09/23 0715 03/09/23 0808   BP: (!) 159/75 (!) 144/76 (!) 155/78    Pulse: 71 65 73    Resp: 18 18 16    Temp: 97.7 °F (36.5 °C) 97.4 °F (36.3 °C) 97.7 °F (36.5 °C)    TempSrc: Oral Oral Oral    SpO2: 95% 93% 95%    Weight:    185 lb 14.4 oz (84.3 kg)   Height:         General appearance:  Normal development and appear in no acute distress. Mental Status:   Oriented to person, place, problem, and time. Memory: Good immediate recall. Intact remote memory  Normal attention span and concentration. Language: intact naming, repeating and fluency   Good fund of Knowledge. Cranial Nerves:   II:   Pupils: equal, round, reactive to light  III,IV,VI: Extra Ocular Movements are intact. No nystagmus  V: Facial sensation is intact  VII: Facial strength and movements: intact and symmetric  XII: Tongue movements are normal  Musculoskeletal: 5/5 in all 4 extremities. Tone: Normal tone. Reflexes: Symmetric 2+ in both arms and legs.   Coordination: no pronator drift, no dysmetria with FNF  Sensation: decreased, no change  Gait/Posture: steady gait        Data:  LABS:   Lab Results   Component Value Date/Time     03/09/2023 04:32 AM    K 4.5 03/09/2023 04:32 AM    K 4.3 03/08/2023 05:44 AM     03/09/2023 04:32 AM    CO2 27 03/09/2023 04:32 AM    BUN 16 03/09/2023 04:32 AM    CREATININE 0.6 03/09/2023 04:32 AM    GFRAA >60 10/14/2022 02:23 AM    GFRAA >60 03/03/2013 08:32 PM    LABGLOM >60 03/09/2023 04:32 AM    GLUCOSE 168 03/09/2023 04:32 AM    MG 2.00 08/04/2022 02:26 PM    CALCIUM 9.1 03/09/2023 04:32 AM     Lab Results   Component Value Date/Time    WBC 4.6 03/09/2023 04:32 AM    RBC 4.44 03/09/2023 04:32 AM    HGB 12.5 03/09/2023 04:32 AM    HCT 37.4 03/09/2023 04:32 AM    MCV 84.4 03/09/2023 04:32 AM    RDW 13.5 03/09/2023 04:32 AM     03/09/2023 04:32 AM   No results found for: INR, PROTIME        Neuroimaging was independently reviewed by me and discussed results with the patient  I reviewed blood testing and other test results and discussed results with the patient      Impression:    New acute right thalamus ischemic stroke. Could be thromboembolic vs cardioembolic. CTA head and neck with no flow limiting stenosis or LVO. MRI brain showed small right thalamus infarct  Hypertension  Hyperlipidemia  Diabetes    Recommendation    ECHO pending  Monitor on tele. Continue ASA, statin. Continue home BP meds. F/u A1c, lipid panel. Improved glycemic control. SSI coverage while inpatient. PT/OT/SLP  Will need event monitor for 4 weeks, can be arranged as outpatient.    Can be discharged from neurology when medically stable    Patient's instructions:    Discussed the following stroke prevention goals with the patient:     AP therapy: Risk and benefit, side effect and possible bleeding and the need to be consistent with AP therapy      Blood Pressure:  Goal - reduce BP 10/5 from baseline for all patients   History of hypertension: goal BP < 140/90  History of diabetes or renal disease: goal BP < 130/80  No history of hypertension: goal BP < 120/80     Dyslipidemia:  Atherosclerotic Stroke or TIA: LDL goal < 70mg/dl or 50% reduction in LDL from baseline  Symptomatic atherosclerotic cardiovascular disease and ? 76yo: high-intensity statin  Symptomatic atherosclerotic cardiovascular disease and > 76yo: moderate-intensity statin  Medications:   High-intensity statin: atorvastatin 40-80mg, rosuvastatin 20-40mg  Moderate-intensity statin: atorvastatin 10-20mg, rosuvastatin 5-10mg, simvastatin 20-40mg, pravastatin 40-80mg, lovastatin 40mg, fluvastatin 40mg bid (XL 80mg daily), pitavastatin 2-4mg  Side effects from statin were discussed and addressed the need to follow LFT and CK if necessary with PCP     Diabetes Mellitus:   Goal - HbA1c < 7%     Cigarette smoking:  Goal - complete cessation     Alcohol Consumption:  Men - two or fewer drinks per day  Non-pregnant women - one or fewer drinks per day     Physical Activity:  Goal - at least 30 minutes of moderate intensity physical exercise 1-3 times per week     Diet:  Low-fat, low-sodium, and Mediterranean or Dietary Approaches to Stop Hypertension (DASH) or Diabetic Diet when applicable     Obesity:  Goal BMI 18.5-25 kg/m2         MAXIM Durant

## 2023-03-09 NOTE — PLAN OF CARE
Problem: Discharge Planning  Goal: Discharge to home or other facility with appropriate resources  Outcome: Progressing     Problem: Pain  Goal: Verbalizes/displays adequate comfort level or baseline comfort level  Outcome: Progressing     Problem: Safety - Adult  Goal: Free from fall injury  Outcome: Progressing     Problem: Neurosensory - Adult  Goal: Achieves stable or improved neurological status  Outcome: Progressing     Problem: Metabolic/Fluid and Electrolytes - Adult  Goal: Electrolytes maintained within normal limits  Outcome: Progressing  Goal: Hemodynamic stability and optimal renal function maintained  Outcome: Progressing     Problem: Hematologic - Adult  Goal: Maintains hematologic stability  Outcome: Progressing

## 2023-03-09 NOTE — PROGRESS NOTES
Progress Note - Dr. Woody Ribera - Internal Medicine  PCP: Nigel Valadez, [address] [facility] [address] / Radha Lindquist [phone]    Hospital Day: 2  Code Status: Full Code  Current Diet: ADULT DIET; Regular; 4 carb choices (60 gm/meal)        CC: follow up on medical issues    Subjective:   Zenaida Kaminski is a 68 y.o. female. Pt seen and examined  Chart reviewed since last visit, labs and imaging below      All sx resolved  MRI brain reviewed by self - acute cva  ECHO being done this am      Review of Systems: (1 system for EPF, 2-9 for detailed, 10+ for comprehensive)  Constitutional: Negative for chills and fever. HENT: Negative for dental problem, nosebleeds and rhinorrhea. Eyes: Negative for photophobia and visual disturbance. Respiratory: Negative for cough, chest tightness and shortness of breath. Cardiovascular: Negative for chest pain and leg swelling. Gastrointestinal: Negative for diarrhea, nausea and vomiting. Endocrine: Negative for polydipsia and polyphagia. Genitourinary: Negative for frequency, hematuria and urgency. Musculoskeletal: Negative for back pain and myalgias. Skin: Negative for rash. Allergic/Immunologic: Negative for food allergies. Neurological: Negative for dizziness, seizures, syncope and facial asymmetry. Hematological: Negative for adenopathy. Psychiatric/Behavioral: Negative for dysphoric mood. The patient is not nervous/anxious. I have reviewed the patient's medical and social history in detail and updated the computerized patient record. To recap: She  has a past medical history of Abdominal pain, Arthritis, Asthma, Coronary artery disease involving native coronary artery of native heart with angina pectoris ([facility] [address] Diabetes mellitus ([facility] [address] GERD (gastroesophageal reflux disease), Hypertension, Mitral valve prolapse, Mixed hyperlipidemia, and Rotator cuff tear. . She  has a past surgical history that includes Tonsillectomy; Coronary angioplasty with stent; Wrist fracture surgery (Left); and Upper gastrointestinal endoscopy (N/A, 6/29/2022). . She  reports that she quit smoking about 20 years ago. She has never used smokeless tobacco. She reports that she does not drink alcohol and does not use drugs. .        Active Hospital Problems    Diagnosis Date Noted    TIA (transient ischemic attack) [G45.9] 03/07/2023     Priority: Medium    Mixed hyperlipidemia [E78.2] 01/31/2018    GERD (gastroesophageal reflux disease) [K21.9] 01/30/2017    Essential hypertension [I10] 01/23/2016    [facility] [L20.4] 01/23/2016       Current Facility-Administered Medications: perflutren lipid microspheres (DEFINITY) injection 1.5 mL, 1.5 mL, IntraVENous, ONCE PRN  metFORMIN (GLUCOPHAGE) tablet 500 mg, 500 mg, Oral, BID WC  losartan (COZAAR) tablet 100 mg, 100 mg, Oral, Daily  naproxen (NAPROSYN) tablet 250 mg, 250 mg, Oral, BID WC  glimepiride (AMARYL) tablet 4 mg, 4 mg, Oral, QAM AC  acetaminophen (TYLENOL) tablet 500 mg, 500 mg, Oral, 4x Daily PRN  pantoprazole (PROTONIX) injection 40 mg, 40 mg, IntraVENous, Daily  amLODIPine (NORVASC) tablet 5 mg, 5 mg, Oral, Nightly  sodium chloride flush 0.9 % injection 10 mL, 10 mL, IntraVENous, 2 times per day  sodium chloride flush 0.9 % injection 10 mL, 10 mL, IntraVENous, PRN  0.9 % sodium chloride infusion, , IntraVENous, PRN  ondansetron (ZOFRAN-ODT) disintegrating tablet 4 mg, 4 mg, Oral, Q8H PRN **OR** ondansetron (ZOFRAN) injection 4 mg, 4 mg, IntraVENous, Q6H PRN  magnesium hydroxide (MILK OF MAGNESIA) 400 MG/5ML suspension 30 mL, 30 mL, Oral, Daily PRN  enoxaparin (LOVENOX) injection 40 mg, 40 mg, SubCUTAneous, Daily  aspirin EC tablet 81 mg, 81 mg, Oral, Daily **OR** aspirin suppository 300 mg, 300 mg, Rectal, Daily  labetalol (NORMODYNE;TRANDATE) injection 10 mg, 10 mg, IntraVENous, Q10 Min PRN  hydrALAZINE (APRESOLINE) injection 10 mg, 10 mg, IntraVENous, Q6H PRN  0.9 % sodium chloride bolus, 500 mL, IntraVENous, PRN  potassium chloride (KLOR-CON M) extended release tablet 40 mEq, 40 mEq, Oral, PRN **OR** potassium bicarb-citric acid (EFFER-K) effervescent tablet 40 mEq, 40 mEq, Oral, PRN **OR** potassium chloride 10 mEq/100 mL IVPB (Peripheral Line), 10 mEq, IntraVENous, PRN  insulin lispro (HUMALOG) injection vial 0-8 Units, 0-8 Units, SubCUTAneous, TID WC  insulin lispro (HUMALOG) injection vial 0-4 Units, 0-4 Units, SubCUTAneous, Nightly  dextrose bolus 10% 125 mL, 125 mL, IntraVENous, PRN **OR** dextrose bolus 10% 250 mL, 250 mL, IntraVENous, PRN  glucagon (rDNA) injection 1 mg, 1 mg, SubCUTAneous, PRN  dextrose 10 % infusion, , IntraVENous, Continuous PRN         Objective:  BP (!) 155/78   Pulse 73   Temp 97.7 °F (36.5 °C) (Oral)   Resp 16   Ht 5' 5\" (1.651 m)   Wt 185 lb 14.4 oz (84.3 kg)   SpO2 95%   BMI 30.94 kg/m²      Patient Vitals for the past 24 hrs:   BP Temp Temp src Pulse Resp SpO2 Weight   03/09/23 0808 -- -- -- -- -- -- 185 lb 14.4 oz (84.3 kg)   03/09/23 0715 (!) 155/78 97.7 °F (36.5 °C) Oral 73 16 95 % --   03/09/23 0500 (!) 144/76 97.4 °F (36.3 °C) Oral 65 18 93 % --   03/08/23 1930 (!) 159/75 97.7 °F (36.5 °C) Oral 71 18 95 % --   03/08/23 1615 138/77 98.4 °F (36.9 °C) Oral 74 18 95 % --   03/08/23 1215 137/74 98.1 °F (36.7 °C) Oral 76 18 95 % --       Patient Vitals for the past 96 hrs (Last 3 readings):   Weight   03/09/23 0808 185 lb 14.4 oz (84.3 kg)   03/08/23 0728 186 lb (84.4 kg)   03/07/23 2200 186 lb 12.8 oz (84.7 kg)             Intake/Output Summary (Last 24 hours) at 3/9/2023 0931  Last data filed at 3/8/2023 1815  Gross per 24 hour   Intake 240 ml   Output --   Net 240 ml         Physical Exam: (2-7 system for EPF/Detailed, ?8 for Comprehensive)  BP (!) 155/78   Pulse 73   Temp 97.7 °F (36.5 °C) (Oral)   Resp 16   Ht 5' 5\" (1.651 m)   Wt 185 lb 14.4 oz (84.3 kg)   SpO2 95%   BMI 30.94 kg/m²   Constitutional: vitals as above: alert, appears stated age and cooperative    Psychiatric: normal insight and judgment, oriented to person, place, time, and general circumstances    Head: Normocephalic, without obvious abnormality, atraumatic    Eyes:lids and lashes normal, conjunctivae and sclerae normal and pupils equal, round, reactive to light and accomodation    EMNT: external ears normal, nares midline    Neck: no carotid bruit, supple, symmetrical, trachea midline and thyroid not enlarged, symmetric, no tenderness/mass/nodules     Respiratory: clear to auscultation and percussion bilaterally with normal respiratory effort    Cardiovascular: normal rate, regular rhythm, normal S1 and S2 and no murmurs    Gastrointestinal: soft, non-tender, non-distended, normal bowel sounds, no masses or organomegaly    Extremities: no clubbing, no edema    Skin:No rashes or nodules noted. Neurologic:negative         Labs:  Lab Results   Component Value Date    WBC 4.6 03/09/2023    HGB 12.5 03/09/2023    HCT 37.4 03/09/2023     03/09/2023    CHOL 165 03/08/2023    TRIG 126 03/08/2023    HDL 35 (L) 03/08/2023    ALT 19 03/08/2023    AST 20 03/08/2023     03/09/2023    K 4.5 03/09/2023     03/09/2023    CREATININE 0.6 03/09/2023    BUN 16 03/09/2023    CO2 27 03/09/2023    LABA1C 6.3 03/08/2023    LABMICR YES 10/14/2022     Lab Results   Component Value Date    CKTOTAL 53 03/04/2013    CKMB 1.06 03/04/2013    TROPONINI <0.01 03/07/2023       Recent Imaging Results are Reviewed:  CT HEAD WO CONTRAST    Result Date: 3/7/2023  EXAMINATION: CT OF THE HEAD WITHOUT CONTRAST,  3/7/2023 6:16 pm TECHNIQUE: CT of the head was performed without the administration of intravenous contrast. Automated exposure control, iterative reconstruction, and/or weight based adjustment of the mA/kV was utilized to reduce the radiation dose to as low as reasonably achievable.  COMPARISON: 01/14/2014 HISTORY: ORDERING SYSTEM PROVIDED HISTORY:  Stroke symptoms TECHNOLOGIST PROVIDED HISTORY: Has a \"code stroke\" or \"stroke alert\" been called? Yes Reason for Exam:  Stroke symptoms Decision Support Exception - unselect if not a suspected or confirmed emergency medical condition->Emergency Medical Condition (MA) Reason for Exam:  Stroke symptoms. Numbness (Left arm and left leg feeling heavy and tingling started around 1530 today. Left arm drift and left leg drift). FINDINGS: BRAIN/VENTRICLES: There is no CT evidence of acute intracranial hemorrhage, mass or mass effect. Cerebral atrophy is noted. The fourth ventricle is in the midline. The basilar cisterns are preserved. There are no abnormal extra-axial collections. ORBITS: The visualized portion of the orbits demonstrate no acute abnormality. SINUSES: The visualized paranasal sinuses and mastoid air cells demonstrate no acute abnormality. SOFT TISSUES/SKULL:  No acute abnormality of the visualized skull or soft tissues. No acute intracranial abnormality. Mild cerebral atrophy. This report was discussed with  _____. CTA HEAD NECK W CONTRAST    Result Date: 3/7/2023  EXAMINATION: CTA OF THE HEAD AND NECK WITH CONTRAST 3/7/2023 6:18 pm: TECHNIQUE: CTA of the head and neck was performed with the administration of intravenous contrast. Multiplanar reformatted images are provided for review. MIP images are provided for review. Stenosis of the internal carotid arteries measured using NASCET criteria. Automated exposure control, iterative reconstruction, and/or weight based adjustment of the mA/kV was utilized to reduce the radiation dose to as low as reasonably achievable. COMPARISON: Noncontrast CT head done same day. HISTORY: ORDERING SYSTEM PROVIDED HISTORY: Stroke Symptoms TECHNOLOGIST PROVIDED HISTORY: Has a \"code stroke\" or \"stroke alert\" been called? ->Yes Reason for exam:->Stroke Symptoms Decision Support Exception - unselect if not a suspected or confirmed emergency medical condition->Emergency Medical Condition (MA) Reason for Exam: Stroke symptoms. Numbness (Left arm and left leg feeling heavy and tingling started around 1530 today. Left arm drift and left leg drift.). FINDINGS: CTA NECK: AORTIC ARCH/ARCH VESSELS: No dissection or arterial injury. No significant stenosis of the brachiocephalic or subclavian arteries. Atherosclerosis in the visualized thoracic aorta and bilateral subclavian arteries. CAROTID ARTERIES: No dissection, arterial injury, or hemodynamically significant stenosis by NASCET criteria. Bilateral common carotid artery and carotid bulb atherosclerotic plaque. VERTEBRAL ARTERIES: No dissection, arterial injury, or significant stenosis. SOFT TISSUES: The lung apices are clear. No cervical or superior mediastinal lymphadenopathy. The larynx and pharynx are unremarkable. No acute abnormality of the salivary and thyroid glands. BONES: No acute osseous abnormality. Multilevel degenerative changes in the visualized spine are centered at C5-C6 and C6-C7. CTA HEAD: ANTERIOR CIRCULATION: No significant stenosis of the intracranial internal carotid, anterior cerebral, or middle cerebral arteries. No aneurysm. Calcifications in the bilateral intracranial internal carotid arteries without hemodynamically significant stenosis. POSTERIOR CIRCULATION: No significant stenosis of the vertebral, basilar, or posterior cerebral arteries. No aneurysm. OTHER: No dural venous sinus thrombosis on this non-dedicated study. BRAIN: No mass effect or midline shift. No extra-axial fluid collection. The gray-white differentiation is maintained. No large vessel occlusion in the head or neck. This scan was analyzed using Viz. ai contact LVO. Identification of suspected findings is not for diagnostic use beyond notification. Viz LVO is limited to analysis of imaging data and should not be used in-lieu of full patient evaluation or relied upon to make or confirm diagnosis.        Lab Results   Component Value Date/Time    GLUCOSE 168 03/09/2023 04:32 AM     Lab Results   Component Value Date/Time    POCGLU 154 03/08/2023 09:03 PM     BP (!) 155/78   Pulse 73   Temp 97.7 °F (36.5 °C) (Oral)   Resp 16   Ht 5' 5\" (1.651 m)   Wt 185 lb 14.4 oz (84.3 kg)   SpO2 95%   BMI 30.94 kg/m²     Assessment and Plan:  Principal Problem:    TIA (transient ischemic attack) -Established problem. Stable. Plan: await MRI. Await therapy  Active Problems:    Essential hypertension -Established problem. Stable. 155/78  Plan: Continue medications. Continue to check BP q shift. CBC and BMP ordered to monitor for disease progression, medication side effect. Type 2 diabetes mellitus without complication ([address] -Established problem. Stable. Glu 168  Plan: Continue on CCC diet, home medications. CBC and BMP ordered to monitor sugars and for other medication complications. Fingerstick glucose ordered to check for alterations in levels throughout day. Sliding scale insulin ordered to cover fingerstick levels. GERD (gastroesophageal reflux disease) -Established problem. Stable. Plan: cont on ppi    Mixed hyperlipidemia  Plan: Continue present orders/plan. Case discussed with:  Tests ordered/reviewed: cbc, bmp, echo      Disp - home if echo ok.  Will need hi dose statin, ASA          (Please note that portions of this note were completed with a voice recognition program.  Efforts were made to edit the dictations but occasionally words are mis-transcribed.)        Pepper Gomez MD  3/9/2023

## 2023-03-09 NOTE — PROGRESS NOTES
Bhavna Peters  Neurology Follow-up  [facility]    Date of Service: 3/9/2023    Subjective:   CC: Follow up today regarding: Acute ischemic stroke    Events noted. Chart and lab reviewed. The patient denies any new symptoms today. Was not consistent with aspirin at home. Reviewed MRI with the patient. No headache, dysphagia or dysarthria or chest pain. Other review of system was unremarkable. ROS : A 10-12 system review obtained and updated today and is unremarkable except as mentioned  in my interval history. Past medical history, social history, medication and family history reviewed. Objective:  Exam:   Constitutional:   Vitals:    03/09/23 0715 03/09/23 0808 03/09/23 1108 03/09/23 1111   BP: (!) 155/78   (!) 158/82   Pulse: 73  75 79   Resp: 16  16 16   Temp: 97.7 °F (36.5 °C)   97.9 °F (36.6 °C)   TempSrc: Oral   Oral   SpO2: 95%  95% 98%   Weight:  185 lb 14.4 oz (84.3 kg)     Height:         General appearance:  Normal development and appear in no acute distress. Mental Status:   Oriented to person, place, problem, and time. Memory: Good immediate recall. Intact remote memory  Normal attention span and concentration. Language: intact naming, repeating and fluency   Good fund of Knowledge. Cranial Nerves:   II:   Pupils: equal, round, reactive to light  III,IV,VI: Extra Ocular Movements are intact. No nystagmus  V: Facial sensation is intact  VII: Facial strength and movements: intact and symmetric  XII: Tongue movements are normal  Musculoskeletal: 5/5 in all 4 extremities. Tone: Normal tone. Reflexes: Symmetric 2+ in both arms and legs.   Coordination: no pronator drift, no dysmetria with FNF  Sensation: normal.  Gait/Posture: steady gait        Data:  LABS:   Lab Results   Component Value Date/Time     03/09/2023 04:32 AM    K 4.5 03/09/2023 04:32 AM    K 4.3 03/08/2023 05:44 AM     03/09/2023 04:32 AM    CO2 27 03/09/2023 04:32 AM    BUN 16 03/09/2023 04:32 AM    CREATININE 0.6 03/09/2023 04:32 AM    GFRAA >60 10/14/2022 02:23 AM    GFRAA >60 03/03/2013 08:32 PM    LABGLOM >60 03/09/2023 04:32 AM    GLUCOSE 168 03/09/2023 04:32 AM    MG 2.00 08/04/2022 02:26 PM    CALCIUM 9.1 03/09/2023 04:32 AM     Lab Results   Component Value Date/Time    WBC 4.6 03/09/2023 04:32 AM    RBC 4.44 03/09/2023 04:32 AM    HGB 12.5 03/09/2023 04:32 AM    HCT 37.4 03/09/2023 04:32 AM    MCV 84.4 03/09/2023 04:32 AM    RDW 13.5 03/09/2023 04:32 AM     03/09/2023 04:32 AM   No results found for: INR, PROTIME    Neuroimaging was independently reviewed by me and discussed results with the patient  I reviewed blood testing and other test results and discussed results with the patient      Impression:  Acute right ischemic thalamic stroke. Thromboembolic vs cardioembolic  Hypertension  Hyperlipidemia        Recommendation  Echo  Secondary stroke prevention and education discussed today. Discussed risk of stroke recurrence. Aspirin and statin  Diabetic control  Insulin sliding scale  Will need to follow-up with PCP to consider 1 month event monitor to exclude paroxysmal A-fib  Telemetry  PT and OT  Continue current blood pressure medications  Blood pressure monitor at home  Can be DC once medically stable             Denice Jeans, MD   [phone]      This dictation was generated by voice recognition computer software. Although all attempts are made to edit the dictation for accuracy, there may be errors in the transcription that are not intended.

## 2023-03-09 NOTE — PROGRESS NOTES
Data- discharge order received, pt verbalized agreement to discharge, disposition to previous residence, no needs for HHC/DME. Action- discharge instructions prepared and given to pt with  at bedside, pt verbalized understanding. Medication information packet given r/t NEW and/or CHANGED prescriptions emphasizing name/purpose/side effects, pt verbalized understanding. Discharge instruction summary: Diet- reg, Activity- as tolerated, Primary Care Physician as follows: Maury Ambrose -[phone] f/u appointment as scheduled and verbalized understanding to call and make appt, immunizations reviewed, prescription medications filled at preferred pharmacy. 1. WEIGHT: Admit Weight: 187 lb 1 oz (84.9 kg) (03/07/23 1839)        Today  Weight: 185 lb 14.4 oz (84.3 kg) (03/09/23 0808)       2. O2 SAT.: SpO2: 98 % (03/09/23 1111)    Response- Pt belongings gathered, IV removed. Disposition is home (no HHC/DME needs), transported with personal belongings, taken to lobby via w/c w/ RN, no complications.

## 2023-03-09 NOTE — DISCHARGE SUMMARY
[facility] -- Physician Discharge Summary     Arthur Peterson  1950  MRN: [id number]    Admit Date: 3/7/2023  Discharge Date: No discharge date for patient encounter. Attending MD: Rosalina Smith MD  Discharging MD: Rosalina Smith MD  PCP: Rajiv Patel MD [address] [facility] [place] [phone]    Admission Diagnosis: TIA (transient ischemic attack) [G45.9]  Elevated blood pressure reading [R03.0]  Cerebrovascular accident (CVA), unspecified mechanism ([facility] [I63.9]  DISCHARGE DIAGNOSIS: acute cva    Full Hospital Problem List:  Active Hospital Problems    Diagnosis Date Noted    TIA (transient ischemic attack) [G45.9] 03/07/2023     Priority: Medium    Mixed hyperlipidemia [E78.2] 01/31/2018    GERD (gastroesophageal reflux disease) [K21.9] 01/30/2017    Essential hypertension [I10] 01/23/2016    Type 2 diabetes mellitus without complication ([facility] [M61.0] 01/23/2016           Hospital Course:   67 y.o. female who presents for evaluation of left-sided numbness. Patient states that she had the onset of left-sided numbness, weakness around 330 today. She states that she has not had a prior weakness. Denies any difficulty speaking. Denies any difficulty swallowing. She denies any prior history of stroke. She is not on anticoagulation. She states that it was difficult to walk because of the heaviness in her leg. She denies any chest pain. She is a diabetic. Stroke alert called  CT head negative for acute cva  Given persistence of symptoms, stroke team recommends admission    MRI brain done     Impression:       Small acute infarct right thalamus.         Neurology consulted  Pt stated on ASA, statin    ECHO done ***      Consults made during Hospitalization:  IP CONSULT TO NEUROLOGY  IP CONSULT TO PHYSICAL MEDICINE REHAB    Treatment team at time of Discharge: Treatment Team: Attending Provider: Rosalina Smith MD; Consulting Physician: Rosalina Smith MD; Patient Care Tech: Donal Derrick; Consulting Physician: Wood Christianson MD; Occupational Therapist: Humaira Navarro OT; Consulting Physician: Alli Sanon MD; Respiratory Therapist (Day): [facility], [facility]; Registered Nurse: Titus Richards RN    Imaging Results:  CT HEAD WO CONTRAST    Result Date: 3/8/2023  EXAMINATION: CT OF THE HEAD WITHOUT CONTRAST,  3/7/2023 6:16 pm TECHNIQUE: CT of the head was performed without the administration of intravenous contrast. Automated exposure control, iterative reconstruction, and/or weight based adjustment of the mA/kV was utilized to reduce the radiation dose to as low as reasonably achievable. COMPARISON: 01/14/2014 HISTORY: ORDERING SYSTEM PROVIDED HISTORY:  Stroke symptoms TECHNOLOGIST PROVIDED HISTORY: Has a \"code stroke\" or \"stroke alert\" been called? Yes Reason for Exam:  Stroke symptoms Decision Support Exception - unselect if not a suspected or confirmed emergency medical condition->Emergency Medical Condition (MA) Reason for Exam:  Stroke symptoms. Numbness (Left arm and left leg feeling heavy and tingling started around 1530 today. Left arm drift and left leg drift). FINDINGS: BRAIN/VENTRICLES: There is no CT evidence of acute intracranial hemorrhage, mass or mass effect. Cerebral atrophy is noted. The fourth ventricle is in the midline. The basilar cisterns are preserved. There are no abnormal extra-axial collections. ORBITS: The visualized portion of the orbits demonstrate no acute abnormality. SINUSES: The visualized paranasal sinuses and mastoid air cells demonstrate no acute abnormality. SOFT TISSUES/SKULL:  No acute abnormality of the visualized skull or soft tissues. No acute intracranial abnormality. Mild cerebral atrophy. This report was discussed with Dr. Tory Reynaga at 6:45 p.m. on 03/07/2023.      CTA HEAD NECK W CONTRAST    Result Date: 3/7/2023  EXAMINATION: CTA OF THE HEAD AND NECK WITH CONTRAST 3/7/2023 6:18 pm: TECHNIQUE: CTA of the head and neck was performed with the administration of intravenous contrast. Multiplanar reformatted images are provided for review. MIP images are provided for review. Stenosis of the internal carotid arteries measured using NASCET criteria. Automated exposure control, iterative reconstruction, and/or weight based adjustment of the mA/kV was utilized to reduce the radiation dose to as low as reasonably achievable. COMPARISON: Noncontrast CT head done same day. HISTORY: ORDERING SYSTEM PROVIDED HISTORY: Stroke Symptoms TECHNOLOGIST PROVIDED HISTORY: Has a \"code stroke\" or \"stroke alert\" been called? ->Yes Reason for exam:->Stroke Symptoms Decision Support Exception - unselect if not a suspected or confirmed emergency medical condition->Emergency Medical Condition (MA) Reason for Exam: Stroke symptoms. Numbness (Left arm and left leg feeling heavy and tingling started around 1530 today. Left arm drift and left leg drift.). FINDINGS: CTA NECK: AORTIC ARCH/ARCH VESSELS: No dissection or arterial injury. No significant stenosis of the brachiocephalic or subclavian arteries. Atherosclerosis in the visualized thoracic aorta and bilateral subclavian arteries. CAROTID ARTERIES: No dissection, arterial injury, or hemodynamically significant stenosis by NASCET criteria. Bilateral common carotid artery and carotid bulb atherosclerotic plaque. VERTEBRAL ARTERIES: No dissection, arterial injury, or significant stenosis. SOFT TISSUES: The lung apices are clear. No cervical or superior mediastinal lymphadenopathy. The larynx and pharynx are unremarkable. No acute abnormality of the salivary and thyroid glands. BONES: No acute osseous abnormality. Multilevel degenerative changes in the visualized spine are centered at C5-C6 and C6-C7. CTA HEAD: ANTERIOR CIRCULATION: No significant stenosis of the intracranial internal carotid, anterior cerebral, or middle cerebral arteries. No aneurysm.  Calcifications in the bilateral intracranial internal carotid arteries without hemodynamically significant stenosis. POSTERIOR CIRCULATION: No significant stenosis of the vertebral, basilar, or posterior cerebral arteries. No aneurysm. OTHER: No dural venous sinus thrombosis on this non-dedicated study. BRAIN: No mass effect or midline shift. No extra-axial fluid collection. The gray-white differentiation is maintained. No large vessel occlusion in the head or neck. This scan was analyzed using Viz. ai contact LVO. Identification of suspected findings is not for diagnostic use beyond notification. Viz LVO is limited to analysis of imaging data and should not be used in-lieu of full patient evaluation or relied upon to make or confirm diagnosis. MRI brain without contrast    Result Date: 3/8/2023  EXAMINATION: MRI OF THE BRAIN WITHOUT CONTRAST  3/8/2023 7:27 am TECHNIQUE: Multiplanar multisequence MRI of the brain was performed without the administration of intravenous contrast. COMPARISON: CT head, CTA head 03/07/2023. HISTORY: ORDERING SYSTEM PROVIDED HISTORY: TIA TECHNOLOGIST PROVIDED HISTORY: Reason for exam:->TIA Reason for Exam: TIA FINDINGS: INTRACRANIAL STRUCTURES/VENTRICLES: Small acute infarct right thalamus (series 4, image 15). No mass effect or midline shift. No evidence of an acute intracranial hemorrhage. The ventricles and sulci are normal in size and configuration. The sellar/suprasellar regions appear unremarkable. The normal signal voids within the major intracranial vessels appear maintained. Mild chronic ischemic white matter changes. Mild volume loss. ORBITS: The visualized portion of the orbits demonstrate no acute abnormality. SINUSES: Paranasal sinuses are clear. Small mastoid effusions. BONES/SOFT TISSUES: The bone marrow signal intensity appears normal. The soft tissues demonstrate no acute abnormality. Small acute infarct right thalamus.  Request for the core team to call results to the primary team was made 3/8/2023 at 8:19 am.         Discharge Exam:  See note from day of d/c    Disposition: home    Condition: stable    Discharge Medications:     Medication List        START taking these medications      aspirin 325 MG EC tablet  Take 1 tablet by mouth daily     atorvastatin 40 MG tablet  Commonly known as: LIPITOR  Take 1 tablet by mouth daily            CONTINUE taking these medications      acetaminophen 500 MG tablet  Commonly known as: TYLENOL  Take 1 tablet by mouth 4 times daily as needed for Pain     amLODIPine 5 MG tablet  Commonly known as: NORVASC     glimepiride 4 MG tablet  Commonly known as: AMARYL     losartan 100 MG tablet  Commonly known as: COZAAR  Take 1 tablet by mouth daily     metFORMIN 500 MG tablet  Commonly known as: Glucophage  Take 1 tablet by mouth 2 times daily (with meals). naproxen sodium 220 MG tablet  Commonly known as: ANAPROX     pantoprazole 40 MG injection  Commonly known as: PROTONIX            STOP taking these medications      diclofenac sodium 1 % Gel  Commonly known as: VOLTAREN     lidocaine 5 %  Commonly known as: Lidoderm     lidocaine viscous hcl 2 % Soln solution  Commonly known as: XYLOCAINE     naproxen 500 MG tablet  Commonly known as: Naprosyn            ASK your doctor about these medications      sucralfate 1 GM/10ML suspension  Commonly known as: Carafate  Take 10 mLs by mouth 4 times daily               Where to Get Your Medications        Information about where to get these medications is not yet available    Ask your nurse or doctor about these medications  aspirin 325 MG EC tablet  atorvastatin 40 MG tablet            Allergies: Allergies   Allergen Reactions    Bactrim [Sulfamethoxazole-Trimethoprim] Other (See Comments)     Yeast infection    Lisinopril Other (See Comments)     cough       Follow up Instructions: Follow-up with PCP: Jammie Martinez MD in 2 wj .       Total time spent on day of discharge including face-to-face visit, examination, documentation, counseling, preparation of discharge plans and followup, and discharge medicine reconciliation and presciptions is 33 minutes    Signed:  Angella Kasper MD  3/9/2023

## 2023-03-22 ENCOUNTER — TELEPHONE (OUTPATIENT)
Dept: CARDIOLOGY CLINIC | Age: 73
End: 2023-03-22

## 2023-03-27 NOTE — PROGRESS NOTES
[address]   Cardiac follow up    Referring Provider:  Jett Adame MD     Chief Complaint   Patient presents with    Hypertension    Coronary Artery Disease    Hyperlipidemia          History of Present Illness:  Henrry Díaz is a 68 y.o. female with a history of CAD/ HTN/ and hyperlipidemia. S/P 1/17: S/p PCI of Diag. Prior to her stent placement she presented to the ED with chest pain. 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 On 3/7/23 she presented for evaluation of left-sided numbness. Patient stated that she had the onset of left-sided numbness, weakness around 330 that day. She stated that she had not had a prior weakness. Denied any difficulty speaking. Denied any difficulty swallowing. She denied any prior history of stroke. She was not on anticoagulation. She stated that it was difficult to walk because of the heaviness in her leg. She denied any chest pain. Stroke alert was called, CT head negative for acute cva but given persistence of symptoms, stroke team recommended admission. MRI brain done which showed small acute infarct right thalamus. Today she is here for follow up. When she walks, she feels sob, but her pulse ox will read 96%. She's had covid twice and since having it, she's not had her sense of smell. She did have a TIA to right thalamus earlier this month. She continues with some numbness to her left arm. She is going to establish with Dr Macy Agarwal for PCP on this Friday. She is concerned about going back to work April 3. She works at [employer]. She is also taking care of her . Past Medical History:   has a past medical history of Abdominal pain, Arthritis, Asthma, Coronary artery disease involving native coronary artery of native heart with angina pectoris ([facility] Diabetes mellitus ([facility] GERD (gastroesophageal reflux disease), Hypertension, Mitral valve prolapse, Mixed hyperlipidemia, and Rotator cuff tear.     Surgical

## 2023-03-29 ENCOUNTER — OFFICE VISIT (OUTPATIENT)
Dept: CARDIOLOGY CLINIC | Age: 73
End: 2023-03-29
Payer: COMMERCIAL

## 2023-03-29 VITALS
HEIGHT: 65 IN | HEART RATE: 74 BPM | BODY MASS INDEX: 31.32 KG/M2 | WEIGHT: 188 LBS | OXYGEN SATURATION: 98 % | SYSTOLIC BLOOD PRESSURE: 132 MMHG | DIASTOLIC BLOOD PRESSURE: 68 MMHG

## 2023-03-29 DIAGNOSIS — I10 ESSENTIAL HYPERTENSION: ICD-10-CM

## 2023-03-29 DIAGNOSIS — I25.119 CORONARY ARTERY DISEASE INVOLVING NATIVE CORONARY ARTERY OF NATIVE HEART WITH ANGINA PECTORIS (HCC): ICD-10-CM

## 2023-03-29 DIAGNOSIS — G45.9 TIA (TRANSIENT ISCHEMIC ATTACK): Primary | ICD-10-CM

## 2023-03-29 DIAGNOSIS — E78.2 MIXED HYPERLIPIDEMIA: ICD-10-CM

## 2023-03-29 PROCEDURE — 99214 OFFICE O/P EST MOD 30 MIN: CPT | Performed by: INTERNAL MEDICINE

## 2023-03-29 PROCEDURE — 3078F DIAST BP <80 MM HG: CPT | Performed by: INTERNAL MEDICINE

## 2023-03-29 PROCEDURE — 1123F ACP DISCUSS/DSCN MKR DOCD: CPT | Performed by: INTERNAL MEDICINE

## 2023-03-29 PROCEDURE — 3074F SYST BP LT 130 MM HG: CPT | Performed by: INTERNAL MEDICINE

## 2023-03-29 NOTE — PATIENT INSTRUCTIONS
30 day heart monitor  Recommend talking to Dr Jase Holloway regarding appropriate time to return to work  [facility] ([facility]) PT/OT referral   Continue risk factor modifications. Call for any change in symptoms, call to report any changes in shortness of breath or development of chest pain with activity.     Follow up in 3 mos

## 2023-04-05 ENCOUNTER — HOSPITAL ENCOUNTER (OUTPATIENT)
Dept: PHYSICAL THERAPY | Age: 73
Setting detail: THERAPIES SERIES
Discharge: HOME | End: 2023-04-05
Payer: COMMERCIAL

## 2023-04-05 PROCEDURE — 97110 THERAPEUTIC EXERCISES: CPT

## 2023-04-05 PROCEDURE — 97162 PT EVAL MOD COMPLEX 30 MIN: CPT

## 2023-04-05 NOTE — PLAN OF CARE
in bed:      Stand pivot:    Supine to sit:       Bed to chair:    Sit to supine:        Gait Testing:     Gait   Level of Assistance needed:    SBA   Assistive Device Used:  none   Gait Deviations (firm surface/linoleum): No AD, forward flexed position, dec step length, dec thoracic rotation, BUE use to stand, high guard hand position     Stairs - NA  Level of Assistance needed:      Pt negotiated up/down 4 stairs:   []WNL with 0-1 HR, reciprocal pattern, no falter     []reciprocal    []step to pattern:       []no HR    []1 HR    [] B HR   Assistive Device Used:       Deficits noted:          Balance:   Static Sitting:  [x]normal  []good  []good-  []fair+  []fair   []fair -   []poor  Dynamic Sitting: []normal  []good  []good-  []fair+  []fair   []fair -   []poor  Static Standing:  [x]normal  []good  []good-  []fair+  []fair   []fair -   []poor  Dynamic Standing: []normal  []good  []good-  []fair+  []fair   []fair -   []poor    Balance/Special Tests/Outcome Measures: Result N/A Comments   SLS      Feet Together      Romberg         Tandem Stance      QOLIBRI      TUG Score 21 seconds   No AD, forward flexed position, dec step length, dec thoracic rotation, BUE use to stand, high guard hand position   10-meter Walk      30 sec Sit to Stand  5xSTS 6.5  22 seconds  No BUE use    6MWT 870 feet  No AD, forward flexed position, dec step length, dec thoracic rotation, BUE use to stand, high guard hand position   Mini-BESTest      Dynamic Gait Index      Functional Gait Assessment      Tinetti Assessment       ALMANZAR Balance Assessment               Coordination Testing:       Finger to Nose:        [x]WNL  []Impaired   Alternating pronation/supination:   []WNL  []Impaired   Finger/Thumb opposition:  []WNL  []Impaired   Heel to shin (sitting or supine):      [x]WNL  []Impaired   Alternating Toe Tapping:       []WNL  []Impaired     Flexibility     Hip flexion/Conrad test:    []decreased R  []decreased L  Hamstrings:

## 2023-04-05 NOTE — FLOWSHEET NOTE
[facility] - Outpatient Physical Therapy  Phone: [phone]   Fax: [phone]    Physical Therapy Daily Treatment Note    Date:  2023     Patient Name:  Macey Young    :  1950  MRN: [id number]  Medical Diagnosis:  TIA (transient ischemic attack) [G45.9]  Treatment Diagnosis: Dec BUE and BLE (L side more affected) strength, gait abnormalities, fall risk, impaired posture, dec functional mobility  Insurance/Certification information:  PT Insurance Information: BCBS (auth req, 30pcy - no hard max)  Physician Information:  Melita Dawkins MD    Plan of care signed (Y/N): []  Yes [x]  No     Date of Patient follow up with Physician:      Progress Report: []  Yes  [x]  No     Date Range for reporting period:  Beginnin2023  Ending:       Progress report due (10 Rx/or 30 days whichever is less): visit 8 or TBD     Recertification due (POC duration/ or 90 days whichever is less): visit #16 or TBD    Visit # Insurance Allowable Auth required?  Date Range    + 1/TBD BCBS (auth req, 30pcy - no hard max) [x]  Yes  []  No TBD       Units approved Units used Date Range   TBD TBD TBD     Latex Allergy:  [x]NO      []YES  Preferred Language for Healthcare:   [x]English       []Other:        Functional Scale/Test Evaluation 2023 30 day  60 day  Discharge    6MWT 870 feet       TUG 21 seconds, No AD      30\" chair test  5xSTS 6.5  22 seconds            Pain level:  0/10; N/T in L back, UE and LE      SUBJECTIVE:  See eval    OBJECTIVE:     See eval      RESTRICTIONS/PRECAUTIONS: Stent, Asthma, TIA, T2DM, neuropathy    Interventions/Exercises:   Therapeutic Exercises (50705) Resistance / level Sets/sec Reps Notes   TM       Discussed walking program for HEP X3 min                    Neuromuscular Re-ed (25602)        Tandem walking       Heel to tall knee transition       Tall to half knee transition       Half kneel fwd and backwards boxing hits

## 2023-04-17 ENCOUNTER — HOSPITAL ENCOUNTER (OUTPATIENT)
Dept: PHYSICAL THERAPY | Age: 73
Setting detail: THERAPIES SERIES
Discharge: HOME | End: 2023-04-17
Payer: COMMERCIAL

## 2023-04-17 ENCOUNTER — APPOINTMENT (OUTPATIENT)
Dept: GENERAL RADIOLOGY | Age: 73
End: 2023-04-17
Payer: COMMERCIAL

## 2023-04-17 ENCOUNTER — HOSPITAL ENCOUNTER (OUTPATIENT)
Age: 73
Setting detail: OBSERVATION
Discharge: HOME | End: 2023-04-18
Attending: EMERGENCY MEDICINE | Admitting: INTERNAL MEDICINE
Payer: COMMERCIAL

## 2023-04-17 DIAGNOSIS — R06.09 DYSPNEA ON EXERTION: ICD-10-CM

## 2023-04-17 DIAGNOSIS — R07.9 CHEST PAIN, UNSPECIFIED TYPE: Primary | ICD-10-CM

## 2023-04-17 DIAGNOSIS — R20.2 PARESTHESIA: ICD-10-CM

## 2023-04-17 LAB
ALBUMIN SERPL-MCNC: 4.2 G/DL (ref 3.4–5)
ALBUMIN/GLOB SERPL: 1.3 {RATIO} (ref 1.1–2.2)
ALP SERPL-CCNC: 103 U/L (ref 40–129)
ALT SERPL-CCNC: 16 U/L (ref 10–40)
ANION GAP SERPL CALCULATED.3IONS-SCNC: 8 MMOL/L (ref 3–16)
AST SERPL-CCNC: 17 U/L (ref 15–37)
BASOPHILS # BLD: 0 K/UL (ref 0–0.2)
BASOPHILS NFR BLD: 0.5 %
BILIRUB SERPL-MCNC: 0.4 MG/DL (ref 0–1)
BILIRUB UR QL STRIP.AUTO: NEGATIVE
BUN SERPL-MCNC: 16 MG/DL (ref 7–20)
CALCIUM SERPL-MCNC: 9.5 MG/DL (ref 8.3–10.6)
CHLORIDE SERPL-SCNC: 105 MMOL/L (ref 99–110)
CLARITY UR: CLEAR
CO2 SERPL-SCNC: 28 MMOL/L (ref 21–32)
COLOR UR: YELLOW
CREAT SERPL-MCNC: 0.6 MG/DL (ref 0.6–1.2)
D DIMER: 0.42 UG/ML FEU (ref 0–0.6)
DEPRECATED RDW RBC AUTO: 13.8 % (ref 12.4–15.4)
EOSINOPHIL # BLD: 0.2 K/UL (ref 0–0.6)
EOSINOPHIL NFR BLD: 4 %
GFR SERPLBLD CREATININE-BSD FMLA CKD-EPI: >60 ML/MIN/{1.73_M2}
GLUCOSE BLD-MCNC: 323 MG/DL (ref 70–99)
GLUCOSE SERPL-MCNC: 170 MG/DL (ref 70–99)
GLUCOSE UR STRIP.AUTO-MCNC: NEGATIVE MG/DL
HCT VFR BLD AUTO: 40.5 % (ref 36–48)
HGB BLD-MCNC: 13.6 G/DL (ref 12–16)
HGB UR QL STRIP.AUTO: NEGATIVE
KETONES UR STRIP.AUTO-MCNC: NEGATIVE MG/DL
LEUKOCYTE ESTERASE UR QL STRIP.AUTO: NEGATIVE
LIPASE SERPL-CCNC: 34 U/L (ref 13–60)
LYMPHOCYTES # BLD: 1.2 K/UL (ref 1–5.1)
LYMPHOCYTES NFR BLD: 19.8 %
MCH RBC QN AUTO: 28 PG (ref 26–34)
MCHC RBC AUTO-ENTMCNC: 33.6 G/DL (ref 31–36)
MCV RBC AUTO: 83.3 FL (ref 80–100)
MONOCYTES # BLD: 0.4 K/UL (ref 0–1.3)
MONOCYTES NFR BLD: 7.3 %
NEUTROPHILS # BLD: 4.1 K/UL (ref 1.7–7.7)
NEUTROPHILS NFR BLD: 68.4 %
NITRITE UR QL STRIP.AUTO: NEGATIVE
NT-PROBNP SERPL-MCNC: 92 PG/ML (ref 0–124)
PERFORMED ON: ABNORMAL
PH UR STRIP.AUTO: 7.5 [PH] (ref 5–8)
PLATELET # BLD AUTO: 219 K/UL (ref 135–450)
PMV BLD AUTO: 7.4 FL (ref 5–10.5)
POTASSIUM SERPL-SCNC: 4.7 MMOL/L (ref 3.5–5.1)
PROT SERPL-MCNC: 7.5 G/DL (ref 6.4–8.2)
PROT UR STRIP.AUTO-MCNC: NEGATIVE MG/DL
RBC # BLD AUTO: 4.87 M/UL (ref 4–5.2)
SODIUM SERPL-SCNC: 141 MMOL/L (ref 136–145)
SP GR UR STRIP.AUTO: 1.01 (ref 1–1.03)
TROPONIN T SERPL-MCNC: <0.01 NG/ML
TROPONIN T SERPL-MCNC: <0.01 NG/ML
UA COMPLETE W REFLEX CULTURE PNL UR: NORMAL
UA DIPSTICK W REFLEX MICRO PNL UR: NORMAL
URN SPEC COLLECT METH UR: NORMAL
UROBILINOGEN UR STRIP-ACNC: 1 E.U./DL
WBC # BLD AUTO: 6 K/UL (ref 4–11)

## 2023-04-17 PROCEDURE — 84484 ASSAY OF TROPONIN QUANT: CPT

## 2023-04-17 PROCEDURE — 6370000000 HC RX 637 (ALT 250 FOR IP): Performed by: INTERNAL MEDICINE

## 2023-04-17 PROCEDURE — 85379 FIBRIN DEGRADATION QUANT: CPT

## 2023-04-17 PROCEDURE — 93005 ELECTROCARDIOGRAM TRACING: CPT | Performed by: PHYSICIAN ASSISTANT

## 2023-04-17 PROCEDURE — 81003 URINALYSIS AUTO W/O SCOPE: CPT

## 2023-04-17 PROCEDURE — 36415 COLL VENOUS BLD VENIPUNCTURE: CPT

## 2023-04-17 PROCEDURE — 80053 COMPREHEN METABOLIC PANEL: CPT

## 2023-04-17 PROCEDURE — G0378 HOSPITAL OBSERVATION PER HR: HCPCS

## 2023-04-17 PROCEDURE — 99285 EMERGENCY DEPT VISIT HI MDM: CPT

## 2023-04-17 PROCEDURE — 83880 ASSAY OF NATRIURETIC PEPTIDE: CPT

## 2023-04-17 PROCEDURE — 71046 X-RAY EXAM CHEST 2 VIEWS: CPT

## 2023-04-17 PROCEDURE — 83690 ASSAY OF LIPASE: CPT

## 2023-04-17 PROCEDURE — 6370000000 HC RX 637 (ALT 250 FOR IP): Performed by: EMERGENCY MEDICINE

## 2023-04-17 PROCEDURE — 85025 COMPLETE CBC W/AUTO DIFF WBC: CPT

## 2023-04-17 PROCEDURE — 80061 LIPID PANEL: CPT

## 2023-04-17 RX ORDER — ALBUTEROL SULFATE 90 UG/1
2 AEROSOL, METERED RESPIRATORY (INHALATION) EVERY 6 HOURS PRN
COMMUNITY

## 2023-04-17 RX ORDER — NAPROXEN SODIUM 220 MG
220 TABLET ORAL 2 TIMES DAILY WITH MEALS
Status: DISCONTINUED | OUTPATIENT
Start: 2023-04-17 | End: 2023-04-17

## 2023-04-17 RX ORDER — SUCRALFATE 1 G/1
1 TABLET ORAL EVERY 8 HOURS SCHEDULED
Status: DISCONTINUED | OUTPATIENT
Start: 2023-04-17 | End: 2023-04-17

## 2023-04-17 RX ORDER — ACETAMINOPHEN 325 MG/1
650 TABLET ORAL 4 TIMES DAILY PRN
Status: DISCONTINUED | OUTPATIENT
Start: 2023-04-17 | End: 2023-04-18 | Stop reason: HOSPADM

## 2023-04-17 RX ORDER — GLIPIZIDE 5 MG/1
2.5 TABLET ORAL
Status: DISCONTINUED | OUTPATIENT
Start: 2023-04-18 | End: 2023-04-18 | Stop reason: HOSPADM

## 2023-04-17 RX ORDER — NEPHROCAP 1 MG
1 CAP ORAL DAILY
Status: DISCONTINUED | OUTPATIENT
Start: 2023-04-17 | End: 2023-04-18 | Stop reason: HOSPADM

## 2023-04-17 RX ORDER — ONDANSETRON 2 MG/ML
4 INJECTION INTRAMUSCULAR; INTRAVENOUS EVERY 6 HOURS PRN
Status: DISCONTINUED | OUTPATIENT
Start: 2023-04-17 | End: 2023-04-18 | Stop reason: HOSPADM

## 2023-04-17 RX ORDER — NITROGLYCERIN 0.4 MG/1
0.4 TABLET SUBLINGUAL EVERY 5 MIN PRN
Status: DISCONTINUED | OUTPATIENT
Start: 2023-04-17 | End: 2023-04-18 | Stop reason: HOSPADM

## 2023-04-17 RX ORDER — GABAPENTIN 100 MG/1
100 CAPSULE ORAL 2 TIMES DAILY
Status: DISCONTINUED | OUTPATIENT
Start: 2023-04-17 | End: 2023-04-18 | Stop reason: HOSPADM

## 2023-04-17 RX ORDER — LOSARTAN POTASSIUM 100 MG/1
100 TABLET ORAL DAILY
Status: DISCONTINUED | OUTPATIENT
Start: 2023-04-17 | End: 2023-04-18 | Stop reason: HOSPADM

## 2023-04-17 RX ORDER — DEXTROSE MONOHYDRATE 100 MG/ML
INJECTION, SOLUTION INTRAVENOUS CONTINUOUS PRN
Status: DISCONTINUED | OUTPATIENT
Start: 2023-04-17 | End: 2023-04-18 | Stop reason: HOSPADM

## 2023-04-17 RX ORDER — ATORVASTATIN CALCIUM 40 MG/1
40 TABLET, FILM COATED ORAL DAILY
Status: DISCONTINUED | OUTPATIENT
Start: 2023-04-17 | End: 2023-04-18 | Stop reason: HOSPADM

## 2023-04-17 RX ORDER — PANTOPRAZOLE SODIUM 40 MG/1
40 TABLET, DELAYED RELEASE ORAL DAILY
COMMUNITY

## 2023-04-17 RX ORDER — VITAMIN B COMPLEX
1 CAPSULE ORAL DAILY
COMMUNITY

## 2023-04-17 RX ORDER — PANTOPRAZOLE SODIUM 40 MG/1
40 TABLET, DELAYED RELEASE ORAL DAILY
Status: DISCONTINUED | OUTPATIENT
Start: 2023-04-17 | End: 2023-04-18 | Stop reason: HOSPADM

## 2023-04-17 RX ORDER — HYDROCODONE BITARTRATE AND ACETAMINOPHEN 5; 325 MG/1; MG/1
1 TABLET ORAL ONCE
Status: DISCONTINUED | OUTPATIENT
Start: 2023-04-17 | End: 2023-04-17

## 2023-04-17 RX ORDER — AMLODIPINE BESYLATE 5 MG/1
5 TABLET ORAL DAILY
Status: DISCONTINUED | OUTPATIENT
Start: 2023-04-17 | End: 2023-04-18 | Stop reason: HOSPADM

## 2023-04-17 RX ORDER — ASPIRIN 81 MG/1
324 TABLET, CHEWABLE ORAL ONCE
Status: COMPLETED | OUTPATIENT
Start: 2023-04-17 | End: 2023-04-17

## 2023-04-17 RX ORDER — ENOXAPARIN SODIUM 100 MG/ML
40 INJECTION SUBCUTANEOUS DAILY
Status: DISCONTINUED | OUTPATIENT
Start: 2023-04-17 | End: 2023-04-18 | Stop reason: HOSPADM

## 2023-04-17 RX ORDER — MORPHINE SULFATE 2 MG/ML
2 INJECTION, SOLUTION INTRAMUSCULAR; INTRAVENOUS EVERY 4 HOURS PRN
Status: DISCONTINUED | OUTPATIENT
Start: 2023-04-17 | End: 2023-04-18 | Stop reason: HOSPADM

## 2023-04-17 RX ORDER — ALBUTEROL SULFATE 90 UG/1
2 AEROSOL, METERED RESPIRATORY (INHALATION) EVERY 6 HOURS PRN
Status: DISCONTINUED | OUTPATIENT
Start: 2023-04-17 | End: 2023-04-18 | Stop reason: HOSPADM

## 2023-04-17 RX ADMIN — ATORVASTATIN CALCIUM 40 MG: 40 TABLET, FILM COATED ORAL at 21:40

## 2023-04-17 RX ADMIN — AMLODIPINE BESYLATE 5 MG: 5 TABLET ORAL at 21:40

## 2023-04-17 RX ADMIN — METFORMIN HYDROCHLORIDE 500 MG: 500 TABLET ORAL at 21:40

## 2023-04-17 RX ADMIN — ASPIRIN 324 MG: 81 TABLET, CHEWABLE ORAL at 14:56

## 2023-04-17 ASSESSMENT — PAIN SCALES - GENERAL
PAINLEVEL_OUTOF10: 0

## 2023-04-17 ASSESSMENT — ENCOUNTER SYMPTOMS
WHEEZING: 0
SHORTNESS OF BREATH: 1
COUGH: 0
DIARRHEA: 0
VOMITING: 0
NAUSEA: 0
CHEST TIGHTNESS: 0
ABDOMINAL PAIN: 0

## 2023-04-17 ASSESSMENT — HEART SCORE: ECG: 1

## 2023-04-17 ASSESSMENT — PAIN - FUNCTIONAL ASSESSMENT: PAIN_FUNCTIONAL_ASSESSMENT: 0-10

## 2023-04-17 ASSESSMENT — LIFESTYLE VARIABLES: HOW OFTEN DO YOU HAVE A DRINK CONTAINING ALCOHOL: NEVER

## 2023-04-17 NOTE — ED NOTES
Report given to Charge RN on [facility]. No questions or concerns at this time,.       Olga Genao RN  04/17/23 8423

## 2023-04-17 NOTE — PROGRESS NOTES
Pharmacy Home Medication Reconciliation Note    A medication reconciliation has been completed for Chris Ortiz 1950    Pharmacy: [address] [place] [address] provided by: patient    The patient's home medication list is as follows: No current facility-administered medications on file prior to encounter. Current Outpatient Medications on File Prior to Encounter   Medication Sig Dispense Refill    albuterol sulfate HFA (VENTOLIN HFA) 108 (90 Base) MCG/ACT inhaler Inhale 2 puffs into the lungs every 6 hours as needed for Wheezing      b complex vitamins capsule Take 1 capsule by mouth daily      pantoprazole (PROTONIX) 40 MG tablet Take 1 tablet by mouth daily      aspirin 325 MG EC tablet Take 1 tablet by mouth daily 90 tablet 3    atorvastatin (LIPITOR) 40 MG tablet Take 1 tablet by mouth daily 90 tablet 1    amLODIPine (NORVASC) 5 MG tablet Take 1 tablet by mouth daily      [DISCONTINUED] pantoprazole (PROTONIX) 40 MG injection Infuse 40 mg intravenously daily (Patient not taking: Reported on 4/17/2023)      sucralfate (CARAFATE) 1 GM/10ML suspension Take 10 mLs by mouth 4 times daily (Patient not taking: Reported on 10/1/2022) 1200 mL 0    acetaminophen (TYLENOL) 500 MG tablet Take 1 tablet by mouth 4 times daily as needed for Pain 60 tablet 0    glimepiride (AMARYL) 4 MG tablet Take 1 tablet by mouth every morning (before breakfast)      naproxen sodium (ANAPROX) 220 MG tablet Take 220 mg by mouth 2 times daily (with meals) (Patient not taking: Reported on 3/29/2023)      losartan (COZAAR) 100 MG tablet Take 1 tablet by mouth daily 30 tablet 3    metformin (GLUCOPHAGE) 500 MG tablet Take 1 tablet by mouth 2 times daily (with meals). 60 tablet 0       Patient is no longer taking naproxen, sucralfate, or IV pantoprazole (taking oral Protonix 40 mg daily). Timing of last doses updated. Thank you,  Mason Samano

## 2023-04-17 NOTE — PLAN OF CARE
[address]     Physical Therapy  Cancellation/No-show Note  Patient Name:  Cindy Oreilly  :  1950   Date:  2023  Cancelled visits to date:1  No-shows to date: 0    Patient status for today's appointment patient:  [x]  Cancelled ,   []  Rescheduled appointment  []  No-show     Reason given by patient:  []  Patient ill  []  Conflicting appointment  []  No transportation    []  Conflict with work  []  No reason given  [x]  Other:  pt has back pain   Comments:      Phone call information:   []  Phone call made today to patient at _ time at number provided:      []  Patient answered, conversation as follows:    []  Patient did not answer, message left as follows:  []  Phone call not made today  [x]  Phone call not needed - pt contacted us to cancel and provided reason for cancellation.      Electronically signed by:  Dong Lazo, PT, DPT

## 2023-04-18 ENCOUNTER — APPOINTMENT (OUTPATIENT)
Dept: GENERAL RADIOLOGY | Age: 73
End: 2023-04-18
Payer: COMMERCIAL

## 2023-04-18 VITALS
WEIGHT: 184.7 LBS | OXYGEN SATURATION: 98 % | HEART RATE: 65 BPM | SYSTOLIC BLOOD PRESSURE: 135 MMHG | BODY MASS INDEX: 30.77 KG/M2 | RESPIRATION RATE: 18 BRPM | HEIGHT: 65 IN | TEMPERATURE: 98.2 F | DIASTOLIC BLOOD PRESSURE: 69 MMHG

## 2023-04-18 LAB
CHOLEST SERPL-MCNC: 113 MG/DL (ref 0–199)
EKG ATRIAL RATE: 62 BPM
EKG DIAGNOSIS: NORMAL
EKG P AXIS: 61 DEGREES
EKG P-R INTERVAL: 176 MS
EKG Q-T INTERVAL: 414 MS
EKG QRS DURATION: 78 MS
EKG QTC CALCULATION (BAZETT): 420 MS
EKG R AXIS: 54 DEGREES
EKG T AXIS: 63 DEGREES
EKG VENTRICULAR RATE: 62 BPM
GLUCOSE BLD-MCNC: 127 MG/DL (ref 70–99)
HDLC SERPL-MCNC: 36 MG/DL (ref 40–60)
LDLC SERPL CALC-MCNC: 50 MG/DL
PERFORMED ON: ABNORMAL
TRIGL SERPL-MCNC: 136 MG/DL (ref 0–150)
VLDLC SERPL CALC-MCNC: 27 MG/DL

## 2023-04-18 PROCEDURE — G0378 HOSPITAL OBSERVATION PER HR: HCPCS

## 2023-04-18 PROCEDURE — 99222 1ST HOSP IP/OBS MODERATE 55: CPT | Performed by: INTERNAL MEDICINE

## 2023-04-18 PROCEDURE — 72070 X-RAY EXAM THORAC SPINE 2VWS: CPT

## 2023-04-18 PROCEDURE — 72100 X-RAY EXAM L-S SPINE 2/3 VWS: CPT

## 2023-04-18 PROCEDURE — 93010 ELECTROCARDIOGRAM REPORT: CPT | Performed by: INTERNAL MEDICINE

## 2023-04-18 PROCEDURE — 6370000000 HC RX 637 (ALT 250 FOR IP): Performed by: INTERNAL MEDICINE

## 2023-04-18 RX ORDER — NITROGLYCERIN 0.4 MG/1
0.4 TABLET SUBLINGUAL EVERY 5 MIN PRN
Qty: 25 TABLET | Refills: 3 | Status: SHIPPED | OUTPATIENT
Start: 2023-04-18

## 2023-04-18 RX ORDER — GABAPENTIN 100 MG/1
100 CAPSULE ORAL 2 TIMES DAILY
Qty: 30 CAPSULE | Refills: 0 | Status: SHIPPED | OUTPATIENT
Start: 2023-04-18 | End: 2023-05-03

## 2023-04-18 RX ADMIN — GABAPENTIN 100 MG: 100 CAPSULE ORAL at 09:31

## 2023-04-18 RX ADMIN — PANTOPRAZOLE SODIUM 40 MG: 40 TABLET, DELAYED RELEASE ORAL at 05:14

## 2023-04-18 RX ADMIN — GLIPIZIDE 2.5 MG: 5 TABLET ORAL at 05:14

## 2023-04-18 RX ADMIN — ASPIRIN 325 MG: 325 TABLET, COATED ORAL at 09:25

## 2023-04-18 RX ADMIN — NEPHROCAP 1 MG: 1 CAP ORAL at 09:26

## 2023-04-18 RX ADMIN — LOSARTAN POTASSIUM 100 MG: 100 TABLET, FILM COATED ORAL at 09:27

## 2023-04-18 RX ADMIN — METFORMIN HYDROCHLORIDE 500 MG: 500 TABLET ORAL at 09:32

## 2023-04-18 ASSESSMENT — PAIN SCALES - GENERAL
PAINLEVEL_OUTOF10: 0

## 2023-04-18 NOTE — PROGRESS NOTES
Her pain pattern is atypical of angina , her enzymes are negative ,she had a normal stress test 20 months ago , will however take opinion of her regular cardiologist if dtress test is essential before dismissal

## 2023-04-18 NOTE — PROGRESS NOTES
Data- discharge order received, pt verbalized agreement to discharge, disposition to previous residence, no needs for HHC/DME. Action- discharge instructions prepared and given to pt, pt verbalized understanding. Medication information packet given r/t NEW and/or CHANGED prescriptions emphasizing name/purpose/side effects, pt verbalized understanding. Discharge instruction summary: Diet- as tolerated, Activity-  as tolerated, Primary Care Physician as follows: Obdulia Greene -[phone] f/u appointment is scheduled for tomorrow per pt, immunizations reviewed, prescription medications filled with [facility]. Inpatient surgical procedure precautions reviewed: handout given. CHF Education reviewed. Pt/ Family has had a total of 60 minutes CHF education this admission encounter. 1. WEIGHT: Admit Weight: 186 lb (84.4 kg) (04/17/23 0955)        Today  Weight: 184 lb 11.2 oz (83.8 kg) (04/18/23 0000)       2. O2 SAT.: SpO2: 98 % (04/18/23 1245)    Response- Pt belongings gathered, IV removed. Disposition is home (no HHC/DME needs), transported with belongings, taken to lobby via w/c w/ staff, no complications.

## 2023-04-18 NOTE — PROGRESS NOTES
Hospital Problems             Last Modified POA    * (Principal) Chest pain 4/17/2023 Yes    Hypertension 4/17/2023 Yes    Diabetes mellitus ([address] 4/17/2023 Yes    Mitral valve prolapse 4/17/2023 Yes    Asthma 4/17/2023 Yes   Thoracic radiculopathy  H&P dictated

## 2023-04-18 NOTE — H&P
[facility]                     [facility], [address]                              HISTORY AND PHYSICAL    PATIENT NAME: Beba Maldonado                      :        1950  MED REC NO:   [id number]                          ROOM:       [id number]  ACCOUNT NO:   [de-identified]                           ADMIT DATE: 2023  PROVIDER:     Jd Perez MD    HISTORY OF PRESENT ILLNESS:  This is an observational stay. The patient  is a 77-year-old white American lady came to the emergency room with  history of left-sided subcostal pain that comes around her chest in a  radicular fashion from thoracic spine. The pain is worse on moving,  ambulating, but it can also be worse on exertion. There is no pleuritic  component to it. She does not have any typical exertional angina, had a  stress test with nuclear medicine myocardial perfusion about 18 months  ago. There is no associated shortness of breath. No diaphoresis. PAST MEDICAL HISTORY:  Pertinent for abdominal pain, osteoarthritis,  bronchial asthma, coronary artery disease involving native coronary  artery that was diagnosed in 2017, also her stress test was surprisingly  negative just 18 months ago in 2021, type 2 diabetes mellitus,  gastroesophageal reflux disease, hypertension, mitral valve prolapse,  mixed hyperlipidemia, rotator cuff repair. PAST SURGICAL HISTORY:  Pertinent for upper GI endoscopy, tonsillectomy,  coronary angioplasty with stent placement, wrist fracture surgery. FAMILY HISTORY:  Both the parents are , they both had diabetes. Father had stroke on top of it and atherosclerotic heart disease. SOCIAL HISTORY:  The patient is a  woman. She has two grown  children. She was always a nonsmoker, always a nondrinker. She quit  smoking 25 years ago. She worked as a full-time  at [employer],  doing cleaning. No history of substance abuse.     MEDICATIONS:

## 2023-04-18 NOTE — CONSULTS
tablet by mouth daily 1/24/16   Juanjose Whalen MD   metformin (GLUCOPHAGE) 500 MG tablet Take 1 tablet by mouth 2 times daily (with meals).  3/4/13   Gabriel Garcia MD      Current Medications:  Current Facility-Administered Medications   Medication Dose Route Frequency Provider Last Rate Last Admin    acetaminophen (TYLENOL) tablet 650 mg  650 mg Oral 4x Daily PRN Juanjose Whalen MD        albuterol sulfate HFA (PROVENTIL;VENTOLIN;PROAIR) 108 (90 Base) MCG/ACT inhaler 2 puff  2 puff Inhalation Q6H PRN Juanjose Whalen MD        amLODIPine (NORVASC) tablet 5 mg  5 mg Oral Daily Juanjose Whalen MD   5 mg at 04/17/23 2140    aspirin EC tablet 325 mg  325 mg Oral Daily Juanjose Whalen MD   325 mg at 04/18/23 0925    atorvastatin (LIPITOR) tablet 40 mg  40 mg Oral Daily Juanjose Whalen MD   40 mg at 04/17/23 2140    Virt-Caps CAPS 1 mg  1 capsule Oral Daily Juanjose Whalen MD   1 mg at 04/18/23 0926    glipiZIDE (GLUCOTROL) tablet 2.5 mg  2.5 mg Oral BID AC Juanjose Whalen MD   2.5 mg at 04/18/23 0514    losartan (COZAAR) tablet 100 mg  100 mg Oral Daily Juanjose Whalen MD   100 mg at 04/18/23 1960    metFORMIN (GLUCOPHAGE) tablet 500 mg  500 mg Oral BID WC Juanjose Whalen MD   500 mg at 04/18/23 0932    pantoprazole (PROTONIX) tablet 40 mg  40 mg Oral Daily Juanjose Whalen MD   40 mg at 04/18/23 0514    nitroGLYCERIN (NITROSTAT) SL tablet 0.4 mg  0.4 mg SubLINGual Q5 Min PRN Juanjose Whalen MD        morphine (PF) injection 2 mg  2 mg IntraVENous Q4H PRN Juanjose Whalen MD        ondansetron [facility]) injection 4 mg  4 mg IntraVENous Q6H PRN Juanjose Whalen MD        enoxaparin (LOVENOX) injection 40 mg  40 mg SubCUTAneous Daily Juanjose Whalen MD        dextrose bolus 10% 125 mL  125 mL IntraVENous PRN Juanjose Whalen MD        Or    dextrose bolus 10% 250 mL  250 mL IntraVENous PRN Juanjose Whalen MD        glucagon (rDNA) injection 1 mg  1 mg SubCUTAneous PRN Juanjose Whalen MD        dextrose 10 %

## 2023-04-18 NOTE — PLAN OF CARE
Problem: Discharge Planning  Goal: Discharge to home or other facility with appropriate resources  Outcome: Progressing  Flowsheets (Taken 4/17/2023 2046)  Discharge to home or other facility with appropriate resources: Identify barriers to discharge with patient and caregiver

## 2023-04-19 NOTE — ED PROVIDER NOTES
18, height 5' 5\" (1.651 m), weight 184 lb 11.2 oz (83.8 kg), SpO2 98 %, not currently breastfeeding. For further details of [facility] emergency department encounter, please see documentation by advanced practice provider, LIONEL Disla.     Raji Luna DO (electronically signed)  Attending Emergency Physician       Raji Luna DO  04/18/23 2019
on file       DISCONTINUED MEDICATIONS:  Discontinued Medications    No medications on file              (Please note that portions of this note were completed with a voice recognition program.  Efforts were made to edit the dictations but occasionally words are mis-transcribed.)    Dominick Maddox PA-C (electronically signed)           Dominick Maddox PA-C  04/17/23 0901

## 2023-04-20 ENCOUNTER — HOSPITAL ENCOUNTER (OUTPATIENT)
Dept: PHYSICAL THERAPY | Age: 73
Setting detail: THERAPIES SERIES
Discharge: HOME | End: 2023-04-20
Payer: COMMERCIAL

## 2023-04-20 PROCEDURE — 97110 THERAPEUTIC EXERCISES: CPT

## 2023-04-20 PROCEDURE — 97116 GAIT TRAINING THERAPY: CPT

## 2023-04-20 PROCEDURE — 97530 THERAPEUTIC ACTIVITIES: CPT

## 2023-04-20 NOTE — FLOWSHEET NOTE
carrying, lifting, house/yardwork, driving, computer work. NMR and Therapeutic Activities:    [x] (03083 or 81945) Provided verbal/tactile cueing for activities related to improving balance, coordination, kinesthetic sense, posture, motor skill, proprioception and motor activation to allow for proper function of   [x] LE / Core, hip and LE with self care and ADLs  [] UE / Shoulder complex: cervical, postural, scapular, scapulothoracic and UE control with self care, carrying, lifting, driving, computer work.   [] (95415) Gait Re-education- Provided training and instruction to the patient for proper LE, core and hip recruitment, positioning, and eccentric body weight control with ambulation re-education, including ascending & descending stairs     Home Management Training / Self Care:  [] (66084) Provided self-care/home management training related to activities of daily living and compensatory training, and/or use of adaptive equipment for improvement with: ADLs and compensatory training, meal preparation, safety procedures and instruction in use of adaptive equipment, including bathing, grooming, dressing, personal hygiene, basic household cleaning and chores.        Home Exercise Program:    [x] (33652) Reviewed/Progressed HEP activities related to strengthening, flexibility, endurance, ROM of   [x] LE / Core stability: core, hip and LE for functional self-care, mobility, lifting and ambulation/stair navigation   [] UE / Shoulder complex: cervical, postural, scapular, scapulothoracic and UE control with self care, reaching, carrying, lifting, house/yardwork, driving, computer work  [] (84817)Reviewed/Progressed HEP activities related to improving balance, coordination, kinesthetic sense, posture, motor skill, proprioception of   [] LE: core, hip and LE for self care, mobility, lifting, and ambulation/stair navigation    [] UE / Shoulder complex: cervical, postural,  scapular, scapulothoracic and UE control with

## 2023-04-21 ENCOUNTER — TELEPHONE (OUTPATIENT)
Dept: CARDIOLOGY CLINIC | Age: 73
End: 2023-04-21

## 2023-04-24 ENCOUNTER — HOSPITAL ENCOUNTER (OUTPATIENT)
Dept: PHYSICAL THERAPY | Age: 73
Setting detail: THERAPIES SERIES
Discharge: HOME | End: 2023-04-24
Payer: COMMERCIAL

## 2023-04-24 PROCEDURE — 97110 THERAPEUTIC EXERCISES: CPT

## 2023-04-24 PROCEDURE — 97140 MANUAL THERAPY 1/> REGIONS: CPT

## 2023-04-24 NOTE — FLOWSHEET NOTE
treatment to both thoracic spine and B knees, reporting easier to breath and improved B knee pain and walking. Continue manual treatment to each as able. Due to pt elevated pain levels before session, advised pt to utilize rollator more often to better manage pain. Return to LE strengthening, balance and gait training as pain allows. Treatment/Activity Tolerance:  [x] Patient able to complete tx [x] Patient limited by fatigue  [] Patient limited by pain  [] Patient limited by other medical complications  [] Other:     Prognosis: [x] Good [] Fair  [] Poor    Patient Requires Follow-up: [x] Yes  [] No    Plan for next treatment session:    PLAN: See eval. PT 2x / week for 8 weeks. [x] Continue per plan of care [] Alter current plan (see comments)  [] Plan of care initiated [] Hold pending MD visit [] Discharge    Electronically signed by: Juliana Posada, PT, DPT    Note: If patient does not return for scheduled/ recommended follow up visits, his note will serve as a discharge from care along with most recent update on progress.

## 2023-04-27 ENCOUNTER — HOSPITAL ENCOUNTER (OUTPATIENT)
Dept: PHYSICAL THERAPY | Age: 73
Setting detail: THERAPIES SERIES
Discharge: HOME | End: 2023-04-27
Payer: COMMERCIAL

## 2023-04-27 PROCEDURE — 97110 THERAPEUTIC EXERCISES: CPT

## 2023-04-27 PROCEDURE — 97140 MANUAL THERAPY 1/> REGIONS: CPT

## 2023-04-27 PROCEDURE — 97530 THERAPEUTIC ACTIVITIES: CPT

## 2023-04-27 NOTE — FLOWSHEET NOTE
[facility] - Outpatient Physical Therapy  Phone: [phone]   Fax: [phone]    Physical Therapy Daily Treatment Note    Date:  2023     Patient Name:  Carolyn Foley    :  1950  MRN: [id number]  Medical Diagnosis:  TIA (transient ischemic attack) [G45.9]  Treatment Diagnosis: Dec BUE and BLE (L side more affected) strength, gait abnormalities, fall risk, impaired posture, dec functional mobility  Insurance/Certification information:  PT Insurance Information: BCBS (auth req, 30pcy - no hard max)  Physician Information:  María Wood MD    Plan of care signed (Y/N): [x]  Yes []  No     Date of Patient follow up with Physician:      Progress Report: []  Yes  [x]  No     Date Range for reporting period:  Beginnin2023  Ending:       Progress report due (10 Rx/or 30 days whichever is less): visit 8 or 31     Recertification due (POC duration/ or 90 days whichever is less): visit #16 or 6/3/23    Visit # Insurance Allowable Auth required? Date Range    + 4/15 BCBS (auth req, 30pcy - no hard max) [x]  Yes  []  No Until 6/3/23       Latex Allergy:  [x]NO      []YES  Preferred Language for Healthcare:   [x]English       []Other:        Functional Scale/Test Evaluation 2023 30 day  60 day  Discharge    6MWT 870 feet       TUG 21 seconds, No AD      30\" chair test  5xSTS 6.5  22 seconds            Pain level:  6/10  B knees     SUBJECTIVE:   Has been using rollator for longer distances. Felt better after last session. Continues to have back discomfort. Waiting to get an MRI and got it scheduled May 4th. Continues to get SOB and states her oxygen isnt dropping. OBJECTIVE:     : inc in chest breathing during exercises.  Dec step length, UT guarding and dec rotation of RUE  See eval      RESTRICTIONS/PRECAUTIONS: Stent, Asthma, TIA, T2DM, neuropathy    Interventions/Exercises:   Therapeutic Exercises (02588) Resistance / level Sets/sec Reps Notes   Bike

## 2023-05-01 ENCOUNTER — HOSPITAL ENCOUNTER (OUTPATIENT)
Dept: PHYSICAL THERAPY | Age: 73
Setting detail: THERAPIES SERIES
Discharge: HOME | End: 2023-05-01
Payer: MEDICARE

## 2023-05-01 PROCEDURE — 97110 THERAPEUTIC EXERCISES: CPT

## 2023-05-01 PROCEDURE — 97112 NEUROMUSCULAR REEDUCATION: CPT

## 2023-05-01 NOTE — FLOWSHEET NOTE
[facility] Physical Therapy  Phone: [phone]   Fax: [phone]    Physical Therapy Daily Treatment Note    Date:  2023     Patient Name:  Tres Yen    :  1950  MRN: [id number]  Medical Diagnosis:  TIA (transient ischemic attack) [G45.9]  Treatment Diagnosis: Dec BUE and BLE (L side more affected) strength, gait abnormalities, fall risk, impaired posture, dec functional mobility  Insurance/Certification information:  PT Insurance Information: BCBS (auth req, 30pcy - no hard max)  Physician Information:  Naila Jefferson MD    Plan of care signed (Y/N): [x]  Yes []  No     Date of Patient follow up with Physician:      Progress Report: []  Yes  [x]  No     Date Range for reporting period:  Beginnin2023  Ending:       Progress report due (10 Rx/or 30 days whichever is less): visit 8 or [birth year]     Recertification due (POC duration/ or 90 days whichever is less): visit #16 or 6/3/23    Visit # Insurance Allowable Auth required? Date Range    + 5/15 BCBS (auth req, 30pcy - no hard max) [x]  Yes  []  No Until 6/3/23       Latex Allergy:  [x]NO      []YES  Preferred Language for Healthcare:   [x]English       []Other:        Functional Scale/Test Evaluation 2023 30 day  60 day  Discharge    6MWT 870 feet       TUG 21 seconds, No AD      30\" chair test  5xSTS 6.5  22 seconds            Pain level:  6/10  B knees     SUBJECTIVE:   States she has had increase in SOB and usually would feel it at night. Has MRI on Thursday. States her back is feeling well and getting better but feels an increase in tightness in her stomach when at night - also has an SPO2 monitor at home and states that her oxygen is always between 95-98. Has had asthma in the past. PCP appt on  in May. Has used a spirometer at home and has been doing that as well. States HEP as been helpful    OBJECTIVE:   : SPO2 - %  : inc in chest breathing during exercises.  Dec

## 2023-05-04 ENCOUNTER — HOSPITAL ENCOUNTER (OUTPATIENT)
Dept: MRI IMAGING | Age: 73
Discharge: HOME | End: 2023-05-04
Payer: MEDICARE

## 2023-05-04 ENCOUNTER — HOSPITAL ENCOUNTER (OUTPATIENT)
Dept: PHYSICAL THERAPY | Age: 73
Setting detail: THERAPIES SERIES
Discharge: HOME | End: 2023-05-04
Payer: MEDICARE

## 2023-05-04 DIAGNOSIS — M54.14 RADICULOPATHY, THORACIC REGION: ICD-10-CM

## 2023-05-04 PROCEDURE — 72146 MRI CHEST SPINE W/O DYE: CPT

## 2023-05-04 NOTE — PLAN OF CARE
[address]     Physical Therapy  Cancellation/No-show Note  Patient Name:  Kwesi Burden  :  1950   Date:  2023  Cancelled visits to date:1  No-shows to date: 1    Patient status for today's appointment patient:  []  Cancelled ,   []  Rescheduled appointment  [x]  No-show      Reason given by patient:  []  Patient ill  []  Conflicting appointment  []  No transportation    []  Conflict with work  [x]  No reason given  []  Other:     Comments:      Phone call information:   [x]  Phone call made today to patient at 12:34 time at number provided:      []  Patient answered, conversation as follows:    [x]  Patient did not answer, message left as follows: notified of missed appt, discussed no more appt swapna and provided number to swapna more. []  Phone call not made today  []  Phone call not needed - pt contacted us to cancel and provided reason for cancellation.      Electronically signed by:  Herlinda Richardson, PT, DPT

## 2023-05-09 ENCOUNTER — TELEPHONE (OUTPATIENT)
Dept: CARDIOLOGY CLINIC | Age: 73
End: 2023-05-09

## 2023-05-09 NOTE — TELEPHONE ENCOUNTER
Spoke with the patient and advised her of the results below per LES. Patient voiced understanding.  Call complete

## 2023-05-11 ENCOUNTER — HOSPITAL ENCOUNTER (OUTPATIENT)
Dept: PHYSICAL THERAPY | Age: 73
Setting detail: THERAPIES SERIES
Discharge: HOME | End: 2023-05-11
Payer: MEDICARE

## 2023-05-11 PROCEDURE — 97530 THERAPEUTIC ACTIVITIES: CPT

## 2023-05-11 PROCEDURE — 97110 THERAPEUTIC EXERCISES: CPT

## 2023-05-11 PROCEDURE — 97112 NEUROMUSCULAR REEDUCATION: CPT

## 2023-05-11 NOTE — FLOWSHEET NOTE
BUE use   Education about MRI findings with spine model, answered all patient questions. Edu about asking PCP for work restrictions to return at end of the month X8 min      Gait Training (73891)       Forward ambu           Backwards ambu       Side stepping       Fwd step over then return, SLS on AirEx Small blue bolster  -1 UE assist at ballet barre          Amb w/o AD   -instructed to increase B step length and arm swing;   pt able to demo & maintain without frequent cues   Ambulation with tripod cane   4/20: walking with step through pattern with cane in RUE to support LUE, dec in guarding and improvements in step length    Hurdles forward and lateral   4/20: unilateral UE support on ballet bar or SPC   Manual Intervention ([phone])       Grade II-III PA mid thoracic spine      STM B thoracic paraspinals             Prone quad stretch                        Modalities:     Pt. Education:  4/5/2023 patient educated on diagnosis, prognosis and expectations for rehab, all patient questions were answered. Edu about neuroplasticity and principles to improve functional mobility including reps, intensity, salience and etc.    HEP instruction:  Access Code: [email or web address]  URL: [email or web address]  Date: 04/27/2023  Prepared by: Nolon Sprain    Exercises  - Prone Quadriceps Stretch with Strap  - 1 x daily - 7 x weekly - 3 sets - 10 reps - 45 seconds hold  - Supine Diaphragmatic Breathing  - 1 x daily - 7 x weekly - 3 sets  - Seated 3 Way Exercise Ball Roll Out Stretch  - 1 x daily - 7 x weekly - 3 sets - 10 reps  - Prone Press Up On Elbows  - 1 x daily - 7 x weekly - 3 sets - 10 reps    4/20:  Access Code: [email or web address]  URL: [email or web address]  Date: 04/20/2023  Prepared by: Nolon Sprain    Exercises  - Reclined Diaphragmatic Breathing  - 1 x daily - 7 x weekly  4/5: Verbally explained walking program for up to 30 minutes (6 minute increments with standing or seated rest for 2-4 minutes and then

## 2023-05-16 ENCOUNTER — OFFICE VISIT (OUTPATIENT)
Dept: PULMONOLOGY | Age: 73
End: 2023-05-16
Payer: COMMERCIAL

## 2023-05-16 VITALS — OXYGEN SATURATION: 96 % | HEART RATE: 78 BPM

## 2023-05-16 DIAGNOSIS — J45.40 MODERATE PERSISTENT ASTHMA WITHOUT COMPLICATION: Primary | ICD-10-CM

## 2023-05-16 DIAGNOSIS — R07.9 CHEST PAIN, UNSPECIFIED TYPE: ICD-10-CM

## 2023-05-16 PROCEDURE — G8427 DOCREV CUR MEDS BY ELIG CLIN: HCPCS | Performed by: INTERNAL MEDICINE

## 2023-05-16 PROCEDURE — 1124F ACP DISCUSS-NO DSCNMKR DOCD: CPT | Performed by: INTERNAL MEDICINE

## 2023-05-16 PROCEDURE — 3017F COLORECTAL CA SCREEN DOC REV: CPT | Performed by: INTERNAL MEDICINE

## 2023-05-16 PROCEDURE — 1036F TOBACCO NON-USER: CPT | Performed by: INTERNAL MEDICINE

## 2023-05-16 PROCEDURE — 99204 OFFICE O/P NEW MOD 45 MIN: CPT | Performed by: INTERNAL MEDICINE

## 2023-05-16 PROCEDURE — 1090F PRES/ABSN URINE INCON ASSESS: CPT | Performed by: INTERNAL MEDICINE

## 2023-05-16 PROCEDURE — G8400 PT W/DXA NO RESULTS DOC: HCPCS | Performed by: INTERNAL MEDICINE

## 2023-05-16 PROCEDURE — G8417 CALC BMI ABV UP PARAM F/U: HCPCS | Performed by: INTERNAL MEDICINE

## 2023-05-16 RX ORDER — FLUTICASONE FUROATE AND VILANTEROL 200; 25 UG/1; UG/1
1 POWDER RESPIRATORY (INHALATION) DAILY
Qty: 1 EACH | Refills: 3 | Status: SHIPPED | OUTPATIENT
Start: 2023-05-16

## 2023-05-16 ASSESSMENT — ENCOUNTER SYMPTOMS
ABDOMINAL PAIN: 0
DIARRHEA: 0
CONSTIPATION: 0
SINUS PRESSURE: 0
NAUSEA: 0

## 2023-05-16 NOTE — PROGRESS NOTES
[facility]  Consult Note  Juana Wild MD       Jennifer Ramosricardo   YOB: 1950    Date of Visit:  5/16/2023    Assessment/Plan:  1. Moderate persistent asthma without complication  Her response to albuterol may suggest an asthmatic etiology of her complaints. Have her get PFT with bronchodilators  In the meantime we will start her on Breo 200, once daily  Continue albuterol as needed  - Full PFT Study With Bronchodilator; Future    2. Chest pain, unspecified type  She was tender to palpation in the left chest.  Had no abdominal tenderness or organomegaly on abdominal exam.  She feels like the pain she had on palpation is different than the tightness she feels when she is short of breath. Follow-up in 1 month      Chief Complaint   Patient presents with    Shortness of Breath     Had a stroke back in March and ever since has a numbness in left back area that moves around to the front and when it does it makes her short of breath Had MRI done and was seen at [facility] Dr Caroline Schneider and was told not back related and should see Pulmonary Uses Albuterol HFA Inhaler and does use it and it helps for a little while Only relief is if she goes and takes her bra off and relines in her chair sx's ease up        HPI  The patient presents today for evaluation of shortness of breath and chest pain. She had a stroke in March of this year. She has had some lingering difficulties on the left side with numbness. She had a subsequent admission after that with shortness of breath and chest pain. She was worked up by cardiology and that turned out okay. Has been having some pain and tightness in her chest.  She saw orthopedics after having MRI imaging. This was thought not to be revealing either. She does have a history of asthma since childhood. He has an albuterol inhaler that she takes when she coughs. She finds that helpful.   She has not used it during episodes of chest

## 2023-05-17 ENCOUNTER — HOSPITAL ENCOUNTER (OUTPATIENT)
Dept: PHYSICAL THERAPY | Age: 73
Setting detail: THERAPIES SERIES
Discharge: HOME | End: 2023-05-17
Payer: MEDICARE

## 2023-05-17 PROCEDURE — 97530 THERAPEUTIC ACTIVITIES: CPT

## 2023-05-17 PROCEDURE — 97110 THERAPEUTIC EXERCISES: CPT

## 2023-05-17 NOTE — FLOWSHEET NOTE
[facility] Physical Therapy  Phone: [phone]   Fax: [phone]    Physical Therapy Daily Treatment Note    Date:  2023     Patient Name:  Deneen Saul    :  1950  MRN: [id number]  Medical Diagnosis:  TIA (transient ischemic attack) [G45.9]  Treatment Diagnosis: Dec BUE and BLE (L side more affected) strength, gait abnormalities, fall risk, impaired posture, dec functional mobility  Insurance/Certification information:  PT Insurance Information: BCBS (auth req, 30pcy - no hard max)  Physician Information:  Albertina De Los Santos MD    Plan of care signed (Y/N): [x]  Yes []  No     Date of Patient follow up with Physician:      Progress Report: [x]  Yes  []  No     Date Range for reporting period:  Beginnin2023  PN:  Ending:         Progress report due (10 Rx/or 30 days whichever is less): visit 8 or 93     Recertification due (POC duration/ or 90 days whichever is less): visit #16 or 6/3/23    Visit # Insurance Allowable Auth required? Date Range    + 6/15 BCBS (auth req, 30pcy - no hard max) [x]  Yes  []  No Until 6/3/23       Latex Allergy:  [x]NO      []YES  Preferred Language for Healthcare:   [x]English       []Other:        Functional Scale/Test Evaluation 2023 30 day 23 60 day  Discharge    6MWT 870 feet 1000 ft without AD, inc in trunk lean to R, inc in LLE fatigue      TUG 21 seconds, No AD 13 seconds no AD     30\" chair test  5xSTS 6.5  22 seconds 9  17 seconds  No BUE use           Pain level:  6/10  B knees     SUBJECTIVE:  Saw a pulmonary doctor yesterday and stated that she may have asthma and may have a cracked rib because there is swelling on her rib but denies any falls/hits to her rib area. Her pulmonary function test is on . Left her cane in her car. States she's not having as much numbness. States she sometimes has pain when she breathes in sometimes.  Things are going well and states she feels like PT has been

## 2023-05-19 ENCOUNTER — HOSPITAL ENCOUNTER (OUTPATIENT)
Dept: PHYSICAL THERAPY | Age: 73
Setting detail: THERAPIES SERIES
Discharge: HOME | End: 2023-05-19
Payer: MEDICARE

## 2023-05-19 PROCEDURE — 97112 NEUROMUSCULAR REEDUCATION: CPT

## 2023-05-19 PROCEDURE — 97110 THERAPEUTIC EXERCISES: CPT

## 2023-05-19 NOTE — FLOWSHEET NOTE
[facility] Physical Therapy  Phone: [phone]   Fax: [phone]    Physical Therapy Daily Treatment Note    Date:  2023     Patient Name:  Macey oYung    :  1950  MRN: [id number]  Medical Diagnosis:  TIA (transient ischemic attack) [G45.9]  Treatment Diagnosis: Dec BUE and BLE (L side more affected) strength, gait abnormalities, fall risk, impaired posture, dec functional mobility  Insurance/Certification information:  PT Insurance Information: BCBS (auth req, 30pcy - no hard max)  Physician Information:  Melita Dawkins MD    Plan of care signed (Y/N): [x]  Yes []  No     Date of Patient follow up with Physician:      Progress Report: [x]  Yes  []  No     Date Range for reporting period:  Beginnin2023  PN:  Ending:         Progress report due (10 Rx/or 30 days whichever is less): visit 8 or 22     Recertification due (POC duration/ or 90 days whichever is less): visit #16 or 6/3/23    Visit # Insurance Allowable Auth required? Date Range    + 7/15 BCBS (auth req, 30pcy - no hard max) [x]  Yes  []  No Until 6/3/23       Latex Allergy:  [x]NO      []YES  Preferred Language for Healthcare:   [x]English       []Other:        Functional Scale/Test Evaluation 2023 30 day 23 60 day  Discharge    6MWT 870 feet 1000 ft without AD, inc in trunk lean to R, inc in LLE fatigue      TUG 21 seconds, No AD 13 seconds no AD     30\" chair test  5xSTS 6.5  22 seconds 9  17 seconds  No BUE use           Pain level:  6/10  B knees     SUBJECTIVE:  Doing okay. No discomfort in knees or back. Ambu without cane this date. OBJECTIVE:   :  wheezing b/w breaths. TTP on B ribs from T4/5- T10, dec bucket handle motion but improvement with diaphragmatic breathing, R rib cage increased protrusion in comparison to L rib cage. : MRI results from 23 Multi degenerative changes throughout thoracic spine w/o significant canal narrowing.

## 2023-05-22 ENCOUNTER — APPOINTMENT (OUTPATIENT)
Dept: PHYSICAL THERAPY | Age: 73
End: 2023-05-22
Payer: MEDICARE

## 2023-05-22 ENCOUNTER — HOSPITAL ENCOUNTER (OUTPATIENT)
Dept: PULMONOLOGY | Age: 73
Discharge: HOME | End: 2023-05-22
Payer: MEDICARE

## 2023-05-22 VITALS — OXYGEN SATURATION: 97 % | RESPIRATION RATE: 18 BRPM | HEART RATE: 71 BPM

## 2023-05-22 DIAGNOSIS — J45.40 MODERATE PERSISTENT ASTHMA WITHOUT COMPLICATION: ICD-10-CM

## 2023-05-22 LAB
DLCO %PRED: 75 %
DLCO PRED: NORMAL
DLCO/VA %PRED: NORMAL
DLCO/VA PRED: NORMAL
DLCO/VA: NORMAL
DLCO: NORMAL
EXPIRATORY TIME-POST: NORMAL
EXPIRATORY TIME: NORMAL
FEF 25-75% %CHNG: NORMAL
FEF 25-75% %PRED-POST: NORMAL
FEF 25-75% %PRED-PRE: NORMAL
FEF 25-75% PRED: NORMAL
FEF 25-75%-POST: NORMAL
FEF 25-75%-PRE: NORMAL
FEV1 %PRED-POST: 50 %
FEV1 %PRED-PRE: 50 %
FEV1 PRED: NORMAL
FEV1-POST: NORMAL
FEV1-PRE: NORMAL
FEV1/FVC %PRED-POST: NORMAL
FEV1/FVC %PRED-PRE: NORMAL
FEV1/FVC PRED: NORMAL
FEV1/FVC-POST: 62 %
FEV1/FVC-PRE: 63 %
FVC %PRED-POST: NORMAL
FVC %PRED-PRE: NORMAL
FVC PRED: NORMAL
FVC-POST: NORMAL
FVC-PRE: NORMAL
GAW %PRED: NORMAL
GAW PRED: NORMAL
GAW: NORMAL
IC %PRED: NORMAL
IC PRED: NORMAL
IC: NORMAL
MEP: NORMAL
MIP: NORMAL
MVV %PRED-PRE: NORMAL
MVV PRED: NORMAL
MVV-PRE: NORMAL
PEF %PRED-POST: NORMAL
PEF %PRED-PRE: NORMAL
PEF PRED: NORMAL
PEF%CHNG: NORMAL
PEF-POST: NORMAL
PEF-PRE: NORMAL
RAW %PRED: NORMAL
RAW PRED: NORMAL
RAW: NORMAL
RV %PRED: NORMAL
RV PRED: NORMAL
RV: NORMAL
SVC %PRED: NORMAL
SVC PRED: NORMAL
SVC: NORMAL
TLC %PRED: 89 %
TLC PRED: NORMAL
TLC: NORMAL
VA %PRED: NORMAL
VA PRED: NORMAL
VA: NORMAL
VTG %PRED: NORMAL
VTG PRED: NORMAL
VTG: NORMAL

## 2023-05-22 PROCEDURE — 6370000000 HC RX 637 (ALT 250 FOR IP): Performed by: INTERNAL MEDICINE

## 2023-05-22 PROCEDURE — 94729 DIFFUSING CAPACITY: CPT

## 2023-05-22 PROCEDURE — 94760 N-INVAS EAR/PLS OXIMETRY 1: CPT

## 2023-05-22 PROCEDURE — 94060 EVALUATION OF WHEEZING: CPT

## 2023-05-22 PROCEDURE — 94726 PLETHYSMOGRAPHY LUNG VOLUMES: CPT

## 2023-05-22 RX ORDER — ALBUTEROL SULFATE 90 UG/1
4 AEROSOL, METERED RESPIRATORY (INHALATION) ONCE
Status: COMPLETED | OUTPATIENT
Start: 2023-05-22 | End: 2023-05-22

## 2023-05-22 RX ADMIN — Medication 4 PUFF: at 08:09

## 2023-05-22 ASSESSMENT — PULMONARY FUNCTION TESTS
FEV1/FVC_PRE: 63
FEV1_PERCENT_PREDICTED_POST: 50
FEV1_PERCENT_PREDICTED_PRE: 50
FEV1/FVC_POST: 62

## 2023-05-24 ENCOUNTER — HOSPITAL ENCOUNTER (OUTPATIENT)
Dept: PHYSICAL THERAPY | Age: 73
Setting detail: THERAPIES SERIES
Discharge: HOME | End: 2023-05-24
Payer: MEDICARE

## 2023-05-24 NOTE — PLAN OF CARE
[address]     Physical Therapy  Cancellation/No-show Note  Patient Name:  Carmela Campos  :  1950   Date:  2023  Cancelled visits to date:2  No-shows to date: 0    Patient status for today's appointment patient:  [x]  Cancelled ,   []  Rescheduled appointment  []  No-show     Reason given by patient:  [x]  Patient ill  []  Conflicting appointment  []  No transportation    []  Conflict with work  []  No reason given  []  Other:     Comments:      Phone call information:   []  Phone call made today to patient at _ time at number provided:      []  Patient answered, conversation as follows:    []  Patient did not answer, message left as follows:  []  Phone call not made today  [x]  Phone call not needed - pt contacted us to cancel and provided reason for cancellation.      Electronically signed by:  Rosa M Bui, PT, DPT

## 2023-06-27 ENCOUNTER — OFFICE VISIT (OUTPATIENT)
Dept: PULMONOLOGY | Age: 73
End: 2023-06-27
Payer: COMMERCIAL

## 2023-06-27 VITALS — OXYGEN SATURATION: 95 % | HEART RATE: 78 BPM

## 2023-06-27 DIAGNOSIS — R10.13 EPIGASTRIC PAIN: ICD-10-CM

## 2023-06-27 DIAGNOSIS — J44.9 COPD WITH ASTHMA (HCC): Primary | ICD-10-CM

## 2023-06-27 DIAGNOSIS — R07.89 OTHER CHEST PAIN: ICD-10-CM

## 2023-06-27 PROCEDURE — 3023F SPIROM DOC REV: CPT | Performed by: INTERNAL MEDICINE

## 2023-06-27 PROCEDURE — G8417 CALC BMI ABV UP PARAM F/U: HCPCS | Performed by: INTERNAL MEDICINE

## 2023-06-27 PROCEDURE — 99214 OFFICE O/P EST MOD 30 MIN: CPT | Performed by: INTERNAL MEDICINE

## 2023-06-27 PROCEDURE — 3017F COLORECTAL CA SCREEN DOC REV: CPT | Performed by: INTERNAL MEDICINE

## 2023-06-27 PROCEDURE — 1036F TOBACCO NON-USER: CPT | Performed by: INTERNAL MEDICINE

## 2023-06-27 PROCEDURE — 1090F PRES/ABSN URINE INCON ASSESS: CPT | Performed by: INTERNAL MEDICINE

## 2023-06-27 PROCEDURE — 1124F ACP DISCUSS-NO DSCNMKR DOCD: CPT | Performed by: INTERNAL MEDICINE

## 2023-06-27 PROCEDURE — G8400 PT W/DXA NO RESULTS DOC: HCPCS | Performed by: INTERNAL MEDICINE

## 2023-06-27 PROCEDURE — G8427 DOCREV CUR MEDS BY ELIG CLIN: HCPCS | Performed by: INTERNAL MEDICINE

## 2023-06-27 RX ORDER — FLUTICASONE FUROATE, UMECLIDINIUM BROMIDE AND VILANTEROL TRIFENATATE 200; 62.5; 25 UG/1; UG/1; UG/1
1 POWDER RESPIRATORY (INHALATION) DAILY
Qty: 1 EACH | Refills: 5 | Status: SHIPPED | OUTPATIENT
Start: 2023-06-27

## 2023-06-27 ASSESSMENT — ENCOUNTER SYMPTOMS
SINUS PRESSURE: 0
NAUSEA: 0
DIARRHEA: 0
ABDOMINAL PAIN: 0
CONSTIPATION: 0

## 2023-07-19 ENCOUNTER — HOSPITAL ENCOUNTER (OUTPATIENT)
Dept: CT IMAGING | Age: 73
Discharge: HOME | End: 2023-07-19
Attending: INTERNAL MEDICINE
Payer: COMMERCIAL

## 2023-07-19 DIAGNOSIS — R07.89 OTHER CHEST PAIN: ICD-10-CM

## 2023-07-19 DIAGNOSIS — R10.13 EPIGASTRIC PAIN: ICD-10-CM

## 2023-07-19 DIAGNOSIS — J44.9 COPD WITH ASTHMA (HCC): ICD-10-CM

## 2023-07-19 LAB
CREAT SERPL-MCNC: <0.5 MG/DL (ref 0.6–1.2)
GFR SERPLBLD CREATININE-BSD FMLA CKD-EPI: >60 ML/MIN/{1.73_M2}

## 2023-07-19 PROCEDURE — 6360000004 HC RX CONTRAST MEDICATION: Performed by: INTERNAL MEDICINE

## 2023-07-19 PROCEDURE — 71260 CT THORAX DX C+: CPT

## 2023-07-19 PROCEDURE — 36415 COLL VENOUS BLD VENIPUNCTURE: CPT

## 2023-07-19 PROCEDURE — 82565 ASSAY OF CREATININE: CPT

## 2023-07-19 RX ADMIN — DIATRIZOATE MEGLUMINE AND DIATRIZOATE SODIUM 20 ML: 600; 100 SOLUTION ORAL; RECTAL at 15:19

## 2023-07-19 RX ADMIN — IOPAMIDOL 75 ML: 755 INJECTION, SOLUTION INTRAVENOUS at 15:21

## 2023-07-21 ENCOUNTER — TELEPHONE (OUTPATIENT)
Dept: PULMONOLOGY | Age: 73
End: 2023-07-21

## 2023-07-21 NOTE — TELEPHONE ENCOUNTER
Spoke with patient. Informed her the CT was just resulted today and Dr. To Greco will return to office on Monday. Patient states she would like a call on Monday with the results.

## 2023-07-28 ENCOUNTER — TELEPHONE (OUTPATIENT)
Dept: PULMONOLOGY | Age: 73
End: 2023-07-28

## 2023-07-28 NOTE — TELEPHONE ENCOUNTER
Pt called and said she prefers the [address] [place] [address] over [address] [place] [address]. She will need refills sent to [address] [place] [address].     Ph # [phone]

## 2023-09-29 ENCOUNTER — OFFICE VISIT (OUTPATIENT)
Dept: PULMONOLOGY | Age: 73
End: 2023-09-29
Payer: COMMERCIAL

## 2023-09-29 VITALS — BODY MASS INDEX: 31.85 KG/M2 | HEART RATE: 81 BPM | OXYGEN SATURATION: 94 % | WEIGHT: 191.4 LBS

## 2023-09-29 DIAGNOSIS — J44.89 COPD WITH ASTHMA: Primary | ICD-10-CM

## 2023-09-29 DIAGNOSIS — R91.1 PULMONARY NODULE: ICD-10-CM

## 2023-09-29 DIAGNOSIS — J43.2 CENTRILOBULAR EMPHYSEMA (HCC): ICD-10-CM

## 2023-09-29 PROCEDURE — G8427 DOCREV CUR MEDS BY ELIG CLIN: HCPCS | Performed by: INTERNAL MEDICINE

## 2023-09-29 PROCEDURE — G8400 PT W/DXA NO RESULTS DOC: HCPCS | Performed by: INTERNAL MEDICINE

## 2023-09-29 PROCEDURE — 1090F PRES/ABSN URINE INCON ASSESS: CPT | Performed by: INTERNAL MEDICINE

## 2023-09-29 PROCEDURE — 3023F SPIROM DOC REV: CPT | Performed by: INTERNAL MEDICINE

## 2023-09-29 PROCEDURE — 1124F ACP DISCUSS-NO DSCNMKR DOCD: CPT | Performed by: INTERNAL MEDICINE

## 2023-09-29 PROCEDURE — 3017F COLORECTAL CA SCREEN DOC REV: CPT | Performed by: INTERNAL MEDICINE

## 2023-09-29 PROCEDURE — 99214 OFFICE O/P EST MOD 30 MIN: CPT | Performed by: INTERNAL MEDICINE

## 2023-09-29 PROCEDURE — G8417 CALC BMI ABV UP PARAM F/U: HCPCS | Performed by: INTERNAL MEDICINE

## 2023-09-29 PROCEDURE — 1036F TOBACCO NON-USER: CPT | Performed by: INTERNAL MEDICINE

## 2023-09-29 ASSESSMENT — ENCOUNTER SYMPTOMS
DIARRHEA: 0
CONSTIPATION: 0
ABDOMINAL PAIN: 0
SINUS PRESSURE: 0
NAUSEA: 0

## 2023-10-21 ENCOUNTER — HOSPITAL ENCOUNTER (EMERGENCY)
Age: 73
Discharge: HOME | End: 2023-10-21
Attending: EMERGENCY MEDICINE
Payer: COMMERCIAL

## 2023-10-21 ENCOUNTER — APPOINTMENT (OUTPATIENT)
Dept: CT IMAGING | Age: 73
End: 2023-10-21
Payer: COMMERCIAL

## 2023-10-21 VITALS
DIASTOLIC BLOOD PRESSURE: 69 MMHG | RESPIRATION RATE: 17 BRPM | WEIGHT: 191 LBS | HEIGHT: 65 IN | HEART RATE: 65 BPM | OXYGEN SATURATION: 96 % | SYSTOLIC BLOOD PRESSURE: 156 MMHG | TEMPERATURE: 98 F | BODY MASS INDEX: 31.82 KG/M2

## 2023-10-21 DIAGNOSIS — R73.9 HYPERGLYCEMIA: Primary | ICD-10-CM

## 2023-10-21 DIAGNOSIS — M54.50 ACUTE RIGHT-SIDED LOW BACK PAIN WITHOUT SCIATICA: ICD-10-CM

## 2023-10-21 LAB
ANION GAP SERPL CALCULATED.3IONS-SCNC: 10 MMOL/L (ref 3–16)
ANION GAP SERPL CALCULATED.3IONS-SCNC: 11 MMOL/L (ref 3–16)
BASE EXCESS BLDV CALC-SCNC: -0.6 MMOL/L (ref -3–3)
BASE EXCESS BLDV CALC-SCNC: 0.2 MMOL/L (ref -3–3)
BASOPHILS # BLD: 0 K/UL (ref 0–0.2)
BASOPHILS NFR BLD: 0.8 %
BETA-HYDROXYBUTYRATE: 0.1 MMOL/L (ref 0–0.27)
BILIRUB UR QL STRIP.AUTO: NEGATIVE
BUN SERPL-MCNC: 14 MG/DL (ref 7–20)
BUN SERPL-MCNC: 15 MG/DL (ref 7–20)
CALCIUM SERPL-MCNC: 8.7 MG/DL (ref 8.3–10.6)
CALCIUM SERPL-MCNC: 9.1 MG/DL (ref 8.3–10.6)
CHLORIDE SERPL-SCNC: 102 MMOL/L (ref 99–110)
CHLORIDE SERPL-SCNC: 99 MMOL/L (ref 99–110)
CLARITY UR: CLEAR
CO2 BLDV-SCNC: 59 MMOL/L
CO2 BLDV-SCNC: 63 MMOL/L
CO2 SERPL-SCNC: 22 MMOL/L (ref 21–32)
CO2 SERPL-SCNC: 23 MMOL/L (ref 21–32)
COHGB MFR BLDV: 3.2 % (ref 0–1.5)
COHGB MFR BLDV: 3.3 % (ref 0–1.5)
COLOR UR: YELLOW
CREAT SERPL-MCNC: 0.5 MG/DL (ref 0.6–1.2)
CREAT SERPL-MCNC: 0.6 MG/DL (ref 0.6–1.2)
DEPRECATED RDW RBC AUTO: 13.5 % (ref 12.4–15.4)
DO-HGB MFR BLDV: 3 %
EOSINOPHIL # BLD: 0.2 K/UL (ref 0–0.6)
EOSINOPHIL NFR BLD: 4.8 %
GFR SERPLBLD CREATININE-BSD FMLA CKD-EPI: >60 ML/MIN/{1.73_M2}
GFR SERPLBLD CREATININE-BSD FMLA CKD-EPI: >60 ML/MIN/{1.73_M2}
GLUCOSE SERPL-MCNC: 249 MG/DL (ref 70–99)
GLUCOSE SERPL-MCNC: 502 MG/DL (ref 70–99)
GLUCOSE UR STRIP.AUTO-MCNC: >=1000 MG/DL
HCO3 BLDV-SCNC: 24.8 MMOL/L (ref 23–29)
HCO3 BLDV-SCNC: 26.6 MMOL/L (ref 23–29)
HCT VFR BLD AUTO: 40.1 % (ref 36–48)
HGB BLD-MCNC: 13.6 G/DL (ref 12–16)
HGB UR QL STRIP.AUTO: NEGATIVE
KETONES UR STRIP.AUTO-MCNC: NEGATIVE MG/DL
LEUKOCYTE ESTERASE UR QL STRIP.AUTO: NEGATIVE
LYMPHOCYTES # BLD: 1.1 K/UL (ref 1–5.1)
LYMPHOCYTES NFR BLD: 20.5 %
MCH RBC QN AUTO: 28.5 PG (ref 26–34)
MCHC RBC AUTO-ENTMCNC: 34 G/DL (ref 31–36)
MCV RBC AUTO: 83.8 FL (ref 80–100)
METHGB MFR BLDV: 0.2 %
METHGB MFR BLDV: 0.3 %
MONOCYTES # BLD: 0.4 K/UL (ref 0–1.3)
MONOCYTES NFR BLD: 7.2 %
NEUTROPHILS # BLD: 3.4 K/UL (ref 1.7–7.7)
NEUTROPHILS NFR BLD: 66.7 %
NITRITE UR QL STRIP.AUTO: NEGATIVE
O2 CT VFR BLDV CALC: 19 VOL %
O2 CT VFR BLDV CALC: 19 VOL %
O2 THERAPY: ABNORMAL
O2 THERAPY: ABNORMAL
PCO2 BLDV: 42.6 MMHG (ref 40–50)
PCO2 BLDV: 48.7 MMHG (ref 40–50)
PH BLDV: 7.34 [PH] (ref 7.35–7.45)
PH BLDV: 7.37 [PH] (ref 7.35–7.45)
PH UR STRIP.AUTO: 5 [PH] (ref 5–8)
PLATELET # BLD AUTO: 189 K/UL (ref 135–450)
PMV BLD AUTO: 7.5 FL (ref 5–10.5)
PO2 BLDV: 135 MMHG (ref 25–40)
PO2 BLDV: 87.3 MMHG (ref 25–40)
POTASSIUM SERPL-SCNC: 3.7 MMOL/L (ref 3.5–5.1)
POTASSIUM SERPL-SCNC: 4.1 MMOL/L (ref 3.5–5.1)
PROT UR STRIP.AUTO-MCNC: NEGATIVE MG/DL
RBC # BLD AUTO: 4.78 M/UL (ref 4–5.2)
SAO2 % BLDV: 100 %
SAO2 % BLDV: 97 %
SODIUM SERPL-SCNC: 132 MMOL/L (ref 136–145)
SODIUM SERPL-SCNC: 135 MMOL/L (ref 136–145)
SP GR UR STRIP.AUTO: >=1.03 (ref 1–1.03)
UA COMPLETE W REFLEX CULTURE PNL UR: ABNORMAL
UA DIPSTICK W REFLEX MICRO PNL UR: ABNORMAL
URN SPEC COLLECT METH UR: ABNORMAL
UROBILINOGEN UR STRIP-ACNC: 1 E.U./DL
WBC # BLD AUTO: 5.2 K/UL (ref 4–11)

## 2023-10-21 PROCEDURE — 82803 BLOOD GASES ANY COMBINATION: CPT

## 2023-10-21 PROCEDURE — 96375 TX/PRO/DX INJ NEW DRUG ADDON: CPT

## 2023-10-21 PROCEDURE — 99284 EMERGENCY DEPT VISIT MOD MDM: CPT

## 2023-10-21 PROCEDURE — 36415 COLL VENOUS BLD VENIPUNCTURE: CPT

## 2023-10-21 PROCEDURE — 80048 BASIC METABOLIC PNL TOTAL CA: CPT

## 2023-10-21 PROCEDURE — 2580000003 HC RX 258: Performed by: PHYSICIAN ASSISTANT

## 2023-10-21 PROCEDURE — 81003 URINALYSIS AUTO W/O SCOPE: CPT

## 2023-10-21 PROCEDURE — 82010 KETONE BODYS QUAN: CPT

## 2023-10-21 PROCEDURE — 6360000002 HC RX W HCPCS: Performed by: PHYSICIAN ASSISTANT

## 2023-10-21 PROCEDURE — 6370000000 HC RX 637 (ALT 250 FOR IP): Performed by: PHYSICIAN ASSISTANT

## 2023-10-21 PROCEDURE — 96374 THER/PROPH/DIAG INJ IV PUSH: CPT

## 2023-10-21 PROCEDURE — 74176 CT ABD & PELVIS W/O CONTRAST: CPT

## 2023-10-21 PROCEDURE — 85025 COMPLETE CBC W/AUTO DIFF WBC: CPT

## 2023-10-21 RX ORDER — 0.9 % SODIUM CHLORIDE 0.9 %
1000 INTRAVENOUS SOLUTION INTRAVENOUS ONCE
Status: COMPLETED | OUTPATIENT
Start: 2023-10-21 | End: 2023-10-21

## 2023-10-21 RX ORDER — ORPHENADRINE CITRATE 100 MG/1
100 TABLET, EXTENDED RELEASE ORAL 2 TIMES DAILY
Qty: 20 TABLET | Refills: 0 | Status: SHIPPED | OUTPATIENT
Start: 2023-10-21 | End: 2023-10-31

## 2023-10-21 RX ORDER — METHOCARBAMOL 100 MG/ML
1000 INJECTION, SOLUTION INTRAMUSCULAR; INTRAVENOUS ONCE
Status: COMPLETED | OUTPATIENT
Start: 2023-10-21 | End: 2023-10-21

## 2023-10-21 RX ORDER — ACETAMINOPHEN 500 MG
1000 TABLET ORAL ONCE
Status: DISCONTINUED | OUTPATIENT
Start: 2023-10-21 | End: 2023-10-21 | Stop reason: HOSPADM

## 2023-10-21 RX ADMIN — INSULIN HUMAN 5 UNITS: 100 INJECTION, SOLUTION PARENTERAL at 14:31

## 2023-10-21 RX ADMIN — METHOCARBAMOL 1000 MG: 100 INJECTION INTRAMUSCULAR; INTRAVENOUS at 14:48

## 2023-10-21 RX ADMIN — SODIUM CHLORIDE 1000 ML: 9 INJECTION, SOLUTION INTRAVENOUS at 14:29

## 2023-10-21 ASSESSMENT — PAIN SCALES - GENERAL
PAINLEVEL_OUTOF10: 9
PAINLEVEL_OUTOF10: 9

## 2023-10-21 ASSESSMENT — PAIN DESCRIPTION - LOCATION
LOCATION: BACK
LOCATION: BACK

## 2023-10-21 ASSESSMENT — ENCOUNTER SYMPTOMS
BACK PAIN: 1
SHORTNESS OF BREATH: 0
WHEEZING: 0
NAUSEA: 0
DIARRHEA: 0
ABDOMINAL PAIN: 0
VOMITING: 0
COUGH: 0
RHINORRHEA: 0

## 2023-10-21 ASSESSMENT — PAIN DESCRIPTION - ORIENTATION
ORIENTATION: RIGHT
ORIENTATION: LOWER;RIGHT

## 2023-10-21 ASSESSMENT — PAIN - FUNCTIONAL ASSESSMENT: PAIN_FUNCTIONAL_ASSESSMENT: 0-10

## 2023-10-21 ASSESSMENT — PAIN DESCRIPTION - DESCRIPTORS
DESCRIPTORS: ACHING;SHARP
DESCRIPTORS: ACHING

## 2023-10-21 NOTE — DISCHARGE INSTRUCTIONS
Check your blood sugar at least twice a day. Take medications as prescribed. Call Dr. Lorena Gomez office tomorrow for follow-up appointment and return if worse. Take Norflex for the back pain.

## 2023-10-21 NOTE — ED PROVIDER NOTES
within normal limits   BLOOD GAS, VENOUS - Abnormal; Notable for the following components:    pO2, Zeyad 135.0 (*)     Carboxyhemoglobin 3.2 (*)     All other components within normal limits   CBC WITH AUTO DIFFERENTIAL   BETA-HYDROXYBUTYRATE               Exam:    Well-nourished female in no acute distress. The pain appear to be reproducible in the right lower back area. She was neurologic intact with no focal findings. She lift her leg without difficulty. She had no significant lower back findings. Medical decision makin-year-old female presents with some musculoskeletal lower back pain. A CT of the spine shows nothing significant or acute. In addition, her urinalysis was not infected but did show some glucose below her secondary to her hyperglycemia. Patient's blood sugar was initially around 500. Initial VBG did not show any significant acidosis. Beta hydroxybutyrate was unremarkable. While the patient was here, she received some IV fluid and some insulin. On reexamination her blood sugar is just over 200 and again I repeated the VBG and she is not acidotic. The patient be discharged with musculoskeletal back pain and instructions to call her primary care physician tomorrow to follow-up for her elevated blood sugars. She is to keep an eye on her blood sugars and check them at least twice a day. She was given Norflex for her musculoskeletal pain. She is to return if worse. Is this patient to be included in the SEP-1 Core Measure due to severe sepsis or septic shock? No   Exclusion criteria - the patient is NOT to be included for SEP-1 Core Measure due to: Infection is not suspected      FINAL IMPRESSION:    1. Hyperglycemia    2.  Acute right-sided low back pain without sciatica           Juli Lopez MD  10/21/23 1422
Reassessment: Patient presents for evaluation of urinary frequency and back pain. On exam, she is resting comfortably in bed no acute distress and nontoxic. She is hypertensive but vitals otherwise stable and she is afebrile. Lungs are clear to auscultation bilaterally. Abdomen is soft, nontender. She has a mild right paraspinous musculature tenderness to the lumbar region. There is no midline tenderness or step-offs. No CVA tenderness. She was given Tylenol for symptomatic relief and will be reevaluated. Disposition Considerations (tests considered but not done, Admit vs D/C, Shared Decision Making, Pt Expectation of Test or Tx.): CBC and CMP remarkable for hyperglycemia at 502. No evidence of diabetic ketoacidosis. Urinalysis positive for glucosuria with no overt sign of infection. This is likely source of her urinary frequency. She was given fluid resuscitation and IV insulin in the ED. Also given Robaxin for additional symptomatic relief. After fluids BMP will be rechecked. This was pending at end of shift. Please see attending note for additional information regarding results, reevaluation and patient disposition. I am the Primary Clinician of Record. FINAL IMPRESSION      1. Hyperglycemia    2. Acute right-sided low back pain without sciatica          DISPOSITION/PLAN     DISPOSITION        PATIENT REFERRED TO:  No follow-up provider specified.     DISCHARGE MEDICATIONS:  New Prescriptions    No medications on file       DISCONTINUED MEDICATIONS:  Discontinued Medications    No medications on file              (Please note that portions of this note were completed with a voice recognition program.  Efforts were made to edit the dictations but occasionally words are mis-transcribed.)    Suman Martinez PA-C (electronically signed)            Kimberly Fonseca PA-C  10/21/23 7832

## 2024-02-09 ENCOUNTER — HOSPITAL ENCOUNTER (EMERGENCY)
Age: 74
Discharge: HOME | End: 2024-02-09
Payer: MEDICARE

## 2024-02-09 VITALS
OXYGEN SATURATION: 97 % | HEIGHT: 65 IN | SYSTOLIC BLOOD PRESSURE: 163 MMHG | RESPIRATION RATE: 16 BRPM | TEMPERATURE: 97.9 F | WEIGHT: 176.3 LBS | DIASTOLIC BLOOD PRESSURE: 73 MMHG | HEART RATE: 75 BPM | BODY MASS INDEX: 29.37 KG/M2

## 2024-02-09 DIAGNOSIS — G89.29 ACUTE EXACERBATION OF CHRONIC LOW BACK PAIN: Primary | ICD-10-CM

## 2024-02-09 DIAGNOSIS — M54.50 ACUTE EXACERBATION OF CHRONIC LOW BACK PAIN: Primary | ICD-10-CM

## 2024-02-09 PROCEDURE — 99284 EMERGENCY DEPT VISIT MOD MDM: CPT

## 2024-02-09 PROCEDURE — 6360000002 HC RX W HCPCS: Performed by: PHYSICIAN ASSISTANT

## 2024-02-09 PROCEDURE — 96372 THER/PROPH/DIAG INJ SC/IM: CPT

## 2024-02-09 PROCEDURE — 6370000000 HC RX 637 (ALT 250 FOR IP): Performed by: PHYSICIAN ASSISTANT

## 2024-02-09 RX ORDER — LIDOCAINE 4 G/G
1 PATCH TOPICAL ONCE
Status: DISCONTINUED | OUTPATIENT
Start: 2024-02-09 | End: 2024-02-09 | Stop reason: HOSPADM

## 2024-02-09 RX ORDER — LIDOCAINE 50 MG/G
1 PATCH TOPICAL DAILY
Qty: 30 PATCH | Refills: 0 | Status: SHIPPED | OUTPATIENT
Start: 2024-02-09

## 2024-02-09 RX ORDER — ORPHENADRINE CITRATE 30 MG/ML
60 INJECTION INTRAMUSCULAR; INTRAVENOUS ONCE
Status: COMPLETED | OUTPATIENT
Start: 2024-02-09 | End: 2024-02-09

## 2024-02-09 RX ORDER — PREDNISONE 10 MG/1
TABLET ORAL
Qty: 30 TABLET | Refills: 0 | Status: SHIPPED | OUTPATIENT
Start: 2024-02-09 | End: 2024-02-19

## 2024-02-09 RX ADMIN — ORPHENADRINE CITRATE 60 MG: 60 INJECTION INTRAMUSCULAR; INTRAVENOUS at 10:38

## 2024-02-09 ASSESSMENT — PAIN SCALES - GENERAL
PAINLEVEL_OUTOF10: 5
PAINLEVEL_OUTOF10: 5

## 2024-02-09 ASSESSMENT — PAIN DESCRIPTION - LOCATION: LOCATION: BACK

## 2024-02-09 ASSESSMENT — PAIN - FUNCTIONAL ASSESSMENT: PAIN_FUNCTIONAL_ASSESSMENT: 0-10

## 2024-02-09 NOTE — ED PROVIDER NOTES
[facility] EMERGENCY DEPARTMENT  EMERGENCY DEPARTMENT ENCOUNTER        Pt Name: Rosa Redding  MRN: [id number]  Birthdate 1950  Date of evaluation: 2/9/2024  Provider: Kendall Weber PA-C  PCP: Bennett Carty MD  Note Started: 10:50 AM EST 2/9/24      DEANNA. I have evaluated this patient.        CHIEF COMPLAINT       Chief Complaint   Patient presents with    Back Pain     Pt to ED with CC of back pain radiating to hips since 07/2023.  Pt states that she has been following with other provider and therapy but states the pain is worsening.  Pt states that she would like pain relief.       HISTORY OF PRESENT ILLNESS: 1 or more Elements     History From: patient  Limitations to history : None    Rosa Redding is a 73 y.o. female who presents to the emergency department with a chief complaints of right low back pain rating to her right buttock.  She has been dealing with this since July 2023.  Has been seen by [facility] and has received injections at [facility] along with 2 injections she states what sounds like under fluoroscopy.  She is unsure of these or epidural orders or an ablation or what type of injections these were.  Patient does have history of diabetes but states that her blood sugars typically run in the low 100s to mid 100s.  She states she has been dealing with some urinary incontinence also but this is been ongoing for multiple months and was seen by urology and was placed on medication that helped immensely but she was only able to get the samples that she was unable to actually afford the medication has not followed up since.  Denies loss of bowel function, urinary retention and or any other difficulty urinating or dysuria or hematuria.  Denies abdominal pain, nausea, vomiting, fevers, history of IV drug use, malignancy or kidney failure.  States she has not received any sort of injection in her back for over 2 months.  She states the pain is gotten worse over the past week or so

## 2024-04-01 ENCOUNTER — OFFICE VISIT (OUTPATIENT)
Dept: PULMONOLOGY | Age: 74
End: 2024-04-01
Payer: COMMERCIAL

## 2024-04-01 VITALS
SYSTOLIC BLOOD PRESSURE: 118 MMHG | WEIGHT: 190 LBS | DIASTOLIC BLOOD PRESSURE: 68 MMHG | BODY MASS INDEX: 31.65 KG/M2 | HEIGHT: 65 IN | OXYGEN SATURATION: 98 % | HEART RATE: 80 BPM

## 2024-04-01 DIAGNOSIS — J44.89 COPD WITH ASTHMA (HCC): Primary | ICD-10-CM

## 2024-04-01 DIAGNOSIS — R91.1 PULMONARY NODULE: ICD-10-CM

## 2024-04-01 DIAGNOSIS — J43.2 CENTRILOBULAR EMPHYSEMA (HCC): ICD-10-CM

## 2024-04-01 PROCEDURE — G8427 DOCREV CUR MEDS BY ELIG CLIN: HCPCS | Performed by: INTERNAL MEDICINE

## 2024-04-01 PROCEDURE — 3023F SPIROM DOC REV: CPT | Performed by: INTERNAL MEDICINE

## 2024-04-01 PROCEDURE — 1124F ACP DISCUSS-NO DSCNMKR DOCD: CPT | Performed by: INTERNAL MEDICINE

## 2024-04-01 PROCEDURE — 99214 OFFICE O/P EST MOD 30 MIN: CPT | Performed by: INTERNAL MEDICINE

## 2024-04-01 PROCEDURE — 3017F COLORECTAL CA SCREEN DOC REV: CPT | Performed by: INTERNAL MEDICINE

## 2024-04-01 PROCEDURE — 1036F TOBACCO NON-USER: CPT | Performed by: INTERNAL MEDICINE

## 2024-04-01 PROCEDURE — G8400 PT W/DXA NO RESULTS DOC: HCPCS | Performed by: INTERNAL MEDICINE

## 2024-04-01 PROCEDURE — G8417 CALC BMI ABV UP PARAM F/U: HCPCS | Performed by: INTERNAL MEDICINE

## 2024-04-01 PROCEDURE — 1090F PRES/ABSN URINE INCON ASSESS: CPT | Performed by: INTERNAL MEDICINE

## 2024-04-01 PROCEDURE — 3074F SYST BP LT 130 MM HG: CPT | Performed by: INTERNAL MEDICINE

## 2024-04-01 PROCEDURE — 3078F DIAST BP <80 MM HG: CPT | Performed by: INTERNAL MEDICINE

## 2024-04-01 RX ORDER — FLUTICASONE FUROATE, UMECLIDINIUM BROMIDE AND VILANTEROL TRIFENATATE 200; 62.5; 25 UG/1; UG/1; UG/1
1 POWDER RESPIRATORY (INHALATION) DAILY
Qty: 1 EACH | Refills: 5 | Status: SHIPPED | OUTPATIENT
Start: 2024-04-01

## 2024-04-01 RX ORDER — PIOGLITAZONEHYDROCHLORIDE 30 MG/1
30 TABLET ORAL DAILY
COMMUNITY
Start: 2024-03-05

## 2024-04-01 RX ORDER — ALBUTEROL SULFATE 90 UG/1
2 AEROSOL, METERED RESPIRATORY (INHALATION) EVERY 6 HOURS PRN
Qty: 18 G | Refills: 5 | Status: SHIPPED | OUTPATIENT
Start: 2024-04-01

## 2024-04-01 NOTE — PROGRESS NOTES
[facility]  Progress note  MD Zelalem Leee BETHANY Redding   YOB: 1950    Date of Visit:  4/1/2024    Assessment/Plan:  1. COPD with asthma/Emphysema  PFT 5/23:  Spirometry:   Flow volume loops were obstructed. The FEV-1/FVC ratio was   decreased. The FEV-1 was 1.12 liters (50% of predicted), which   was severely decreased. The FVC was 1.79 liters (60% of   predicted), which was decreased. Response to inhaled   bronchodilators (albuterol) was not significant.     Lung volumes:   Lung volumes were tested by plethysmography. The total lung   capacity was 4.44 liters (89% of predicted), which was normal.   The residual volume was 2.54 liters (124% of predicted), which   was increased. The ratio of residual volume to total lung   capacity (RV/TLC) was 57, which was increased.     Diffusion capacity was found to be 75% which is Mildly decreased.       CT shows mild emphysema    Interpretation:   Severe obstructive airway disease with no significant   bronchodilator reversibility.     Breo ==> Trelegy 200 ==> Not as good as Breo  Now back on Breo 200 ==> gave bloating and abd pain ==> Back on Trelegy but that makes her cough though her breathing is better.    Albuterol prn    2. Epigastric pain/chest pain/numbness  W/U is negative  Probably residual of her stroke from last year in December    3. PN  I have independently reviewed radiographic images for this patient as part of this visit.  My impression:  GGO in the RLL 7 mm  Repeat CT in July      6 months      Chief Complaint   Patient presents with    COPD    Asthma    Cough       HPI  The patient presents with a chief complaint of moderate shortness of breath related to mild COPD of many years duration. He has mild associated cough. Exertion is a modifying factor.    Cleaning product odors give her trouble  She had trouble with the inhalers as described above.    Review of Systems  No Chest pain, Nausea

## 2024-04-12 ENCOUNTER — APPOINTMENT (OUTPATIENT)
Dept: CT IMAGING | Age: 74
End: 2024-04-12
Payer: COMMERCIAL

## 2024-04-12 ENCOUNTER — HOSPITAL ENCOUNTER (EMERGENCY)
Age: 74
Discharge: HOME | End: 2024-04-12
Payer: COMMERCIAL

## 2024-04-12 VITALS
WEIGHT: 183 LBS | RESPIRATION RATE: 18 BRPM | DIASTOLIC BLOOD PRESSURE: 80 MMHG | BODY MASS INDEX: 31.24 KG/M2 | OXYGEN SATURATION: 97 % | TEMPERATURE: 98.1 F | HEART RATE: 75 BPM | SYSTOLIC BLOOD PRESSURE: 143 MMHG | HEIGHT: 64 IN

## 2024-04-12 DIAGNOSIS — R10.9 RIGHT FLANK PAIN: Primary | ICD-10-CM

## 2024-04-12 LAB
ALBUMIN SERPL-MCNC: 4.3 G/DL (ref 3.4–5)
ALBUMIN/GLOB SERPL: 1.3 {RATIO} (ref 1.1–2.2)
ALP SERPL-CCNC: 100 U/L (ref 40–129)
ALT SERPL-CCNC: 14 U/L (ref 10–40)
ANION GAP SERPL CALCULATED.3IONS-SCNC: 12 MMOL/L (ref 3–16)
AST SERPL-CCNC: 19 U/L (ref 15–37)
BACTERIA URNS QL MICRO: NORMAL /HPF
BASOPHILS # BLD: 0 K/UL (ref 0–0.2)
BASOPHILS NFR BLD: 0.5 %
BILIRUB SERPL-MCNC: 0.5 MG/DL (ref 0–1)
BILIRUB UR QL STRIP.AUTO: NEGATIVE
BUN SERPL-MCNC: 23 MG/DL (ref 7–20)
CALCIUM SERPL-MCNC: 9.6 MG/DL (ref 8.3–10.6)
CHLORIDE SERPL-SCNC: 103 MMOL/L (ref 99–110)
CLARITY UR: CLEAR
CO2 SERPL-SCNC: 25 MMOL/L (ref 21–32)
COLOR UR: YELLOW
CREAT SERPL-MCNC: 0.6 MG/DL (ref 0.6–1.2)
DEPRECATED RDW RBC AUTO: 14 % (ref 12.4–15.4)
EOSINOPHIL # BLD: 0.3 K/UL (ref 0–0.6)
EOSINOPHIL NFR BLD: 4.3 %
EPI CELLS #/AREA URNS AUTO: 3 /HPF (ref 0–5)
GFR SERPLBLD CREATININE-BSD FMLA CKD-EPI: >90 ML/MIN/{1.73_M2}
GLUCOSE SERPL-MCNC: 102 MG/DL (ref 70–99)
GLUCOSE UR STRIP.AUTO-MCNC: NEGATIVE MG/DL
HCT VFR BLD AUTO: 38.3 % (ref 36–48)
HGB BLD-MCNC: 13 G/DL (ref 12–16)
HGB UR QL STRIP.AUTO: NEGATIVE
HYALINE CASTS #/AREA URNS AUTO: 0 /LPF (ref 0–8)
KETONES UR STRIP.AUTO-MCNC: NEGATIVE MG/DL
LEUKOCYTE ESTERASE UR QL STRIP.AUTO: ABNORMAL
LYMPHOCYTES # BLD: 1.9 K/UL (ref 1–5.1)
LYMPHOCYTES NFR BLD: 27.2 %
MCH RBC QN AUTO: 28.6 PG (ref 26–34)
MCHC RBC AUTO-ENTMCNC: 33.9 G/DL (ref 31–36)
MCV RBC AUTO: 84.4 FL (ref 80–100)
MONOCYTES # BLD: 0.6 K/UL (ref 0–1.3)
MONOCYTES NFR BLD: 9.2 %
NEUTROPHILS # BLD: 4.1 K/UL (ref 1.7–7.7)
NEUTROPHILS NFR BLD: 58.8 %
NITRITE UR QL STRIP.AUTO: NEGATIVE
PH UR STRIP.AUTO: 5.5 [PH] (ref 5–8)
PLATELET # BLD AUTO: 215 K/UL (ref 135–450)
PMV BLD AUTO: 7.4 FL (ref 5–10.5)
POTASSIUM SERPL-SCNC: 4 MMOL/L (ref 3.5–5.1)
PROT SERPL-MCNC: 7.5 G/DL (ref 6.4–8.2)
PROT UR STRIP.AUTO-MCNC: NEGATIVE MG/DL
RBC # BLD AUTO: 4.54 M/UL (ref 4–5.2)
RBC CLUMPS #/AREA URNS AUTO: 1 /HPF (ref 0–4)
SODIUM SERPL-SCNC: 140 MMOL/L (ref 136–145)
SP GR UR STRIP.AUTO: 1.02 (ref 1–1.03)
UA COMPLETE W REFLEX CULTURE PNL UR: ABNORMAL
UA DIPSTICK W REFLEX MICRO PNL UR: YES
URN SPEC COLLECT METH UR: ABNORMAL
UROBILINOGEN UR STRIP-ACNC: 2 E.U./DL
WBC # BLD AUTO: 7 K/UL (ref 4–11)
WBC #/AREA URNS AUTO: 5 /HPF (ref 0–5)

## 2024-04-12 PROCEDURE — 74176 CT ABD & PELVIS W/O CONTRAST: CPT

## 2024-04-12 PROCEDURE — 99284 EMERGENCY DEPT VISIT MOD MDM: CPT

## 2024-04-12 PROCEDURE — 6360000002 HC RX W HCPCS: Performed by: PHYSICIAN ASSISTANT

## 2024-04-12 PROCEDURE — 80053 COMPREHEN METABOLIC PANEL: CPT

## 2024-04-12 PROCEDURE — 96374 THER/PROPH/DIAG INJ IV PUSH: CPT

## 2024-04-12 PROCEDURE — 85025 COMPLETE CBC W/AUTO DIFF WBC: CPT

## 2024-04-12 PROCEDURE — 81001 URINALYSIS AUTO W/SCOPE: CPT

## 2024-04-12 RX ORDER — KETOROLAC TROMETHAMINE 15 MG/ML
15 INJECTION, SOLUTION INTRAMUSCULAR; INTRAVENOUS ONCE
Status: COMPLETED | OUTPATIENT
Start: 2024-04-12 | End: 2024-04-12

## 2024-04-12 RX ORDER — IBUPROFEN 600 MG/1
600 TABLET ORAL 3 TIMES DAILY PRN
Qty: 30 TABLET | Refills: 0 | Status: SHIPPED | OUTPATIENT
Start: 2024-04-12

## 2024-04-12 RX ADMIN — KETOROLAC TROMETHAMINE 15 MG: 15 INJECTION, SOLUTION INTRAMUSCULAR; INTRAVENOUS at 16:03

## 2024-04-12 ASSESSMENT — ENCOUNTER SYMPTOMS
SORE THROAT: 0
VOMITING: 0
NAUSEA: 0
SHORTNESS OF BREATH: 0
DIARRHEA: 0
BACK PAIN: 0
CONSTIPATION: 0
ABDOMINAL PAIN: 1
EYE PAIN: 0
RHINORRHEA: 0

## 2024-04-12 ASSESSMENT — PAIN SCALES - GENERAL: PAINLEVEL_OUTOF10: 8

## 2024-04-12 ASSESSMENT — PAIN - FUNCTIONAL ASSESSMENT: PAIN_FUNCTIONAL_ASSESSMENT: 0-10

## 2024-04-12 NOTE — ED PROVIDER NOTES
[facility] EMERGENCY DEPARTMENT  EMERGENCY DEPARTMENT ENCOUNTER        Pt Name: Rosa Redding  MRN: [id number]  Birthdate 1950  Date of evaluation: 4/12/2024  Provider: LIONEL Fish  PCP: Bennett Carty MD  Note Started: 3:56 PM EDT 4/12/24      DEANNA. I have evaluated this patient.        CHIEF COMPLAINT       Chief Complaint   Patient presents with    Flank Pain     Patient states R sided flank pain that started today, denies n/v/d.       HISTORY OF PRESENT ILLNESS: 1 or more Elements     History from : Patient    Limitations to history : None    Rosa Redding is a 74 y.o. female who presents to the emergency department due to right flank pain that has been going on since this morning.  Patient denies any nausea vomiting diarrhea.  Denies history of kidney stones.  She states that she has urinary frequency but states that this is common for her.  She denies any fevers or chills.  She denies any chest pain shortness of breath difficulty breathing.  She did take some Advil this morning which did help somewhat with her symptoms.  She denies any falls or injuries to the right flank or abdomen area.  She denies any abdominal surgeries.    Nursing Notes were all reviewed and agreed with or any disagreements were addressed in the HPI.    REVIEW OF SYSTEMS :      Review of Systems   Constitutional:  Negative for chills, diaphoresis and fever.   HENT:  Negative for congestion, rhinorrhea and sore throat.    Eyes:  Negative for pain and visual disturbance.   Respiratory:  Negative for cough and shortness of breath.    Cardiovascular:  Negative for chest pain and leg swelling.   Gastrointestinal:  Positive for abdominal pain. Negative for constipation, diarrhea, nausea and vomiting.   Genitourinary:  Positive for flank pain. Negative for difficulty urinating, dysuria and frequency.   Musculoskeletal:  Negative for back pain and neck pain.   Skin:  Negative for rash and wound.   Neurological:

## 2024-10-04 ENCOUNTER — HOSPITAL ENCOUNTER (OUTPATIENT)
Dept: CT IMAGING | Age: 74
Discharge: HOME | End: 2024-10-04
Attending: INTERNAL MEDICINE
Payer: COMMERCIAL

## 2024-10-04 DIAGNOSIS — J44.89 COPD WITH ASTHMA (HCC): ICD-10-CM

## 2024-10-04 PROCEDURE — 71250 CT THORAX DX C-: CPT

## 2024-10-07 ENCOUNTER — OFFICE VISIT (OUTPATIENT)
Dept: PULMONOLOGY | Age: 74
End: 2024-10-07
Payer: COMMERCIAL

## 2024-10-07 VITALS
HEIGHT: 64 IN | BODY MASS INDEX: 32.88 KG/M2 | HEART RATE: 76 BPM | SYSTOLIC BLOOD PRESSURE: 132 MMHG | DIASTOLIC BLOOD PRESSURE: 84 MMHG | WEIGHT: 192.6 LBS | OXYGEN SATURATION: 96 %

## 2024-10-07 DIAGNOSIS — R91.1 PULMONARY NODULE: ICD-10-CM

## 2024-10-07 DIAGNOSIS — J44.89 COPD WITH ASTHMA (HCC): ICD-10-CM

## 2024-10-07 DIAGNOSIS — K21.9 GASTROESOPHAGEAL REFLUX DISEASE, UNSPECIFIED WHETHER ESOPHAGITIS PRESENT: Primary | ICD-10-CM

## 2024-10-07 PROCEDURE — 3079F DIAST BP 80-89 MM HG: CPT | Performed by: INTERNAL MEDICINE

## 2024-10-07 PROCEDURE — G8427 DOCREV CUR MEDS BY ELIG CLIN: HCPCS | Performed by: INTERNAL MEDICINE

## 2024-10-07 PROCEDURE — G8400 PT W/DXA NO RESULTS DOC: HCPCS | Performed by: INTERNAL MEDICINE

## 2024-10-07 PROCEDURE — 1124F ACP DISCUSS-NO DSCNMKR DOCD: CPT | Performed by: INTERNAL MEDICINE

## 2024-10-07 PROCEDURE — 1036F TOBACCO NON-USER: CPT | Performed by: INTERNAL MEDICINE

## 2024-10-07 PROCEDURE — G8417 CALC BMI ABV UP PARAM F/U: HCPCS | Performed by: INTERNAL MEDICINE

## 2024-10-07 PROCEDURE — G8484 FLU IMMUNIZE NO ADMIN: HCPCS | Performed by: INTERNAL MEDICINE

## 2024-10-07 PROCEDURE — 3075F SYST BP GE 130 - 139MM HG: CPT | Performed by: INTERNAL MEDICINE

## 2024-10-07 PROCEDURE — 3023F SPIROM DOC REV: CPT | Performed by: INTERNAL MEDICINE

## 2024-10-07 PROCEDURE — 3017F COLORECTAL CA SCREEN DOC REV: CPT | Performed by: INTERNAL MEDICINE

## 2024-10-07 PROCEDURE — 99213 OFFICE O/P EST LOW 20 MIN: CPT | Performed by: INTERNAL MEDICINE

## 2024-10-07 PROCEDURE — 1090F PRES/ABSN URINE INCON ASSESS: CPT | Performed by: INTERNAL MEDICINE

## 2024-10-07 RX ORDER — ALBUTEROL SULFATE 90 UG/1
2 INHALANT RESPIRATORY (INHALATION) EVERY 6 HOURS PRN
Qty: 18 G | Refills: 5 | Status: SHIPPED | OUTPATIENT
Start: 2024-10-07

## 2024-10-07 NOTE — PROGRESS NOTES
[facility]  Progress note  MD Zelalem Leeangi Ybarras   YOB: 1950    Date of Visit:  10/7/2024    Assessment/Plan:  1. COPD with asthma/Emphysema  PFT 5/23:  Spirometry:   Flow volume loops were obstructed. The FEV-1/FVC ratio was   decreased. The FEV-1 was 1.12 liters (50% of predicted), which   was severely decreased. The FVC was 1.79 liters (60% of   predicted), which was decreased. Response to inhaled   bronchodilators (albuterol) was not significant.     Lung volumes:   Lung volumes were tested by plethysmography. The total lung   capacity was 4.44 liters (89% of predicted), which was normal.   The residual volume was 2.54 liters (124% of predicted), which   was increased. The ratio of residual volume to total lung   capacity (RV/TLC) was 57, which was increased.     Diffusion capacity was found to be 75% which is Mildly decreased.       CT shows mild emphysema    Interpretation:   Severe obstructive airway disease with no significant   bronchodilator reversibility.     She has been back and forth between Trelegy and Breo. Now on Trelegy full time and that seems to be working    Albuterol prn ==> Refills given    2. Epigastric pain/chest pain/numbness  Seems better now    3. PN  I have independently reviewed radiographic images for this patient as part of this visit.  My impression:  GGO in the RLL has resolved  She does have a new 3mm PN in the RUL    Plan:  Repeat CT in one year      6 months      Chief Complaint   Patient presents with    6 Month Follow-Up    COPD       HPI  The patient presents with a chief complaint of moderate shortness of breath related to mild COPD of many years duration. He has mild associated cough. Exertion is a modifying factor.    Cleaning product odors give her trouble  She had trouble with the inhalers as described above.    Review of Systems  No Chest pain, Nausea or vomiting reported      History  I have

## 2024-11-20 ENCOUNTER — PROCEDURE VISIT (OUTPATIENT)
Dept: NEUROLOGY | Age: 74
End: 2024-11-20

## 2024-11-20 DIAGNOSIS — R20.0 NUMBNESS AND TINGLING OF BOTH FEET: Primary | ICD-10-CM

## 2024-11-20 DIAGNOSIS — R20.2 NUMBNESS AND TINGLING OF BOTH FEET: Primary | ICD-10-CM

## 2024-11-20 NOTE — PROGRESS NOTES
Dear Bennett Carty MD  [address] [id number]      I had the pleasure of seeing your patient Rosa Redding today for EMG and NCV. I have attached a detailed summary below:    Electromyography report      [facility]  [facility], [address]  [place] [id number]      Patient: Rosa Redding    MR Number: [id number]  YOB: 1950  Date of Visit: 11/20/2024      Clinical history:  The patient is a 74 y.o. years old female with chronic feet and leg dysesthesias.  On examination decree sensation in the feet distally but no weakness or atrophy.  2+ DTRs.    Findings:   For full details about such findings, please see attached report. Needle examination was recorded using monopolar needle in selected muscles. Unless otherwise noted, the temperature of the limb was monitored and maintained between 32-36°C during the performance of the NCV testing.     Right peroneal motor distal latency, amplitude and conduction velocity were within normal limits.  2.   Left peroneal motor distal latency, amplitude and conduction velocity were within normal  3.  Right posterior tibial motor distal latency, amplitude and conduction velocity were within normal limit  4.  Left posterior tibial motor distal latency, amplitude and conduction velocity were within normal  5.6  Bilateral sural sensory responses were absent  7.8  Bilateral superficial peroneal sensory responses were absent  9.  Needle examinations of bilateral lower extremities were performed in selected muscles. Needle examination of the selected muscle showed normal insertional activities, morphology, amplitude, and recruitment of motor unit potential.    Impression:    This test is abnormal. 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  No evidence of plexopathy or radiculopathy.      Adilene Natarajan MD    Diplomate of the American board of electrodiagnostics.

## 2024-11-20 NOTE — PATIENT INSTRUCTIONS
Verbal consent was obtained from patient and/or patient's advocate for in office procedure with Dr. Adilene Ramos MD (EMG or EEG).

## 2024-12-10 ENCOUNTER — OFFICE VISIT (OUTPATIENT)
Dept: NEUROLOGY | Age: 74
End: 2024-12-10
Payer: COMMERCIAL

## 2024-12-10 VITALS
HEIGHT: 64 IN | WEIGHT: 192 LBS | BODY MASS INDEX: 32.78 KG/M2 | SYSTOLIC BLOOD PRESSURE: 139 MMHG | DIASTOLIC BLOOD PRESSURE: 73 MMHG | HEART RATE: 83 BPM

## 2024-12-10 DIAGNOSIS — Z79.4 TYPE 2 DIABETES MELLITUS WITH DIABETIC POLYNEUROPATHY, WITH LONG-TERM CURRENT USE OF INSULIN (HCC): Primary | ICD-10-CM

## 2024-12-10 DIAGNOSIS — E11.42 TYPE 2 DIABETES MELLITUS WITH DIABETIC POLYNEUROPATHY, WITH LONG-TERM CURRENT USE OF INSULIN (HCC): Primary | ICD-10-CM

## 2024-12-10 DIAGNOSIS — E13.8 DM (DIABETES MELLITUS), SECONDARY, WITH COMPLICATIONS (HCC): ICD-10-CM

## 2024-12-10 DIAGNOSIS — E53.8 VITAMIN B12 DEFICIENCY NEUROPATHY (HCC): ICD-10-CM

## 2024-12-10 DIAGNOSIS — G63 VITAMIN B12 DEFICIENCY NEUROPATHY (HCC): ICD-10-CM

## 2024-12-10 PROCEDURE — 1124F ACP DISCUSS-NO DSCNMKR DOCD: CPT | Performed by: PSYCHIATRY & NEUROLOGY

## 2024-12-10 PROCEDURE — 3017F COLORECTAL CA SCREEN DOC REV: CPT | Performed by: PSYCHIATRY & NEUROLOGY

## 2024-12-10 PROCEDURE — 2022F DILAT RTA XM EVC RTNOPTHY: CPT | Performed by: PSYCHIATRY & NEUROLOGY

## 2024-12-10 PROCEDURE — G8427 DOCREV CUR MEDS BY ELIG CLIN: HCPCS | Performed by: PSYCHIATRY & NEUROLOGY

## 2024-12-10 PROCEDURE — G8484 FLU IMMUNIZE NO ADMIN: HCPCS | Performed by: PSYCHIATRY & NEUROLOGY

## 2024-12-10 PROCEDURE — G8400 PT W/DXA NO RESULTS DOC: HCPCS | Performed by: PSYCHIATRY & NEUROLOGY

## 2024-12-10 PROCEDURE — 3078F DIAST BP <80 MM HG: CPT | Performed by: PSYCHIATRY & NEUROLOGY

## 2024-12-10 PROCEDURE — 1090F PRES/ABSN URINE INCON ASSESS: CPT | Performed by: PSYCHIATRY & NEUROLOGY

## 2024-12-10 PROCEDURE — G8417 CALC BMI ABV UP PARAM F/U: HCPCS | Performed by: PSYCHIATRY & NEUROLOGY

## 2024-12-10 PROCEDURE — 3046F HEMOGLOBIN A1C LEVEL >9.0%: CPT | Performed by: PSYCHIATRY & NEUROLOGY

## 2024-12-10 PROCEDURE — 99204 OFFICE O/P NEW MOD 45 MIN: CPT | Performed by: PSYCHIATRY & NEUROLOGY

## 2024-12-10 PROCEDURE — 1036F TOBACCO NON-USER: CPT | Performed by: PSYCHIATRY & NEUROLOGY

## 2024-12-10 PROCEDURE — 3075F SYST BP GE 130 - 139MM HG: CPT | Performed by: PSYCHIATRY & NEUROLOGY

## 2024-12-10 RX ORDER — DULOXETIN HYDROCHLORIDE 30 MG/1
30 CAPSULE, DELAYED RELEASE ORAL DAILY
Qty: 30 CAPSULE | Refills: 2 | Status: SHIPPED | OUTPATIENT
Start: 2024-12-10

## 2024-12-10 NOTE — PATIENT INSTRUCTIONS
YOU MUST CONFIRM YOUR APPOINTMENT 1 DAY PRIOR OR IT WILL BE CANCELLED!!   Our office will call you 3 times the day prior to your appointment in an attempt to confirm.  Please return our call ASAP or confirm your appt through [employer] no later than 3 pm the day before your appointment.  If we do not hear back from you by 3 pm to confirm, your appointment will be cancelled & someone will be added into that slot from our wait list.

## 2024-12-10 NOTE — PROGRESS NOTES
with long-term current use of insulin (HCC)  DULoxetine (CYMBALTA) 30 MG extended release capsule      2. DM (diabetes mellitus), secondary, with complications (HCC)        3. Vitamin B12 deficiency neuropathy (HCC)  Vitamin B12 & Folate        Distal diabetic polyneuropathy, severe, not controlled.  Other cause for neuropathy could be B12 deficiency  Hypertension    Plan:  Check B12 and folate.  If low will start B12 injection monthly.  Start Cymbalta 30 mg daily  Side effect discussed  Continue monitor A1c and insulin sliding scale  Aspirin  Continue Cozaar 100 M daily and blood pressure diary at home  Discussed risk of falling with the patient  Diabetic watch at home  Follow LDL  Improving sleep hygiene  2 to 3-month follow-up

## 2024-12-11 DIAGNOSIS — G63 VITAMIN B12 DEFICIENCY NEUROPATHY (HCC): ICD-10-CM

## 2024-12-11 DIAGNOSIS — E53.8 VITAMIN B12 DEFICIENCY NEUROPATHY (HCC): ICD-10-CM

## 2024-12-11 LAB
FOLATE SERPL-MCNC: 11.6 NG/ML (ref 4.78–24.2)
VIT B12 SERPL-MCNC: 156 PG/ML (ref 211–911)

## 2024-12-13 ENCOUNTER — TELEPHONE (OUTPATIENT)
Dept: NEUROLOGY | Age: 74
End: 2024-12-13

## 2024-12-13 NOTE — TELEPHONE ENCOUNTER
Pt states she took the cymbalta right before bedtime. The dizzyness started right away and lasted throughout the next day along with no balance or focus. She states she had the same symptoms with gabapentin but in this case the symptoms were stronger. She has d/c'd cymbalta.

## 2024-12-13 NOTE — TELEPHONE ENCOUNTER
Pt phoned unable to tolerate Cymbalta.  She felt dizzy and felt like a Zombie.  She only took 1 pill but she does not want to continue.  Please call pt back.

## 2024-12-16 RX ORDER — CYANOCOBALAMIN 1000 UG/ML
1000 INJECTION, SOLUTION INTRAMUSCULAR; SUBCUTANEOUS
Qty: 4 ML | Refills: 0 | Status: SHIPPED | OUTPATIENT
Start: 2024-12-16

## 2024-12-19 ENCOUNTER — LAB (OUTPATIENT)
Dept: NEUROLOGY | Age: 74
End: 2024-12-19

## 2024-12-19 DIAGNOSIS — G63 VITAMIN B12 DEFICIENCY NEUROPATHY (HCC): Primary | ICD-10-CM

## 2024-12-19 DIAGNOSIS — E53.8 VITAMIN B12 DEFICIENCY NEUROPATHY (HCC): Primary | ICD-10-CM

## 2024-12-19 RX ORDER — CYANOCOBALAMIN 1000 UG/ML
1000 INJECTION, SOLUTION INTRAMUSCULAR; SUBCUTANEOUS ONCE
Status: COMPLETED | OUTPATIENT
Start: 2024-12-19 | End: 2024-12-19

## 2024-12-19 RX ADMIN — CYANOCOBALAMIN 1000 MCG: 1000 INJECTION, SOLUTION INTRAMUSCULAR; SUBCUTANEOUS at 16:10

## 2024-12-24 ENCOUNTER — APPOINTMENT (OUTPATIENT)
Dept: GENERAL RADIOLOGY | Age: 74
End: 2024-12-24
Payer: COMMERCIAL

## 2024-12-24 ENCOUNTER — APPOINTMENT (OUTPATIENT)
Dept: VASCULAR LAB | Age: 74
End: 2024-12-24
Payer: COMMERCIAL

## 2024-12-24 ENCOUNTER — HOSPITAL ENCOUNTER (EMERGENCY)
Age: 74
Discharge: HOME | End: 2024-12-24
Payer: COMMERCIAL

## 2024-12-24 VITALS
WEIGHT: 190 LBS | HEIGHT: 64 IN | HEART RATE: 80 BPM | BODY MASS INDEX: 32.44 KG/M2 | TEMPERATURE: 97.8 F | DIASTOLIC BLOOD PRESSURE: 69 MMHG | OXYGEN SATURATION: 98 % | SYSTOLIC BLOOD PRESSURE: 154 MMHG | RESPIRATION RATE: 16 BRPM

## 2024-12-24 DIAGNOSIS — M76.30 ILIOTIBIAL BAND SYNDROME, UNSPECIFIED LATERALITY: ICD-10-CM

## 2024-12-24 DIAGNOSIS — S93.402A SPRAIN OF LEFT ANKLE, UNSPECIFIED LIGAMENT, INITIAL ENCOUNTER: Primary | ICD-10-CM

## 2024-12-24 LAB — ECHO BSA: 1.97 M2

## 2024-12-24 PROCEDURE — 93971 EXTREMITY STUDY: CPT | Performed by: INTERNAL MEDICINE

## 2024-12-24 PROCEDURE — 96372 THER/PROPH/DIAG INJ SC/IM: CPT

## 2024-12-24 PROCEDURE — 73562 X-RAY EXAM OF KNEE 3: CPT

## 2024-12-24 PROCEDURE — 99284 EMERGENCY DEPT VISIT MOD MDM: CPT

## 2024-12-24 PROCEDURE — 6360000002 HC RX W HCPCS: Performed by: PHYSICIAN ASSISTANT

## 2024-12-24 PROCEDURE — 73610 X-RAY EXAM OF ANKLE: CPT

## 2024-12-24 PROCEDURE — 93971 EXTREMITY STUDY: CPT

## 2024-12-24 RX ORDER — KETOROLAC TROMETHAMINE 30 MG/ML
30 INJECTION, SOLUTION INTRAMUSCULAR; INTRAVENOUS ONCE
Status: COMPLETED | OUTPATIENT
Start: 2024-12-24 | End: 2024-12-24

## 2024-12-24 RX ORDER — KETOROLAC TROMETHAMINE 10 MG/1
10 TABLET, FILM COATED ORAL EVERY 6 HOURS PRN
Qty: 20 TABLET | Refills: 0 | Status: SHIPPED | OUTPATIENT
Start: 2024-12-24

## 2024-12-24 RX ADMIN — KETOROLAC TROMETHAMINE 30 MG: 30 INJECTION, SOLUTION INTRAMUSCULAR at 10:21

## 2024-12-24 ASSESSMENT — ENCOUNTER SYMPTOMS
DIARRHEA: 0
VOMITING: 0
CONSTIPATION: 0
SORE THROAT: 0
ABDOMINAL PAIN: 0
NAUSEA: 0
BACK PAIN: 0
COUGH: 0
RHINORRHEA: 0
SHORTNESS OF BREATH: 0
EYE PAIN: 0

## 2024-12-24 ASSESSMENT — PAIN DESCRIPTION - ORIENTATION: ORIENTATION: LEFT

## 2024-12-24 ASSESSMENT — PAIN SCALES - GENERAL: PAINLEVEL_OUTOF10: 9

## 2024-12-24 ASSESSMENT — PAIN - FUNCTIONAL ASSESSMENT: PAIN_FUNCTIONAL_ASSESSMENT: 0-10

## 2024-12-24 ASSESSMENT — PAIN DESCRIPTION - LOCATION: LOCATION: ANKLE

## 2024-12-24 ASSESSMENT — PAIN DESCRIPTION - DESCRIPTORS: DESCRIPTORS: DISCOMFORT

## 2024-12-24 NOTE — ED PROVIDER NOTES
[facility] EMERGENCY DEPARTMENT  EMERGENCY DEPARTMENT ENCOUNTER        Pt Name: Rosa Redding  MRN: [id number]  Birthdate 1950  Date of evaluation: 12/24/2024  Provider: LIONEL Fish  PCP: Bennett Carty MD  Note Started: 10:27 AM EST 12/24/24      DEANNA. I have evaluated this patient.        CHIEF COMPLAINT       Chief Complaint   Patient presents with    Knee Pain     Left knee and ankle pain. No known injury.       HISTORY OF PRESENT ILLNESS: 1 or more Elements     History from : Patient    Limitations to history : None    Rosa Redding is a 74 y.o. female who presents to the emergency room due to lateral left knee pain and left ankle pain with pain behind the left knee that is been worsening over the last week.  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  Patient states that is difficult with ambulating because of the pain mainly in the posterior aspect of the knee and lateral aspect of the left ankle.  She states that she took some ibuprofen over the last couple days but has not had any significant improvement.  She denies any chest pain shortness of breath or difficulty breathing.    Nursing Notes were all reviewed and agreed with or any disagreements were addressed in the HPI.    REVIEW OF SYSTEMS :      Review of Systems   Constitutional:  Negative for chills, diaphoresis and fever.   HENT:  Negative for congestion, rhinorrhea and sore throat.    Eyes:  Negative for pain and visual disturbance.   Respiratory:  Negative for cough and shortness of breath.    Cardiovascular:  Negative for chest pain and leg swelling.   Gastrointestinal:  Negative for abdominal pain, constipation, diarrhea, nausea and vomiting.   Genitourinary:  Negative for difficulty urinating, dysuria and frequency.   Musculoskeletal:  Negative for back pain and neck pain.        Left posterior knee pain, left lateral ankle pain.   Skin:   1010   BP: (!) 154/69   Pulse: 80   Resp: 16   Temp: 97.8 °F (36.6 °C)   TempSrc: Oral   SpO2: 98%   Weight: 86.2 kg (190 lb)   Height: 1.626 m (5' 4\")       Patient was given the following medications:  Medications   ketorolac (TORADOL) injection 30 mg (30 mg IntraMUSCular Given 12/24/24 1021)             Is this patient to be included in the SEP-1 Core Measure due to severe sepsis or septic shock?   No   Exclusion criteria - the patient is NOT to be included for SEP-1 Core Measure due to:  Infection is not suspected    CONSULTS: (Who and What was discussed)  None  Discussion with Other Profesionals : None    Social Determinants : None    Records Reviewed : None    CC/HPI Summary, DDx, ED Course, and Reassessment:  74 y.o. female who presents to the emergency room due to lateral left knee pain and left ankle pain with pain behind the left knee that is been worsening over the last week.  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  Patient states that is difficult with ambulating because of the pain mainly in the posterior aspect of the knee and lateral aspect of the left ankle.  She states that she took some ibuprofen over the last couple days but has not had any significant improvement.  She denies any chest pain shortness of breath or difficulty breathing.    On exam she does have tenderness of the lateral malleolus of the left ankle as well as tenderness along the lateral aspect of the left knee and left hip.  She is ambulatory here in the emergency department.  Distal pulses are intact.  She has good sensation to light touch at the bilateral lower extremities.  There is no saddle paresthesias or urinary bowel incontinence reported by the patient.  She does have some mild swelling noted about the left lower leg and for this reason vascular ultrasound was ordered.    Vascular ultrasound did not show any evidence of DVT.  X-ray of the left ankle and

## 2024-12-26 ENCOUNTER — LAB (OUTPATIENT)
Dept: NEUROLOGY | Age: 74
End: 2024-12-26
Payer: COMMERCIAL

## 2024-12-26 DIAGNOSIS — E53.8 VITAMIN B12 DEFICIENCY NEUROPATHY (HCC): Primary | ICD-10-CM

## 2024-12-26 DIAGNOSIS — G63 VITAMIN B12 DEFICIENCY NEUROPATHY (HCC): Primary | ICD-10-CM

## 2024-12-26 PROCEDURE — 96372 THER/PROPH/DIAG INJ SC/IM: CPT | Performed by: PSYCHIATRY & NEUROLOGY

## 2024-12-26 RX ORDER — CYANOCOBALAMIN 1000 UG/ML
1000 INJECTION, SOLUTION INTRAMUSCULAR; SUBCUTANEOUS ONCE
Status: COMPLETED | OUTPATIENT
Start: 2024-12-26 | End: 2024-12-26

## 2024-12-26 RX ADMIN — CYANOCOBALAMIN 1000 MCG: 1000 INJECTION, SOLUTION INTRAMUSCULAR; SUBCUTANEOUS at 10:25

## 2025-01-02 ENCOUNTER — LAB (OUTPATIENT)
Dept: NEUROLOGY | Age: 75
End: 2025-01-02

## 2025-01-02 DIAGNOSIS — E53.8 VITAMIN B12 DEFICIENCY NEUROPATHY (HCC): Primary | ICD-10-CM

## 2025-01-02 DIAGNOSIS — G63 VITAMIN B12 DEFICIENCY NEUROPATHY (HCC): Primary | ICD-10-CM

## 2025-01-02 RX ORDER — CYANOCOBALAMIN 1000 UG/ML
1000 INJECTION, SOLUTION INTRAMUSCULAR; SUBCUTANEOUS ONCE
Status: COMPLETED | OUTPATIENT
Start: 2025-01-02 | End: 2025-01-02

## 2025-01-02 RX ADMIN — CYANOCOBALAMIN 1000 MCG: 1000 INJECTION, SOLUTION INTRAMUSCULAR; SUBCUTANEOUS at 10:42

## 2025-01-10 ENCOUNTER — TELEPHONE (OUTPATIENT)
Dept: NEUROLOGY | Age: 75
End: 2025-01-10

## 2025-01-11 DIAGNOSIS — E53.8 VITAMIN B12 DEFICIENCY NEUROPATHY (HCC): ICD-10-CM

## 2025-01-11 DIAGNOSIS — G63 VITAMIN B12 DEFICIENCY NEUROPATHY (HCC): ICD-10-CM

## 2025-01-13 RX ORDER — CYANOCOBALAMIN 1000 UG/ML
1000 INJECTION, SOLUTION INTRAMUSCULAR; SUBCUTANEOUS
Qty: 6 ML | Refills: 0 | Status: SHIPPED | OUTPATIENT
Start: 2025-01-13

## 2025-01-16 ENCOUNTER — LAB (OUTPATIENT)
Dept: NEUROLOGY | Age: 75
End: 2025-01-16

## 2025-01-16 DIAGNOSIS — E53.8 B12 DEFICIENCY: ICD-10-CM

## 2025-01-16 DIAGNOSIS — E53.8 VITAMIN B12 DEFICIENCY NEUROPATHY (HCC): Primary | ICD-10-CM

## 2025-01-16 DIAGNOSIS — G63 VITAMIN B12 DEFICIENCY NEUROPATHY (HCC): Primary | ICD-10-CM

## 2025-01-16 RX ORDER — CYANOCOBALAMIN 1000 UG/ML
1000 INJECTION, SOLUTION INTRAMUSCULAR; SUBCUTANEOUS ONCE
Status: COMPLETED | OUTPATIENT
Start: 2025-01-16 | End: 2025-01-16

## 2025-01-16 RX ADMIN — CYANOCOBALAMIN 1000 MCG: 1000 INJECTION, SOLUTION INTRAMUSCULAR; SUBCUTANEOUS at 10:00

## 2025-01-27 ENCOUNTER — TRANSCRIBE ORDERS (OUTPATIENT)
Dept: ADMINISTRATIVE | Age: 75
End: 2025-01-27

## 2025-01-27 DIAGNOSIS — M25.562 LEFT KNEE PAIN, UNSPECIFIED CHRONICITY: Primary | ICD-10-CM

## 2025-02-03 ENCOUNTER — HOSPITAL ENCOUNTER (EMERGENCY)
Age: 75
Discharge: HOME | End: 2025-02-03
Attending: EMERGENCY MEDICINE
Payer: COMMERCIAL

## 2025-02-03 ENCOUNTER — APPOINTMENT (OUTPATIENT)
Dept: CT IMAGING | Age: 75
End: 2025-02-03
Payer: COMMERCIAL

## 2025-02-03 VITALS
HEART RATE: 67 BPM | OXYGEN SATURATION: 94 % | DIASTOLIC BLOOD PRESSURE: 70 MMHG | TEMPERATURE: 98 F | RESPIRATION RATE: 18 BRPM | SYSTOLIC BLOOD PRESSURE: 178 MMHG

## 2025-02-03 DIAGNOSIS — M17.12 OSTEOARTHRITIS OF LEFT KNEE, UNSPECIFIED OSTEOARTHRITIS TYPE: ICD-10-CM

## 2025-02-03 DIAGNOSIS — M79.605 LEFT LEG PAIN: Primary | ICD-10-CM

## 2025-02-03 PROCEDURE — 73700 CT LOWER EXTREMITY W/O DYE: CPT

## 2025-02-03 PROCEDURE — 99284 EMERGENCY DEPT VISIT MOD MDM: CPT

## 2025-02-03 PROCEDURE — 6370000000 HC RX 637 (ALT 250 FOR IP): Performed by: EMERGENCY MEDICINE

## 2025-02-03 RX ORDER — METHYLPREDNISOLONE 4 MG/1
TABLET ORAL
Qty: 1 KIT | Refills: 0 | Status: SHIPPED | OUTPATIENT
Start: 2025-02-03 | End: 2025-02-09

## 2025-02-03 RX ORDER — NAPROXEN 250 MG/1
250 TABLET ORAL 2 TIMES DAILY WITH MEALS
Qty: 14 TABLET | Refills: 0 | Status: SHIPPED | OUTPATIENT
Start: 2025-02-03 | End: 2025-02-10

## 2025-02-03 RX ORDER — METHOCARBAMOL 500 MG/1
750 TABLET, FILM COATED ORAL ONCE
Status: COMPLETED | OUTPATIENT
Start: 2025-02-03 | End: 2025-02-03

## 2025-02-03 RX ORDER — HYDROCODONE BITARTRATE AND ACETAMINOPHEN 5; 325 MG/1; MG/1
1 TABLET ORAL
Status: COMPLETED | OUTPATIENT
Start: 2025-02-03 | End: 2025-02-03

## 2025-02-03 RX ADMIN — METHOCARBAMOL 750 MG: 500 TABLET ORAL at 04:35

## 2025-02-03 RX ADMIN — HYDROCODONE BITARTRATE AND ACETAMINOPHEN 1 TABLET: 5; 325 TABLET ORAL at 04:35

## 2025-02-03 ASSESSMENT — PAIN - FUNCTIONAL ASSESSMENT
PAIN_FUNCTIONAL_ASSESSMENT: PREVENTS OR INTERFERES SOME ACTIVE ACTIVITIES AND ADLS
PAIN_FUNCTIONAL_ASSESSMENT: 0-10
PAIN_FUNCTIONAL_ASSESSMENT: 0-10

## 2025-02-03 ASSESSMENT — PAIN SCALES - GENERAL
PAINLEVEL_OUTOF10: 9
PAINLEVEL_OUTOF10: 7

## 2025-02-03 ASSESSMENT — PAIN DESCRIPTION - ORIENTATION: ORIENTATION: LEFT

## 2025-02-03 ASSESSMENT — PAIN DESCRIPTION - PAIN TYPE: TYPE: ACUTE PAIN;CHRONIC PAIN

## 2025-02-03 ASSESSMENT — PAIN DESCRIPTION - LOCATION: LOCATION: LEG

## 2025-02-03 NOTE — ED PROVIDER NOTES
Emergency Department Encounter  Location: [facility] EMERGENCY DEPARTMENT    Patient: Rosa Redding  MRN: [id number]  : 1950  Date of evaluation: 2/3/2025  ED Provider: RUBÉN TREVINO DO    6:00a.m.  Rosa Redding was checked out to me by Dr. Michele. Please see his/her initial documentation for details of the patient's initial ED presentation, physical exam and completed studies.    In brief, Rosa Redding is a 74 y.o. female that presented to the emergency department left knee pain.  She has had the pain for several weeks.  She states that the pain is getting worse.  She states that it starts in her ankle and travels up to her knee.  Patient has been seen by [facility] and they are treating this conservatively.  Patient has an MRI scheduled tomorrow.  Patient was seen in the ER on 2025 for similar symptoms.  At that time, she told the provider that she has had the symptoms for over a week.  Patient was seen on  of last year with the same complaints.  Patient at that time had an x-ray of the left ankle and knee.  An ultrasound of the left leg which did not show evidence of DVT.  Patient was seen by her primary care physician and an MRI was ordered on the  of last month.  Patient does not appear to be in acute distress.  When I reevaluated the patient she was sleeping.    I have reviewed and interpreted all of the currently available lab results and diagnostics from this visit:        Screening:       Labs  No results found for this visit on 25.    Imaging  CT TIBIA FIBULA LEFT WO CONTRAST   Final Result   1. No acute fracture of the left knee, tibia/fibula, or ankle.  Evaluation   for occult fracture is limited by osteopenia.   2. Severe patellofemoral compartment predominant tricompartmental   osteoarthritis of the left knee.   3. Heterogeneous sclerosis in the distal tibial diaphysis measuring up to 4.7   cm, present since at least 2015, likely a benign 
infection    Bactrim [Sulfamethoxazole-Trimethoprim] Other (See Comments)     Yeast infection    Lisinopril Other (See Comments)     cough       PHYSICAL EXAM  VITAL SIGNS:    ED Triage Vitals [02/03/25 0407]   Encounter Vitals Group      BP (!) 180/88      Systolic BP Percentile       Diastolic BP Percentile       Pulse 75      Respirations 18      Temp 98 °F (36.7 °C)      Temp Source Oral      SpO2 94 %      Weight       Height       Head Circumference       Peak Flow       Pain Score       Pain Loc       Pain Education       Exclude from Growth Chart       Physical Exam  Vitals and nursing note reviewed.   Constitutional:       General: She is not in acute distress.     Appearance: She is not ill-appearing or toxic-appearing.   HENT:      Head: Normocephalic and atraumatic.      Mouth/Throat:      Mouth: Mucous membranes are moist.   Cardiovascular:      Rate and Rhythm: Normal rate and regular rhythm.   Pulmonary:      Effort: Pulmonary effort is normal. No respiratory distress.   Musculoskeletal:      Comments: Diffuse tenderness of left knee and tib-fib and ankle without deformity.  Neurovascular intact distally.   Skin:     General: Skin is warm and dry.      Findings: No rash.   Neurological:      General: No focal deficit present.      Mental Status: She is alert and oriented to person, place, and time.   Psychiatric:         Mood and Affect: Mood normal.         Behavior: Behavior normal.        BRIEF DIFFERENTIAL DIAGNOSIS  1.  Occult fracture  2.  Sprain  3.  Soft tissue injury    INDEPENDENT EKG INTERPRETATION:  None    LABS  I have personally reviewed all labs for this visit.   No results found for this visit on 02/03/25.    RADIOLOGY  Any applicable radiology studies including x-ray, CT, MRI, and/or ultrasound, were reviewed independently by me in addition to the radiologist.  I reviewed all radiology images and reports as well from this evaluation.  CT KNEE LEFT WO CONTRAST    (Results Pending)   CT

## 2025-02-04 ENCOUNTER — HOSPITAL ENCOUNTER (OUTPATIENT)
Dept: MRI IMAGING | Age: 75
Discharge: HOME | End: 2025-02-04
Attending: INTERNAL MEDICINE
Payer: MEDICARE

## 2025-02-04 DIAGNOSIS — M25.562 LEFT KNEE PAIN, UNSPECIFIED CHRONICITY: ICD-10-CM

## 2025-02-04 PROCEDURE — 73721 MRI JNT OF LWR EXTRE W/O DYE: CPT

## 2025-02-13 ENCOUNTER — APPOINTMENT (OUTPATIENT)
Dept: GENERAL RADIOLOGY | Age: 75
End: 2025-02-13
Payer: MEDICARE

## 2025-02-13 ENCOUNTER — HOSPITAL ENCOUNTER (EMERGENCY)
Age: 75
Discharge: HOME | End: 2025-02-13
Payer: MEDICARE

## 2025-02-13 VITALS
OXYGEN SATURATION: 94 % | DIASTOLIC BLOOD PRESSURE: 60 MMHG | TEMPERATURE: 97.4 F | SYSTOLIC BLOOD PRESSURE: 131 MMHG | RESPIRATION RATE: 23 BRPM | BODY MASS INDEX: 31.65 KG/M2 | HEART RATE: 94 BPM | HEIGHT: 65 IN | WEIGHT: 190 LBS

## 2025-02-13 DIAGNOSIS — J44.1 COPD EXACERBATION (HCC): ICD-10-CM

## 2025-02-13 DIAGNOSIS — J10.1 INFLUENZA A: Primary | ICD-10-CM

## 2025-02-13 LAB
ALBUMIN SERPL-MCNC: 4.4 G/DL (ref 3.4–5)
ALBUMIN/GLOB SERPL: 1.6 {RATIO} (ref 1.1–2.2)
ALP SERPL-CCNC: 94 U/L (ref 40–129)
ALT SERPL-CCNC: 13 U/L (ref 10–40)
ANION GAP SERPL CALCULATED.3IONS-SCNC: 11 MMOL/L (ref 3–16)
AST SERPL-CCNC: 19 U/L (ref 15–37)
BASOPHILS # BLD: 0 K/UL (ref 0–0.2)
BASOPHILS NFR BLD: 0.3 %
BILIRUB SERPL-MCNC: 0.8 MG/DL (ref 0–1)
BUN SERPL-MCNC: 11 MG/DL (ref 7–20)
CALCIUM SERPL-MCNC: 9 MG/DL (ref 8.3–10.6)
CHLORIDE SERPL-SCNC: 99 MMOL/L (ref 99–110)
CO2 SERPL-SCNC: 24 MMOL/L (ref 21–32)
CREAT SERPL-MCNC: 0.7 MG/DL (ref 0.6–1.2)
DEPRECATED RDW RBC AUTO: 13.2 % (ref 12.4–15.4)
EKG ATRIAL RATE: 98 BPM
EKG DIAGNOSIS: NORMAL
EKG P AXIS: 51 DEGREES
EKG P-R INTERVAL: 158 MS
EKG Q-T INTERVAL: 346 MS
EKG QRS DURATION: 72 MS
EKG QTC CALCULATION (BAZETT): 441 MS
EKG R AXIS: 41 DEGREES
EKG T AXIS: 31 DEGREES
EKG VENTRICULAR RATE: 98 BPM
EOSINOPHIL # BLD: 0 K/UL (ref 0–0.6)
EOSINOPHIL NFR BLD: 0.4 %
FLUAV RNA RESP QL NAA+PROBE: DETECTED
FLUBV RNA RESP QL NAA+PROBE: NOT DETECTED
GFR SERPLBLD CREATININE-BSD FMLA CKD-EPI: >90 ML/MIN/{1.73_M2}
GLUCOSE SERPL-MCNC: 219 MG/DL (ref 70–99)
HCT VFR BLD AUTO: 41 % (ref 36–48)
HGB BLD-MCNC: 14.3 G/DL (ref 12–16)
LYMPHOCYTES # BLD: 0.6 K/UL (ref 1–5.1)
LYMPHOCYTES NFR BLD: 10 %
MCH RBC QN AUTO: 28.7 PG (ref 26–34)
MCHC RBC AUTO-ENTMCNC: 34.9 G/DL (ref 31–36)
MCV RBC AUTO: 82.3 FL (ref 80–100)
MONOCYTES # BLD: 0.7 K/UL (ref 0–1.3)
MONOCYTES NFR BLD: 11.4 %
NEUTROPHILS # BLD: 5 K/UL (ref 1.7–7.7)
NEUTROPHILS NFR BLD: 77.9 %
PLATELET # BLD AUTO: 178 K/UL (ref 135–450)
PMV BLD AUTO: 7.3 FL (ref 5–10.5)
POTASSIUM SERPL-SCNC: 4.2 MMOL/L (ref 3.5–5.1)
PROT SERPL-MCNC: 7.2 G/DL (ref 6.4–8.2)
RBC # BLD AUTO: 4.98 M/UL (ref 4–5.2)
SARS-COV-2 RNA RESP QL NAA+PROBE: NOT DETECTED
SODIUM SERPL-SCNC: 134 MMOL/L (ref 136–145)
TROPONIN, HIGH SENSITIVITY: <6 NG/L (ref 0–14)
TROPONIN, HIGH SENSITIVITY: <6 NG/L (ref 0–14)
WBC # BLD AUTO: 6.4 K/UL (ref 4–11)

## 2025-02-13 PROCEDURE — 71045 X-RAY EXAM CHEST 1 VIEW: CPT

## 2025-02-13 PROCEDURE — 87636 SARSCOV2 & INF A&B AMP PRB: CPT

## 2025-02-13 PROCEDURE — 93005 ELECTROCARDIOGRAM TRACING: CPT | Performed by: EMERGENCY MEDICINE

## 2025-02-13 PROCEDURE — 94640 AIRWAY INHALATION TREATMENT: CPT

## 2025-02-13 PROCEDURE — 6370000000 HC RX 637 (ALT 250 FOR IP): Performed by: PHYSICIAN ASSISTANT

## 2025-02-13 PROCEDURE — 85025 COMPLETE CBC W/AUTO DIFF WBC: CPT

## 2025-02-13 PROCEDURE — 80053 COMPREHEN METABOLIC PANEL: CPT

## 2025-02-13 PROCEDURE — 6360000002 HC RX W HCPCS: Performed by: PHYSICIAN ASSISTANT

## 2025-02-13 PROCEDURE — 94761 N-INVAS EAR/PLS OXIMETRY MLT: CPT

## 2025-02-13 PROCEDURE — 93010 ELECTROCARDIOGRAM REPORT: CPT | Performed by: INTERNAL MEDICINE

## 2025-02-13 PROCEDURE — 84484 ASSAY OF TROPONIN QUANT: CPT

## 2025-02-13 PROCEDURE — 99285 EMERGENCY DEPT VISIT HI MDM: CPT

## 2025-02-13 RX ORDER — OSELTAMIVIR PHOSPHATE 75 MG/1
75 CAPSULE ORAL 2 TIMES DAILY
Qty: 10 CAPSULE | Refills: 0 | Status: SHIPPED | OUTPATIENT
Start: 2025-02-13 | End: 2025-02-18

## 2025-02-13 RX ORDER — ALBUTEROL SULFATE 90 UG/1
2 INHALANT RESPIRATORY (INHALATION) 4 TIMES DAILY PRN
Qty: 18 G | Refills: 0 | Status: SHIPPED | OUTPATIENT
Start: 2025-02-13

## 2025-02-13 RX ORDER — ALBUTEROL SULFATE 0.83 MG/ML
2.5 SOLUTION RESPIRATORY (INHALATION) ONCE
Status: COMPLETED | OUTPATIENT
Start: 2025-02-13 | End: 2025-02-13

## 2025-02-13 RX ORDER — IPRATROPIUM BROMIDE AND ALBUTEROL SULFATE 2.5; .5 MG/3ML; MG/3ML
1 SOLUTION RESPIRATORY (INHALATION) ONCE
Status: COMPLETED | OUTPATIENT
Start: 2025-02-13 | End: 2025-02-13

## 2025-02-13 RX ADMIN — ALBUTEROL SULFATE 2.5 MG: 2.5 SOLUTION RESPIRATORY (INHALATION) at 09:01

## 2025-02-13 RX ADMIN — IPRATROPIUM BROMIDE AND ALBUTEROL SULFATE 1 DOSE: .5; 3 SOLUTION RESPIRATORY (INHALATION) at 09:01

## 2025-02-13 ASSESSMENT — PAIN - FUNCTIONAL ASSESSMENT: PAIN_FUNCTIONAL_ASSESSMENT: NONE - DENIES PAIN

## 2025-02-13 ASSESSMENT — ENCOUNTER SYMPTOMS
COUGH: 1
SHORTNESS OF BREATH: 1

## 2025-02-13 ASSESSMENT — LIFESTYLE VARIABLES: HOW OFTEN DO YOU HAVE A DRINK CONTAINING ALCOHOL: NEVER

## 2025-02-13 NOTE — ED PROVIDER NOTES
[facility] EMERGENCY DEPARTMENT  EMERGENCY DEPARTMENT ENCOUNTER        Pt Name: Rosa Redding  MRN: [id number]  Birthdate 1950  Date of evaluation: 2/13/2025  Provider: LIONEL Fish  PCP: Bennett Carty MD  Note Started: 3:13 PM EST 2/13/25      DEANNA. I have evaluated this patient.        CHIEF COMPLAINT       Chief Complaint   Patient presents with    Shortness of Breath     Dizzy started yesterday comes and goes.  SOB for a couple of days.        HISTORY OF PRESENT ILLNESS: 1 or more Elements     History From: Patient  Limitations to history : None    Rosa Redding is a 74 y.o. female who presents to the emergency room due to shortness of breath with history of COPD.  Patient states that she was around her grandkids recently who were recently sick she is also been having congestion and and cough.  Patient has had a decrease in appetite because of this as well.  She states that the symptoms started recently.    Nursing Notes were all reviewed and agreed with or any disagreements were addressed in the HPI.    REVIEW OF SYSTEMS :      Review of Systems   Constitutional:  Positive for fatigue.   HENT:  Positive for congestion.    Respiratory:  Positive for cough and shortness of breath.        Positives and Pertinent negatives as per HPI.     SURGICAL HISTORY     Past Surgical History:   Procedure Laterality Date    CORONARY ANGIOPLASTY WITH STENT PLACEMENT      TONSILLECTOMY      UPPER GASTROINTESTINAL ENDOSCOPY N/A 6/29/2022    EGD BIOPSY performed by Colt Aguilera MD at [facility] ENDOSCOPY    WRIST FRACTURE SURGERY Left     3 years ago       CURRENTMEDICATIONS       Discharge Medication List as of 2/13/2025 10:35 AM        CONTINUE these medications which have NOT CHANGED    Details   naproxen (NAPROSYN) 250 MG tablet Take 1 tablet by mouth 2 times daily (with meals) for 7 days, Disp-14 tablet, R-0Normal      cyanocobalamin 1000 MCG/ML injection Inject 1 mL into the muscle every 30 days,

## 2025-03-10 ENCOUNTER — OFFICE VISIT (OUTPATIENT)
Dept: NEUROLOGY | Age: 75
End: 2025-03-10
Payer: MEDICARE

## 2025-03-10 VITALS
HEIGHT: 65 IN | DIASTOLIC BLOOD PRESSURE: 84 MMHG | BODY MASS INDEX: 31.65 KG/M2 | WEIGHT: 190 LBS | HEART RATE: 74 BPM | SYSTOLIC BLOOD PRESSURE: 139 MMHG

## 2025-03-10 DIAGNOSIS — E53.8 VITAMIN B12 DEFICIENCY NEUROPATHY: ICD-10-CM

## 2025-03-10 DIAGNOSIS — R20.0 NUMBNESS AND TINGLING OF BOTH FEET: ICD-10-CM

## 2025-03-10 DIAGNOSIS — M54.16 LUMBAR BACK PAIN WITH RADICULOPATHY AFFECTING LEFT LOWER EXTREMITY: ICD-10-CM

## 2025-03-10 DIAGNOSIS — Z79.4 TYPE 2 DIABETES MELLITUS WITH DIABETIC POLYNEUROPATHY, WITH LONG-TERM CURRENT USE OF INSULIN (HCC): Primary | ICD-10-CM

## 2025-03-10 DIAGNOSIS — G63 VITAMIN B12 DEFICIENCY NEUROPATHY: ICD-10-CM

## 2025-03-10 DIAGNOSIS — R20.2 NUMBNESS AND TINGLING OF BOTH FEET: ICD-10-CM

## 2025-03-10 DIAGNOSIS — E11.42 TYPE 2 DIABETES MELLITUS WITH DIABETIC POLYNEUROPATHY, WITH LONG-TERM CURRENT USE OF INSULIN (HCC): Primary | ICD-10-CM

## 2025-03-10 PROCEDURE — 1160F RVW MEDS BY RX/DR IN RCRD: CPT | Performed by: PSYCHIATRY & NEUROLOGY

## 2025-03-10 PROCEDURE — 3075F SYST BP GE 130 - 139MM HG: CPT | Performed by: PSYCHIATRY & NEUROLOGY

## 2025-03-10 PROCEDURE — 99213 OFFICE O/P EST LOW 20 MIN: CPT | Performed by: PSYCHIATRY & NEUROLOGY

## 2025-03-10 PROCEDURE — 3079F DIAST BP 80-89 MM HG: CPT | Performed by: PSYCHIATRY & NEUROLOGY

## 2025-03-10 PROCEDURE — 1159F MED LIST DOCD IN RCRD: CPT | Performed by: PSYCHIATRY & NEUROLOGY

## 2025-03-10 PROCEDURE — 1124F ACP DISCUSS-NO DSCNMKR DOCD: CPT | Performed by: PSYCHIATRY & NEUROLOGY

## 2025-03-10 RX ORDER — GABAPENTIN 100 MG/1
100 CAPSULE ORAL 3 TIMES DAILY
COMMUNITY
Start: 2024-11-26

## 2025-03-10 NOTE — PROGRESS NOTES
The patient came today for follow up regarding: polyneuropathy     Since the patient's last visit, she tried Cymbalta with no improvement.  She is now back on gabapentin up to 200 M x 3.  Mild to moderate improvement.  Symptoms worse with activity.  Describes feet numbness and tingling with burning sensation can go up to her knees.  Worse with activity.  Does have chronic back pain.  She is diabetic.  A1c was 7.8.  No recent falling.  No worsening of her back pain.  No bladder or bowel issues.  She is on the same dose of Cozaar.  Received monthly B12 injection.  Last B12 was 156.      Exam:   Constitutional:   Vitals:    03/10/25 1523   BP: 139/84   Pulse: 74   Weight: 86.2 kg (190 lb)   Height: 1.651 m (5' 5\")       General appearance:  Normal development and appear in no acute distress.   Mental Status:   Oriented to person, place, problem, and time.    Memory: Good immediate recall.  Intact remote memory  Normal attention span and concentration.  Language: intact naming, repeating and fluency   Good fund of Knowledge. Aware of current events and vocabulary   Cranial Nerves: Pupil round regular and reactive, extraocular muscles were intact, no ophthalmoplegia, face is symmetric, tongue is midline and rest of cranial nerves are normal    Musculoskeletal: no focal weakness in UE or LL.   Reflexes: Symmetric 2+ in both arms and 1 I in his legs  Coordination:no abnormal movements or dysmetria  Sensation: normal in all extremities, mild decrease sensation in distal feet to vibration  Gait/Posture: steady gait with normal posturing and station.     ROS : A 10-14 system review of constitutional, cardiovascular, respiratory, GI, eyes, , ENT, musculoskeletal, endocrine, hematological, skin, SHEENT, genitourinary, psychiatric and neurologic systems was obtained and updated today which is unremarkable except as mentioned in my HPI      Medical decision making:    A. Problems (any 1)    High:    [] Acute/Chronic

## 2025-03-10 NOTE — PATIENT INSTRUCTIONS
YOU MUST CONFIRM YOUR APPOINTMENT 1 DAY PRIOR OR IT WILL BE CANCELLED!!   Our office will call you 3 times the day prior to your appointment in an attempt to confirm.  Please return our call ASAP or confirm your appt through [employer] no later than 3 pm the day before your appointment.  If we do not hear back from you by 3 pm to confirm, your appointment will be cancelled & someone will be added into that slot from our wait list.

## 2025-04-07 ENCOUNTER — OFFICE VISIT (OUTPATIENT)
Dept: PULMONOLOGY | Age: 75
End: 2025-04-07
Payer: MEDICARE

## 2025-04-07 ENCOUNTER — LAB (OUTPATIENT)
Dept: NEUROLOGY | Age: 75
End: 2025-04-07
Payer: MEDICARE

## 2025-04-07 VITALS
DIASTOLIC BLOOD PRESSURE: 80 MMHG | OXYGEN SATURATION: 96 % | HEIGHT: 65 IN | WEIGHT: 187.8 LBS | HEART RATE: 77 BPM | SYSTOLIC BLOOD PRESSURE: 122 MMHG | BODY MASS INDEX: 31.29 KG/M2

## 2025-04-07 DIAGNOSIS — R91.1 PULMONARY NODULE: ICD-10-CM

## 2025-04-07 DIAGNOSIS — J44.89 COPD WITH ASTHMA (HCC): ICD-10-CM

## 2025-04-07 DIAGNOSIS — R07.89 OTHER CHEST PAIN: ICD-10-CM

## 2025-04-07 DIAGNOSIS — J43.2 CENTRILOBULAR EMPHYSEMA (HCC): Primary | ICD-10-CM

## 2025-04-07 DIAGNOSIS — E53.8 B12 DEFICIENCY: Primary | ICD-10-CM

## 2025-04-07 DIAGNOSIS — K21.9 GASTROESOPHAGEAL REFLUX DISEASE, UNSPECIFIED WHETHER ESOPHAGITIS PRESENT: ICD-10-CM

## 2025-04-07 PROCEDURE — 96372 THER/PROPH/DIAG INJ SC/IM: CPT | Performed by: PSYCHIATRY & NEUROLOGY

## 2025-04-07 PROCEDURE — 99214 OFFICE O/P EST MOD 30 MIN: CPT | Performed by: INTERNAL MEDICINE

## 2025-04-07 PROCEDURE — 3074F SYST BP LT 130 MM HG: CPT | Performed by: INTERNAL MEDICINE

## 2025-04-07 PROCEDURE — 1159F MED LIST DOCD IN RCRD: CPT | Performed by: INTERNAL MEDICINE

## 2025-04-07 PROCEDURE — 1124F ACP DISCUSS-NO DSCNMKR DOCD: CPT | Performed by: INTERNAL MEDICINE

## 2025-04-07 PROCEDURE — 3079F DIAST BP 80-89 MM HG: CPT | Performed by: INTERNAL MEDICINE

## 2025-04-07 PROCEDURE — 1160F RVW MEDS BY RX/DR IN RCRD: CPT | Performed by: INTERNAL MEDICINE

## 2025-04-07 RX ORDER — FLUTICASONE FUROATE, UMECLIDINIUM BROMIDE AND VILANTEROL TRIFENATATE 200; 62.5; 25 UG/1; UG/1; UG/1
1 POWDER RESPIRATORY (INHALATION) DAILY
Qty: 1 EACH | Refills: 5 | Status: SHIPPED | OUTPATIENT
Start: 2025-04-07

## 2025-04-07 RX ORDER — CYANOCOBALAMIN 1000 UG/ML
1000 INJECTION, SOLUTION INTRAMUSCULAR; SUBCUTANEOUS ONCE
Status: COMPLETED | OUTPATIENT
Start: 2025-04-07 | End: 2025-04-07

## 2025-04-07 RX ORDER — ALBUTEROL SULFATE 90 UG/1
2 INHALANT RESPIRATORY (INHALATION) EVERY 6 HOURS PRN
Qty: 18 G | Refills: 5 | Status: SHIPPED | OUTPATIENT
Start: 2025-04-07

## 2025-04-07 RX ADMIN — CYANOCOBALAMIN 1000 MCG: 1000 INJECTION, SOLUTION INTRAMUSCULAR; SUBCUTANEOUS at 09:53

## 2025-04-07 NOTE — PROGRESS NOTES
Position: Sitting   BP Cuff Size: Medium Adult   Pulse: 77   SpO2: 96%   Weight: 85.2 kg (187 lb 12.8 oz)   Height: 1.651 m (5' 5\")     Body mass index is 31.25 kg/m².     Wt Readings from Last 3 Encounters:   25 85.2 kg (187 lb 12.8 oz)   03/10/25 86.2 kg (190 lb)   25 86.2 kg (190 lb)     BP Readings from Last 3 Encounters:   25 122/80   03/10/25 139/84   25 131/60        Social History     Tobacco Use   Smoking Status Former    Current packs/day: 0.00    Types: Cigarettes    Quit date: 2002    Years since quittin.7   Smokeless Tobacco Never

## 2025-05-01 ENCOUNTER — TRANSCRIBE ORDERS (OUTPATIENT)
Dept: ADMINISTRATIVE | Age: 75
End: 2025-05-01

## 2025-05-01 DIAGNOSIS — M54.16 RADICULOPATHY, LUMBAR REGION: Primary | ICD-10-CM

## 2025-05-07 ENCOUNTER — HOSPITAL ENCOUNTER (OUTPATIENT)
Dept: MRI IMAGING | Age: 75
Discharge: HOME | End: 2025-05-07
Attending: INTERNAL MEDICINE
Payer: MEDICARE

## 2025-05-07 DIAGNOSIS — M54.16 RADICULOPATHY, LUMBAR REGION: ICD-10-CM

## 2025-05-07 PROCEDURE — 72148 MRI LUMBAR SPINE W/O DYE: CPT

## 2025-08-21 ENCOUNTER — HOSPITAL ENCOUNTER (EMERGENCY)
Age: 75
Discharge: HOME | End: 2025-08-21
Attending: EMERGENCY MEDICINE
Payer: MEDICARE

## 2025-08-21 VITALS
OXYGEN SATURATION: 97 % | HEART RATE: 70 BPM | DIASTOLIC BLOOD PRESSURE: 89 MMHG | HEIGHT: 65 IN | BODY MASS INDEX: 31.46 KG/M2 | WEIGHT: 188.8 LBS | SYSTOLIC BLOOD PRESSURE: 204 MMHG | TEMPERATURE: 97.8 F | RESPIRATION RATE: 16 BRPM

## 2025-08-21 DIAGNOSIS — R06.02 SHORTNESS OF BREATH: Primary | ICD-10-CM

## 2025-08-21 DIAGNOSIS — Z87.09 HISTORY OF COPD: ICD-10-CM

## 2025-08-21 DIAGNOSIS — I10 ELEVATED BLOOD PRESSURE READING IN OFFICE WITH DIAGNOSIS OF HYPERTENSION: ICD-10-CM

## 2025-08-21 LAB
EKG ATRIAL RATE: 71 BPM
EKG DIAGNOSIS: NORMAL
EKG P AXIS: 67 DEGREES
EKG P-R INTERVAL: 182 MS
EKG Q-T INTERVAL: 380 MS
EKG QRS DURATION: 76 MS
EKG QTC CALCULATION (BAZETT): 412 MS
EKG R AXIS: 74 DEGREES
EKG T AXIS: -17 DEGREES
EKG VENTRICULAR RATE: 71 BPM

## 2025-08-21 PROCEDURE — 99283 EMERGENCY DEPT VISIT LOW MDM: CPT

## 2025-08-21 PROCEDURE — 93005 ELECTROCARDIOGRAM TRACING: CPT | Performed by: EMERGENCY MEDICINE

## 2025-08-21 PROCEDURE — 93010 ELECTROCARDIOGRAM REPORT: CPT | Performed by: INTERNAL MEDICINE

## 2025-08-21 RX ORDER — FLUTICASONE FUROATE, UMECLIDINIUM BROMIDE AND VILANTEROL TRIFENATATE 200; 62.5; 25 UG/1; UG/1; UG/1
1 POWDER RESPIRATORY (INHALATION) DAILY
Qty: 28 EACH | Refills: 3 | Status: SHIPPED | OUTPATIENT
Start: 2025-08-21

## 2025-08-21 RX ORDER — BUDESONIDE, GLYCOPYRROLATE, AND FORMOTEROL FUMARATE 160; 9; 4.8 UG/1; UG/1; UG/1
2 AEROSOL, METERED RESPIRATORY (INHALATION) 2 TIMES DAILY
Qty: 5.9 G | Refills: 3 | Status: SHIPPED | OUTPATIENT
Start: 2025-08-21 | End: 2025-09-20

## 2025-08-21 ASSESSMENT — PAIN - FUNCTIONAL ASSESSMENT: PAIN_FUNCTIONAL_ASSESSMENT: 0-10

## 2025-08-21 ASSESSMENT — LIFESTYLE VARIABLES
HOW MANY STANDARD DRINKS CONTAINING ALCOHOL DO YOU HAVE ON A TYPICAL DAY: PATIENT DOES NOT DRINK
HOW OFTEN DO YOU HAVE A DRINK CONTAINING ALCOHOL: NEVER

## 2025-08-21 ASSESSMENT — PAIN SCALES - GENERAL: PAINLEVEL_OUTOF10: 0

## 2025-08-22 DIAGNOSIS — E53.8 VITAMIN B12 DEFICIENCY NEUROPATHY: ICD-10-CM

## 2025-08-22 DIAGNOSIS — G63 VITAMIN B12 DEFICIENCY NEUROPATHY: ICD-10-CM

## 2025-08-22 RX ORDER — CYANOCOBALAMIN 1000 UG/ML
INJECTION, SOLUTION INTRAMUSCULAR; SUBCUTANEOUS
Qty: 3 ML | Refills: 1 | OUTPATIENT
Start: 2025-08-22

## (undated) DEVICE — SOLUTION IV IRRIG WATER 500ML POUR BRL ST 2F7113

## (undated) DEVICE — AIR/WATER CLEANING ADAPTER FOR OLYMPUS® GI ENDOSCOPE: Brand: BULLDOG®

## (undated) DEVICE — MOUTHPIECE ENDOSCP L CTRL OPN AND SIDE PORTS DISP

## (undated) DEVICE — BW-412T DISP COMBO CLEANING BRUSH: Brand: SINGLE USE COMBINATION CLEANING BRUSH

## (undated) DEVICE — VALVE SUCTION AIR H2O SET ORCA POD + DISP

## (undated) DEVICE — FORCEPS BX L240CM WRK CHN 2.8MM STD CAP W/ NDL MIC MESH

## (undated) DEVICE — ENDOSCOPIC KIT 6X3/16 FT COLON W/ 1.1 OZ 2 GWN W/O BRSH